# Patient Record
Sex: FEMALE | Race: WHITE | NOT HISPANIC OR LATINO | Employment: OTHER | URBAN - METROPOLITAN AREA
[De-identification: names, ages, dates, MRNs, and addresses within clinical notes are randomized per-mention and may not be internally consistent; named-entity substitution may affect disease eponyms.]

---

## 2021-10-27 ENCOUNTER — APPOINTMENT (OUTPATIENT)
Dept: CT IMAGING | Facility: HOSPITAL | Age: 66
End: 2021-10-27

## 2021-10-27 ENCOUNTER — APPOINTMENT (OUTPATIENT)
Dept: GENERAL RADIOLOGY | Facility: HOSPITAL | Age: 66
End: 2021-10-27

## 2021-10-27 ENCOUNTER — HOSPITAL ENCOUNTER (EMERGENCY)
Facility: HOSPITAL | Age: 66
Discharge: HOME | End: 2021-10-27
Attending: EMERGENCY MEDICINE | Admitting: EMERGENCY MEDICINE

## 2021-10-27 VITALS
HEART RATE: 72 BPM | BODY MASS INDEX: 29.02 KG/M2 | WEIGHT: 170 LBS | SYSTOLIC BLOOD PRESSURE: 135 MMHG | DIASTOLIC BLOOD PRESSURE: 71 MMHG | RESPIRATION RATE: 16 BRPM | OXYGEN SATURATION: 88 % | HEIGHT: 64 IN | TEMPERATURE: 97.7 F

## 2021-10-27 DIAGNOSIS — J44.1 COPD EXACERBATION (HCC): Primary | ICD-10-CM

## 2021-10-27 LAB
ALBUMIN SERPL-MCNC: 4.3 G/DL (ref 3.5–5.2)
ALBUMIN/GLOB SERPL: 1.5 G/DL
ALP SERPL-CCNC: 100 U/L (ref 39–117)
ALT SERPL W P-5'-P-CCNC: 13 U/L (ref 1–33)
ANION GAP SERPL CALCULATED.3IONS-SCNC: 7 MMOL/L (ref 5–15)
AST SERPL-CCNC: 17 U/L (ref 1–32)
BASOPHILS # BLD AUTO: 0.05 10*3/MM3 (ref 0–0.2)
BASOPHILS NFR BLD AUTO: 0.7 % (ref 0–1.5)
BILIRUB SERPL-MCNC: 0.5 MG/DL (ref 0–1.2)
BUN SERPL-MCNC: 4 MG/DL (ref 8–23)
BUN/CREAT SERPL: 5.1 (ref 7–25)
CALCIUM SPEC-SCNC: 9.4 MG/DL (ref 8.6–10.5)
CHLORIDE SERPL-SCNC: 93 MMOL/L (ref 98–107)
CO2 SERPL-SCNC: 35 MMOL/L (ref 22–29)
CREAT SERPL-MCNC: 0.79 MG/DL (ref 0.57–1)
DEPRECATED RDW RBC AUTO: 53.2 FL (ref 37–54)
EOSINOPHIL # BLD AUTO: 0.07 10*3/MM3 (ref 0–0.4)
EOSINOPHIL NFR BLD AUTO: 0.9 % (ref 0.3–6.2)
ERYTHROCYTE [DISTWIDTH] IN BLOOD BY AUTOMATED COUNT: 14.2 % (ref 12.3–15.4)
GFR SERPL CREATININE-BSD FRML MDRD: 73 ML/MIN/1.73
GLOBULIN UR ELPH-MCNC: 2.8 GM/DL
GLUCOSE SERPL-MCNC: 94 MG/DL (ref 65–99)
HCT VFR BLD AUTO: 46.4 % (ref 34–46.6)
HGB BLD-MCNC: 15 G/DL (ref 12–15.9)
HOLD SPECIMEN: NORMAL
IMM GRANULOCYTES # BLD AUTO: 0.02 10*3/MM3 (ref 0–0.05)
IMM GRANULOCYTES NFR BLD AUTO: 0.3 % (ref 0–0.5)
LYMPHOCYTES # BLD AUTO: 2.42 10*3/MM3 (ref 0.7–3.1)
LYMPHOCYTES NFR BLD AUTO: 32.7 % (ref 19.6–45.3)
MCH RBC QN AUTO: 33.1 PG (ref 26.6–33)
MCHC RBC AUTO-ENTMCNC: 32.3 G/DL (ref 31.5–35.7)
MCV RBC AUTO: 102.4 FL (ref 79–97)
MONOCYTES # BLD AUTO: 0.5 10*3/MM3 (ref 0.1–0.9)
MONOCYTES NFR BLD AUTO: 6.8 % (ref 5–12)
NEUTROPHILS NFR BLD AUTO: 4.34 10*3/MM3 (ref 1.7–7)
NEUTROPHILS NFR BLD AUTO: 58.6 % (ref 42.7–76)
NRBC BLD AUTO-RTO: 0 /100 WBC (ref 0–0.2)
NT-PROBNP SERPL-MCNC: 102.4 PG/ML (ref 0–900)
PLATELET # BLD AUTO: 206 10*3/MM3 (ref 140–450)
PMV BLD AUTO: 10 FL (ref 6–12)
POTASSIUM SERPL-SCNC: 4.4 MMOL/L (ref 3.5–5.2)
PROT SERPL-MCNC: 7.1 G/DL (ref 6–8.5)
RBC # BLD AUTO: 4.53 10*6/MM3 (ref 3.77–5.28)
SODIUM SERPL-SCNC: 135 MMOL/L (ref 136–145)
TROPONIN T SERPL-MCNC: <0.01 NG/ML (ref 0–0.03)
WBC # BLD AUTO: 7.4 10*3/MM3 (ref 3.4–10.8)
WHOLE BLOOD HOLD SPECIMEN: NORMAL
WHOLE BLOOD HOLD SPECIMEN: NORMAL

## 2021-10-27 PROCEDURE — 99284 EMERGENCY DEPT VISIT MOD MDM: CPT

## 2021-10-27 PROCEDURE — 83880 ASSAY OF NATRIURETIC PEPTIDE: CPT | Performed by: EMERGENCY MEDICINE

## 2021-10-27 PROCEDURE — 71045 X-RAY EXAM CHEST 1 VIEW: CPT

## 2021-10-27 PROCEDURE — 93005 ELECTROCARDIOGRAM TRACING: CPT

## 2021-10-27 PROCEDURE — 80053 COMPREHEN METABOLIC PANEL: CPT | Performed by: EMERGENCY MEDICINE

## 2021-10-27 PROCEDURE — 71275 CT ANGIOGRAPHY CHEST: CPT

## 2021-10-27 PROCEDURE — 94799 UNLISTED PULMONARY SVC/PX: CPT

## 2021-10-27 PROCEDURE — 94640 AIRWAY INHALATION TREATMENT: CPT

## 2021-10-27 PROCEDURE — 0 IOPAMIDOL PER 1 ML: Performed by: EMERGENCY MEDICINE

## 2021-10-27 PROCEDURE — 84484 ASSAY OF TROPONIN QUANT: CPT | Performed by: EMERGENCY MEDICINE

## 2021-10-27 PROCEDURE — 85025 COMPLETE CBC W/AUTO DIFF WBC: CPT | Performed by: EMERGENCY MEDICINE

## 2021-10-27 PROCEDURE — 93005 ELECTROCARDIOGRAM TRACING: CPT | Performed by: EMERGENCY MEDICINE

## 2021-10-27 RX ORDER — TRAMADOL HYDROCHLORIDE 50 MG/1
50 TABLET ORAL EVERY 6 HOURS PRN
COMMUNITY

## 2021-10-27 RX ORDER — PREDNISONE 10 MG/1
TABLET ORAL
Qty: 21 TABLET | Refills: 0 | Status: SHIPPED | OUTPATIENT
Start: 2021-10-27

## 2021-10-27 RX ORDER — GABAPENTIN 600 MG/1
600 TABLET ORAL 3 TIMES DAILY
COMMUNITY

## 2021-10-27 RX ORDER — SODIUM CHLORIDE 0.9 % (FLUSH) 0.9 %
10 SYRINGE (ML) INJECTION AS NEEDED
Status: DISCONTINUED | OUTPATIENT
Start: 2021-10-27 | End: 2021-10-27 | Stop reason: HOSPADM

## 2021-10-27 RX ORDER — METHYLPREDNISOLONE SODIUM SUCCINATE 125 MG/2ML
125 INJECTION, POWDER, LYOPHILIZED, FOR SOLUTION INTRAMUSCULAR; INTRAVENOUS ONCE
Status: DISCONTINUED | OUTPATIENT
Start: 2021-10-27 | End: 2021-10-27 | Stop reason: HOSPADM

## 2021-10-27 RX ORDER — ALBUTEROL SULFATE 90 UG/1
2 AEROSOL, METERED RESPIRATORY (INHALATION) ONCE
Status: COMPLETED | OUTPATIENT
Start: 2021-10-27 | End: 2021-10-27

## 2021-10-27 RX ADMIN — IOPAMIDOL 80 ML: 755 INJECTION, SOLUTION INTRAVENOUS at 12:20

## 2021-10-27 RX ADMIN — ALBUTEROL SULFATE 2 PUFF: 90 AEROSOL, METERED RESPIRATORY (INHALATION) at 13:48

## 2021-10-28 LAB
QT INTERVAL: 428 MS
QTC INTERVAL: 441 MS

## 2024-08-30 ENCOUNTER — APPOINTMENT (OUTPATIENT)
Dept: GENERAL RADIOLOGY | Facility: HOSPITAL | Age: 69
End: 2024-08-30
Payer: MEDICARE

## 2024-08-30 ENCOUNTER — HOSPITAL ENCOUNTER (INPATIENT)
Facility: HOSPITAL | Age: 69
LOS: 3 days | Discharge: HOME | End: 2024-09-03
Attending: EMERGENCY MEDICINE | Admitting: STUDENT IN AN ORGANIZED HEALTH CARE EDUCATION/TRAINING PROGRAM
Payer: MEDICARE

## 2024-08-30 ENCOUNTER — APPOINTMENT (OUTPATIENT)
Dept: CT IMAGING | Facility: HOSPITAL | Age: 69
End: 2024-08-30
Payer: MEDICARE

## 2024-08-30 DIAGNOSIS — J96.01 ACUTE RESPIRATORY FAILURE WITH HYPOXIA: ICD-10-CM

## 2024-08-30 DIAGNOSIS — J44.1 COPD EXACERBATION: Primary | ICD-10-CM

## 2024-08-30 PROBLEM — J96.11 CHRONIC HYPOXEMIC RESPIRATORY FAILURE: Status: ACTIVE | Noted: 2022-02-21

## 2024-08-30 PROBLEM — I27.81 COR PULMONALE: Status: ACTIVE | Noted: 2022-02-21

## 2024-08-30 LAB
ALBUMIN SERPL-MCNC: 3.9 G/DL (ref 3.5–5.2)
ALBUMIN/GLOB SERPL: 1.7 G/DL
ALP SERPL-CCNC: 83 U/L (ref 39–117)
ALT SERPL W P-5'-P-CCNC: 14 U/L (ref 1–33)
ANION GAP SERPL CALCULATED.3IONS-SCNC: 6 MMOL/L (ref 5–15)
AST SERPL-CCNC: 16 U/L (ref 1–32)
B PARAPERT DNA SPEC QL NAA+PROBE: NOT DETECTED
B PERT DNA SPEC QL NAA+PROBE: NOT DETECTED
BASOPHILS # BLD AUTO: 0.06 10*3/MM3 (ref 0–0.2)
BASOPHILS NFR BLD AUTO: 0.7 % (ref 0–1.5)
BILIRUB SERPL-MCNC: 0.4 MG/DL (ref 0–1.2)
BUN SERPL-MCNC: 11 MG/DL (ref 8–23)
BUN/CREAT SERPL: 9.8 (ref 7–25)
C PNEUM DNA NPH QL NAA+NON-PROBE: NOT DETECTED
CALCIUM SPEC-SCNC: 8.9 MG/DL (ref 8.6–10.5)
CHLORIDE SERPL-SCNC: 93 MMOL/L (ref 98–107)
CO2 SERPL-SCNC: 36 MMOL/L (ref 22–29)
CREAT SERPL-MCNC: 1.12 MG/DL (ref 0.57–1)
DEPRECATED RDW RBC AUTO: 57.1 FL (ref 37–54)
EGFRCR SERPLBLD CKD-EPI 2021: 53.3 ML/MIN/1.73
EOSINOPHIL # BLD AUTO: 0.06 10*3/MM3 (ref 0–0.4)
EOSINOPHIL NFR BLD AUTO: 0.7 % (ref 0.3–6.2)
ERYTHROCYTE [DISTWIDTH] IN BLOOD BY AUTOMATED COUNT: 14.7 % (ref 12.3–15.4)
FLUAV SUBTYP SPEC NAA+PROBE: NOT DETECTED
FLUBV RNA ISLT QL NAA+PROBE: NOT DETECTED
GLOBULIN UR ELPH-MCNC: 2.3 GM/DL
GLUCOSE SERPL-MCNC: 109 MG/DL (ref 65–99)
HADV DNA SPEC NAA+PROBE: NOT DETECTED
HCOV 229E RNA SPEC QL NAA+PROBE: NOT DETECTED
HCOV HKU1 RNA SPEC QL NAA+PROBE: NOT DETECTED
HCOV NL63 RNA SPEC QL NAA+PROBE: NOT DETECTED
HCOV OC43 RNA SPEC QL NAA+PROBE: NOT DETECTED
HCT VFR BLD AUTO: 49.6 % (ref 34–46.6)
HGB BLD-MCNC: 14.9 G/DL (ref 12–15.9)
HMPV RNA NPH QL NAA+NON-PROBE: NOT DETECTED
HOLD SPECIMEN: NORMAL
HPIV1 RNA ISLT QL NAA+PROBE: NOT DETECTED
HPIV2 RNA SPEC QL NAA+PROBE: NOT DETECTED
HPIV3 RNA NPH QL NAA+PROBE: NOT DETECTED
HPIV4 P GENE NPH QL NAA+PROBE: NOT DETECTED
IMM GRANULOCYTES # BLD AUTO: 0.02 10*3/MM3 (ref 0–0.05)
IMM GRANULOCYTES NFR BLD AUTO: 0.2 % (ref 0–0.5)
LYMPHOCYTES # BLD AUTO: 1.4 10*3/MM3 (ref 0.7–3.1)
LYMPHOCYTES NFR BLD AUTO: 16.8 % (ref 19.6–45.3)
M PNEUMO IGG SER IA-ACNC: NOT DETECTED
MCH RBC QN AUTO: 31.2 PG (ref 26.6–33)
MCHC RBC AUTO-ENTMCNC: 30 G/DL (ref 31.5–35.7)
MCV RBC AUTO: 104 FL (ref 79–97)
MONOCYTES # BLD AUTO: 0.59 10*3/MM3 (ref 0.1–0.9)
MONOCYTES NFR BLD AUTO: 7.1 % (ref 5–12)
NEUTROPHILS NFR BLD AUTO: 6.21 10*3/MM3 (ref 1.7–7)
NEUTROPHILS NFR BLD AUTO: 74.5 % (ref 42.7–76)
NRBC BLD AUTO-RTO: 0 /100 WBC (ref 0–0.2)
NT-PROBNP SERPL-MCNC: 6982 PG/ML (ref 0–900)
PLATELET # BLD AUTO: 181 10*3/MM3 (ref 140–450)
PMV BLD AUTO: 10.8 FL (ref 6–12)
POTASSIUM SERPL-SCNC: 4.5 MMOL/L (ref 3.5–5.2)
PROT SERPL-MCNC: 6.2 G/DL (ref 6–8.5)
RBC # BLD AUTO: 4.77 10*6/MM3 (ref 3.77–5.28)
RHINOVIRUS RNA SPEC NAA+PROBE: NOT DETECTED
RSV RNA NPH QL NAA+NON-PROBE: NOT DETECTED
SARS-COV-2 RNA NPH QL NAA+NON-PROBE: NOT DETECTED
SODIUM SERPL-SCNC: 135 MMOL/L (ref 136–145)
TROPONIN T SERPL HS-MCNC: 21 NG/L
WBC NRBC COR # BLD AUTO: 8.34 10*3/MM3 (ref 3.4–10.8)
WHOLE BLOOD HOLD COAG: NORMAL
WHOLE BLOOD HOLD SPECIMEN: NORMAL

## 2024-08-30 PROCEDURE — 94799 UNLISTED PULMONARY SVC/PX: CPT

## 2024-08-30 PROCEDURE — 71045 X-RAY EXAM CHEST 1 VIEW: CPT

## 2024-08-30 PROCEDURE — 25510000001 IOPAMIDOL PER 1 ML: Performed by: EMERGENCY MEDICINE

## 2024-08-30 PROCEDURE — G0378 HOSPITAL OBSERVATION PER HR: HCPCS

## 2024-08-30 PROCEDURE — 99285 EMERGENCY DEPT VISIT HI MDM: CPT

## 2024-08-30 PROCEDURE — 83880 ASSAY OF NATRIURETIC PEPTIDE: CPT | Performed by: EMERGENCY MEDICINE

## 2024-08-30 PROCEDURE — 25010000002 METHYLPREDNISOLONE PER 125 MG: Performed by: INTERNAL MEDICINE

## 2024-08-30 PROCEDURE — 80053 COMPREHEN METABOLIC PANEL: CPT | Performed by: EMERGENCY MEDICINE

## 2024-08-30 PROCEDURE — 84484 ASSAY OF TROPONIN QUANT: CPT | Performed by: EMERGENCY MEDICINE

## 2024-08-30 PROCEDURE — 25010000002 METHYLPREDNISOLONE PER 125 MG: Performed by: EMERGENCY MEDICINE

## 2024-08-30 PROCEDURE — 0202U NFCT DS 22 TRGT SARS-COV-2: CPT | Performed by: EMERGENCY MEDICINE

## 2024-08-30 PROCEDURE — 94640 AIRWAY INHALATION TREATMENT: CPT

## 2024-08-30 PROCEDURE — 99222 1ST HOSP IP/OBS MODERATE 55: CPT | Performed by: INTERNAL MEDICINE

## 2024-08-30 PROCEDURE — 93005 ELECTROCARDIOGRAM TRACING: CPT | Performed by: EMERGENCY MEDICINE

## 2024-08-30 PROCEDURE — 71275 CT ANGIOGRAPHY CHEST: CPT

## 2024-08-30 PROCEDURE — 85025 COMPLETE CBC W/AUTO DIFF WBC: CPT | Performed by: EMERGENCY MEDICINE

## 2024-08-30 RX ORDER — DOXYCYCLINE 100 MG/1
100 CAPSULE ORAL EVERY 12 HOURS SCHEDULED
Status: DISCONTINUED | OUTPATIENT
Start: 2024-08-30 | End: 2024-09-03 | Stop reason: HOSPADM

## 2024-08-30 RX ORDER — METHYLPREDNISOLONE SODIUM SUCCINATE 125 MG/2ML
60 INJECTION, POWDER, LYOPHILIZED, FOR SOLUTION INTRAMUSCULAR; INTRAVENOUS EVERY 12 HOURS
Status: DISCONTINUED | OUTPATIENT
Start: 2024-08-30 | End: 2024-09-01

## 2024-08-30 RX ORDER — ALPRAZOLAM 0.25 MG
0.25 TABLET ORAL 2 TIMES DAILY PRN
Status: DISCONTINUED | OUTPATIENT
Start: 2024-08-30 | End: 2024-09-03 | Stop reason: HOSPADM

## 2024-08-30 RX ORDER — GABAPENTIN 400 MG/1
800 CAPSULE ORAL EVERY 8 HOURS SCHEDULED
Status: DISCONTINUED | OUTPATIENT
Start: 2024-08-30 | End: 2024-09-03 | Stop reason: HOSPADM

## 2024-08-30 RX ORDER — HYDROCHLOROTHIAZIDE 12.5 MG/1
12.5 TABLET ORAL DAILY
Status: ON HOLD | COMMUNITY
End: 2024-08-31

## 2024-08-30 RX ORDER — METHYLPREDNISOLONE SODIUM SUCCINATE 125 MG/2ML
125 INJECTION, POWDER, LYOPHILIZED, FOR SOLUTION INTRAMUSCULAR; INTRAVENOUS ONCE
Status: COMPLETED | OUTPATIENT
Start: 2024-08-30 | End: 2024-08-30

## 2024-08-30 RX ORDER — SODIUM CHLORIDE 0.9 % (FLUSH) 0.9 %
10 SYRINGE (ML) INJECTION AS NEEDED
Status: DISCONTINUED | OUTPATIENT
Start: 2024-08-30 | End: 2024-09-03 | Stop reason: HOSPADM

## 2024-08-30 RX ORDER — FAMOTIDINE 20 MG/1
20 TABLET, FILM COATED ORAL
Status: DISCONTINUED | OUTPATIENT
Start: 2024-08-30 | End: 2024-09-03 | Stop reason: HOSPADM

## 2024-08-30 RX ORDER — IPRATROPIUM BROMIDE AND ALBUTEROL SULFATE 2.5; .5 MG/3ML; MG/3ML
3 SOLUTION RESPIRATORY (INHALATION)
Status: DISCONTINUED | OUTPATIENT
Start: 2024-08-30 | End: 2024-09-02

## 2024-08-30 RX ORDER — IOPAMIDOL 755 MG/ML
100 INJECTION, SOLUTION INTRAVASCULAR
Status: COMPLETED | OUTPATIENT
Start: 2024-08-30 | End: 2024-08-30

## 2024-08-30 RX ORDER — TRAZODONE HYDROCHLORIDE 100 MG/1
100 TABLET ORAL NIGHTLY
COMMUNITY

## 2024-08-30 RX ORDER — ALBUTEROL SULFATE 90 UG/1
2 AEROSOL, METERED RESPIRATORY (INHALATION)
COMMUNITY

## 2024-08-30 RX ORDER — BISOPROLOL FUMARATE AND HYDROCHLOROTHIAZIDE 10; 6.25 MG/1; MG/1
1 TABLET ORAL DAILY
COMMUNITY

## 2024-08-30 RX ORDER — TRAZODONE HYDROCHLORIDE 100 MG/1
100 TABLET ORAL NIGHTLY
Status: DISCONTINUED | OUTPATIENT
Start: 2024-08-30 | End: 2024-09-03 | Stop reason: HOSPADM

## 2024-08-30 RX ORDER — BISOPROLOL FUMARATE 10 MG/1
10 TABLET, FILM COATED ORAL
Status: DISCONTINUED | OUTPATIENT
Start: 2024-08-31 | End: 2024-08-31

## 2024-08-30 RX ORDER — NICOTINE 21 MG/24HR
1 PATCH, TRANSDERMAL 24 HOURS TRANSDERMAL
Status: DISCONTINUED | OUTPATIENT
Start: 2024-08-30 | End: 2024-09-03 | Stop reason: HOSPADM

## 2024-08-30 RX ORDER — UMECLIDINIUM BROMIDE AND VILANTEROL TRIFENATATE 62.5; 25 UG/1; UG/1
1 POWDER RESPIRATORY (INHALATION)
COMMUNITY

## 2024-08-30 RX ORDER — ALBUTEROL SULFATE 0.83 MG/ML
2.5 SOLUTION RESPIRATORY (INHALATION) ONCE
Status: COMPLETED | OUTPATIENT
Start: 2024-08-30 | End: 2024-08-30

## 2024-08-30 RX ORDER — ALBUTEROL SULFATE 0.83 MG/ML
2.5 SOLUTION RESPIRATORY (INHALATION) EVERY 6 HOURS PRN
Status: DISCONTINUED | OUTPATIENT
Start: 2024-08-30 | End: 2024-09-02

## 2024-08-30 RX ADMIN — IPRATROPIUM BROMIDE AND ALBUTEROL SULFATE 3 ML: 2.5; .5 SOLUTION RESPIRATORY (INHALATION) at 19:38

## 2024-08-30 RX ADMIN — ALBUTEROL SULFATE 2.5 MG: 2.5 SOLUTION RESPIRATORY (INHALATION) at 10:49

## 2024-08-30 RX ADMIN — TRAZODONE HYDROCHLORIDE 100 MG: 100 TABLET ORAL at 22:49

## 2024-08-30 RX ADMIN — METHYLPREDNISOLONE SODIUM SUCCINATE 60 MG: 125 INJECTION, POWDER, FOR SOLUTION INTRAMUSCULAR; INTRAVENOUS at 22:49

## 2024-08-30 RX ADMIN — IPRATROPIUM BROMIDE AND ALBUTEROL SULFATE 3 ML: 2.5; .5 SOLUTION RESPIRATORY (INHALATION) at 15:13

## 2024-08-30 RX ADMIN — IOPAMIDOL 80 ML: 755 INJECTION, SOLUTION INTRAVENOUS at 11:36

## 2024-08-30 RX ADMIN — NICOTINE 1 PATCH: 21 PATCH TRANSDERMAL at 14:24

## 2024-08-30 RX ADMIN — DOXYCYCLINE 100 MG: 100 CAPSULE ORAL at 14:25

## 2024-08-30 RX ADMIN — METHYLPREDNISOLONE SODIUM SUCCINATE 125 MG: 125 INJECTION, POWDER, FOR SOLUTION INTRAMUSCULAR; INTRAVENOUS at 10:55

## 2024-08-30 RX ADMIN — FAMOTIDINE 20 MG: 20 TABLET, FILM COATED ORAL at 17:03

## 2024-08-30 RX ADMIN — DOXYCYCLINE 100 MG: 100 CAPSULE ORAL at 22:48

## 2024-08-30 RX ADMIN — GABAPENTIN 800 MG: 400 CAPSULE ORAL at 22:48

## 2024-08-30 NOTE — ED NOTES
Elvi Keita    Nursing Report ED to Floor:  Mental status: alert and oriented  Ambulatory status: ambulates with assistance, however on oxygen and SOA  Oxygen Therapy:  4LNC  Cardiac Rhythm: NSR  Admitted from: ED rm 14  Safety Concerns:  SOA with exertion, hx of copd  Social Issues: applied nicotine patch prior to admission  ED Room #:  14    ED Nurse Phone Extension - [phone] or may call [phone].      HPI:   Chief Complaint   Patient presents with    Shortness of Breath       Past Medical History:  Past Medical History:   Diagnosis Date    COPD (chronic obstructive pulmonary disease)     Hypertension         Past Surgical History:  Past Surgical History:   Procedure Laterality Date    LAPAROSCOPIC TUBAL LIGATION      TOE SURGERY          Admitting Doctor:   Reyna Sanchez MD    Consulting Provider(s):  Consults       No orders found from 8/1/2024 to 8/31/2024.             Admitting Diagnosis:   The primary encounter diagnosis was COPD exacerbation. A diagnosis of Acute respiratory failure with hypoxia was also pertinent to this visit.    Most Recent Vitals:   Vitals:    08/30/24 1049 08/30/24 1330 08/30/24 1412 08/30/24 1427   BP:  151/63     Pulse: 62 69 77    Resp: 22   20   Temp:    98 °F (36.7 °C)   TempSrc:    Oral   SpO2: 93% 90% 91%    Weight:       Height:           Active LDAs/IV Access:   Lines, Drains & Airways       Active LDAs       Name Placement date Placement time Site Days    Peripheral IV 08/30/24 0943 Right Antecubital 08/30/24  0943  Antecubital  less than 1                    Labs (abnormal labs have a star):   Labs Reviewed   COMPREHENSIVE METABOLIC PANEL - Abnormal; Notable for the following components:       Result Value    Glucose 109 (*)     Creatinine 1.12 (*)     Sodium 135 (*)     Chloride 93 (*)     CO2 36.0 (*)     eGFR 53.3 (*)     All other components within normal limits    Narrative:     GFR Normal >60  Chronic Kidney Disease <60  Kidney Failure <15     BNP (IN-HOUSE) - Abnormal;  Notable for the following components:    proBNP 6,982.0 (*)     All other components within normal limits    Narrative:     This assay is used as an aid in the diagnosis of individuals suspected of having heart failure. It can be used as an aid in the diagnosis of acute decompensated heart failure (ADHF) in patients presenting with signs and symptoms of ADHF to the emergency department (ED). In addition, NT-proBNP of <300 pg/mL indicates ADHF is not likely.    Age Range Result Interpretation  NT-proBNP Concentration (pg/mL:      <50             Positive            >450                   Gray                 300-450                    Negative             <300    50-75           Positive            >900                  Gray                300-900                  Negative            <300      >75             Positive            >1800                  Gray                300-1800                  Negative            <300   SINGLE HS TROPONIN T - Abnormal; Notable for the following components:    HS Troponin T 21 (*)     All other components within normal limits    Narrative:     High Sensitive Troponin T Reference Range:  <14.0 ng/L- Negative Female for AMI  <22.0 ng/L- Negative Male for AMI  >=14 - Abnormal Female indicating possible myocardial injury.  >=22 - Abnormal Male indicating possible myocardial injury.   Clinicians would have to utilize clinical acumen, EKG, Troponin, and serial changes to determine if it is an Acute Myocardial Infarction or myocardial injury due to an underlying chronic condition.        CBC WITH AUTO DIFFERENTIAL - Abnormal; Notable for the following components:    Hematocrit 49.6 (*)     .0 (*)     MCHC 30.0 (*)     RDW-SD 57.1 (*)     Lymphocyte % 16.8 (*)     All other components within normal limits   RESPIRATORY PANEL PCR W/ COVID-19 (SARS-COV-2), NP SWAB IN UTM/VTP, 2 HR TAT - Normal    Narrative:     In the setting of a positive respiratory panel with a viral infection PLUS  a negative procalcitonin without other underlying concern for bacterial infection, consider observing off antibiotics or discontinuation of antibiotics and continue supportive care. If the respiratory panel is positive for atypical bacterial infection (Bordetella pertussis, Chlamydophila pneumoniae, or Mycoplasma pneumoniae), consider antibiotic de-escalation to target atypical bacterial infection.   RAINBOW DRAW    Narrative:     The following orders were created for panel order [place] Draw.  Procedure                               Abnormality         Status                     ---------                               -----------         ------                     Green Top (Gel)[[id number]]                                  Final result               Lavender Top[[id number]]                                     Final result               Gold Top - SST[[id number]]                                   Final result               Torres Top[[id number]]                                         Final result               Light Blue Top[[id number]]                                   Final result                 Please view results for these tests on the individual orders.   CBC AND DIFFERENTIAL    Narrative:     The following orders were created for panel order CBC & Differential.  Procedure                               Abnormality         Status                     ---------                               -----------         ------                     CBC Auto Differential[[id number]]        Abnormal            Final result                 Please view results for these tests on the individual orders.   GREEN TOP   LAVENDER TOP   GOLD TOP - SST   GRAY TOP   LIGHT BLUE TOP       Meds Given in ED:   Medications   sodium chloride 0.9 % flush 10 mL (has no administration in time range)   nicotine (NICODERM CQ) 21 MG/24HR patch 1 patch (1 patch Transdermal Medication Applied 8/30/24 0463)   ipratropium-albuterol (DUO-NEB) nebulizer  solution 3 mL (has no administration in time range)   albuterol (PROVENTIL) nebulizer solution 0.083% 2.5 mg/3mL (has no administration in time range)   doxycycline (MONODOX) capsule 100 mg (100 mg Oral Given 8/30/24 1425)   ALPRAZolam (XANAX) tablet 0.25 mg (has no administration in time range)   methylPREDNISolone sodium succinate (SOLU-Medrol) injection 125 mg (125 mg Intravenous Given 8/30/24 1055)   albuterol (PROVENTIL) nebulizer solution 0.083% 2.5 mg/3mL (2.5 mg Nebulization Given 8/30/24 1049)   iopamidol (ISOVUE-370) 76 % injection 100 mL (80 mL Intravenous Given 8/30/24 1136)           Last NIH score:                                                          Dysphagia screening results:        [place] Coma Scale:  No data recorded     CIWA:        Restraint Type:            Isolation Status:  No active isolations

## 2024-08-30 NOTE — CASE MANAGEMENT/SOCIAL WORK
Discharge Planning Assessment  [facility]     Patient Name: Elvi Corbett  MRN: [id number]  Today's Date: 8/30/2024    Admit Date: 8/30/2024    Plan: Initial   Discharge Needs Assessment       Row Name 08/30/24 1132       Living Environment    People in Home spouse    Name(s) of People in Home JESENIA CORBETT Spouse [phone]    Current Living Arrangements home    Potentially Unsafe Housing Conditions none    In the past 12 months has the electric, gas, oil, or water company threatened to shut off services in your home? No    Primary Care Provided by self    Provides Primary Care For no one    Family Caregiver if Needed child(fani), adult    Family Caregiver Names AARON WYLIE Daughter   [phone]    Quality of Family Relationships helpful    Able to Return to Prior Arrangements yes       Resource/Environmental Concerns    Resource/Environmental Concerns none    Transportation Concerns none       Transportation Needs    In the past 12 months, has lack of transportation kept you from medical appointments or from getting medications? no    In the past 12 months, has lack of transportation kept you from meetings, work, or from getting things needed for daily living? No       Food Insecurity    Within the past 12 months, you worried that your food would run out before you got the money to buy more. Never true    Within the past 12 months, the food you bought just didn't last and you didn't have money to get more. Never true       Transition Planning    Patient/Family Anticipates Transition to home with family    Patient/Family Anticipated Services at Transition     Transportation Anticipated family or friend will provide       Discharge Needs Assessment    Readmission Within the Last 30 Days no previous admission in last 30 days    Equipment Currently Used at Home oxygen;nebulizer    Concerns to be Addressed denies needs/concerns at this time    Anticipated Changes Related to Illness none    Equipment  Needed After Discharge none                   Discharge Plan       Row Name 08/30/24 1133       Plan    Plan Initial    Plan Comments CM spoke with patient at bedside regarding DC planning. Patient resides in [facility] with her spouse. Patient is independent with ADL's, uses no DME - on home O2 qhs provided by [employer]. Has a nebulizer machine. Patient denies any current home health or outpatient services. Patient has medical insurance, prescription coverage and is able to afford/obtain medications without difficulty. Patient has no advanced directives. Patient denies any discharge planning needs. Goal is home. CM will continue to follow    Final Discharge Disposition Code 30 - still a patient                  Continued Care and Services - Admitted Since 8/30/2024    No active coordination exists for this encounter.          Demographic Summary       Row Name 08/30/24 1130       General Information    Arrived From home    Referral Source emergency department    Reason for Consult discharge planning    Preferred Language English       Contact Information    Contact Information Comments JESENIA CORBETT Spouse [phone]                   Functional Status       Row Name 08/30/24 1131       Functional Status    Usual Activity Tolerance good    Current Activity Tolerance good       Physical Activity    On average, how many days per week do you engage in moderate to strenuous exercise (like a brisk walk)? 0 days    On average, how many minutes do you engage in exercise at this level? 0 min    Number of minutes of exercise per week 0       Assessment of Health Literacy    How often do you have someone help you read hospital materials? Never    How often do you have problems learning about your medical condition because of difficulty understanding written information? Never    How often do you have a problem understanding what is told to you about your medical condition? Never    How confident are you filling out  medical forms by yourself? Quite a bit    Health Literacy Moderate       Functional Status, IADL    Medications independent    Meal Preparation independent    Housekeeping independent    Laundry independent    Shopping independent       Mental Status    General Appearance WDL WDL       Mental Status Summary    Recent Changes in Mental Status/Cognitive Functioning no changes       Employment/    Employment Status retired                   Psychosocial    No documentation.                  Abuse/Neglect    No documentation.                  Legal    No documentation.                  Substance Abuse    No documentation.                  Patient Forms    No documentation.                     Zenaida Alcaraz RN

## 2024-08-30 NOTE — PLAN OF CARE
Problem: Adult Inpatient Plan of Care  Goal: Plan of Care Review  Outcome: Ongoing, Progressing  Goal: Patient-Specific Goal (Individualized)  Outcome: Ongoing, Progressing  Goal: Absence of Hospital-Acquired Illness or Injury  Outcome: Ongoing, Progressing  Goal: Optimal Comfort and Wellbeing  Outcome: Ongoing, Progressing  Goal: Readiness for Transition of Care  Outcome: Ongoing, Progressing  Intervention: Mutually Develop Transition Plan  Recent Flowsheet Documentation  Taken 8/30/2024 1520 by Zakia Obando, RN  Equipment Currently Used at Home:   bath bench   scales   oxygen   walker, rolling     Problem: COPD (Chronic Obstructive Pulmonary Disease) Comorbidity  Goal: Maintenance of COPD Symptom Control  Outcome: Ongoing, Progressing   Goal Outcome Evaluation:

## 2024-08-30 NOTE — ED PROVIDER NOTES
Subjective   History of Present Illness  Mrs. Kim presents with 1 week of shortness of breath.  She reports sore throat and cough.  She denies fevers or chills.  Daughter tells me that she has been sleeping mostly over the last couple of days.  She has had at least 1 fall.  She has history of COPD.  She is prescribed oxygen to use only at night.  She tells me she does not feel like her machine is putting out much oxygen.  She is has a nebulizer but denies chronic steroid use.  Saturations were 71% on room air in triage.      Review of Systems    Past Medical History:   Diagnosis Date    COPD (chronic obstructive pulmonary disease)     Hypertension        Allergies   Allergen Reactions    Fish Oil Rash       No past surgical history on file.    No family history on file.    Social History     Socioeconomic History    Marital status:            Objective   Physical Exam  Vitals (Saturations 93% on 4 L oxygen while she is resting in bed) and nursing note reviewed.   Constitutional:       General: She is not in acute distress.     Appearance: Normal appearance.   HENT:      Head: Normocephalic and atraumatic.      Nose: Nose normal. No congestion or rhinorrhea.   Eyes:      General: No scleral icterus.     Conjunctiva/sclera: Conjunctivae normal.   Neck:      Comments: No JVD   Cardiovascular:      Rate and Rhythm: Normal rate and regular rhythm.      Heart sounds: No murmur heard.     No friction rub.   Pulmonary:      Effort: Pulmonary effort is normal.      Breath sounds: Decreased breath sounds and wheezing present. No rales.   Abdominal:      General: Bowel sounds are normal.      Palpations: Abdomen is soft.      Tenderness: There is no abdominal tenderness. There is no guarding or rebound.   Musculoskeletal:         General: No tenderness.      Cervical back: Normal range of motion and neck supple.      Right lower leg: No edema.      Left lower leg: No edema.   Skin:     General: Skin is warm and dry.       Coloration: Skin is not pale.      Findings: No erythema.   Neurological:      General: No focal deficit present.      Mental Status: She is alert and oriented to person, place, and time.      Motor: No weakness.      Coordination: Coordination normal.   Psychiatric:         Mood and Affect: Mood normal.         Behavior: Behavior normal.         Thought Content: Thought content normal.         Procedures           ED Course  ED Course as of 08/30/24 1309   Fri Aug 30, 2024   1059 Chest x-ray does not show any gross problem.  Radiology has read as normal.  Will obtain CT scan of her chest.  BNP elevated.  She tells me she does take a fluid pill and has had echocardiogram several years ago but cannot remember where it was done.  There is no record of that in our electronic medical record.  Respiratory panel is pending. [DT]   1251 Respiratory panel negative.  CT scan negative for PE or pneumonia.  Shows evidence of COPD. [DT]      ED Course User Index  [DT] Darinel Wolfe MD                                             Medical Decision Making  Please see course notes.  I ordered and interpreted labs as well as chest x-ray, EKG, CT scanning of her chest.  Had multiple reevaluations.  Ordered multiple medications    Problems Addressed:  Acute respiratory failure with hypoxia: complicated acute illness or injury that poses a threat to life or bodily functions  COPD exacerbation: complicated acute illness or injury that poses a threat to life or bodily functions    Amount and/or Complexity of Data Reviewed  Labs: ordered. Decision-making details documented in ED Course.  Radiology: ordered. Decision-making details documented in ED Course.  ECG/medicine tests: ordered. Decision-making details documented in ED Course.    Risk  OTC drugs.  Prescription drug management.  Decision regarding hospitalization.        Final diagnoses:   COPD exacerbation   Acute respiratory failure with hypoxia       ED Disposition  ED  Disposition       ED Disposition   Decision to Admit    Condition   --    Comment   Level of Care: Telemetry [5]   Diagnosis: COPD with exacerbation [433255]   Admitting Physician: CALVIN JONES [[id number]]                 No follow-up provider specified.       Medication List      No changes were made to your prescriptions during this visit.            Darinel Wolfe MD  08/30/24 9449

## 2024-08-30 NOTE — H&P
[facility] Medicine Services  HISTORY AND PHYSICAL    Patient Name: Elvi Keita  : 1955  MRN: [id number]  Primary Care Physician: Maria Alejandra West APRN    Subjective   Subjective     Chief Complaint:shortness of breath    HPI:  Elvi Keita is a 69 y.o. female who  has a past medical history of COPD (chronic obstructive pulmonary disease) and Hypertension. who presented to the ED after two days of lying in bed and today increasing shortness of breath and distress. Her daughter brought her to the ED with OX sats of 71%. She has had a productive cough, no fever or chills, denies chest pain         Review of Systems   Patient denies weight loss, headaches, changes in vision, fever, chills, sore throat, nausea or vomiting, diarrhea, abdominal pain or distension, change in urine output or habits, joint pain, rash, itching, numbness/tingling, weakness or bleeding.    Otherwise complete ROS is negative except as mentioned in the HPI.    Personal History         PMH: She  has a past medical history of COPD (chronic obstructive pulmonary disease) and Hypertension.   PSxH: She  has a past surgical history that includes Toe Surgery and Laparoscopic tubal ligation.         FH: Her family history includes COPD in her mother; No Known Problems in her father.   SH: She  reports that she has been smoking cigarettes. She started smoking about 53 years ago. She has a 53.7 pack-year smoking history. She does not have any smokeless tobacco history on file. She reports that she does not use drugs.     Medications:  Umeclidinium-Vilanterol, albuterol sulfate HFA, bisoprolol-hydrochlorothiazide, gabapentin, hydroCHLOROthiazide, predniSONE, sertraline, traMADol, and traZODone    Allergies   Allergen Reactions    Fish Oil Rash       Objective   Objective     Vital Signs:   Temp:  [97.8 °F (36.6 °C)-99.2 °F (37.3 °C)] 98.5 °F (36.9 °C)  Heart Rate:  [62-77] 69  Resp:  [20-26] 20  BP: (113-160)/(50-69)  160/69  Flow (L/min):  [4-6] 4    Constitutional: Awake, alert, interactive and anxious  Eyes: clear sclerae, no conjunctival injection  HENT: NCAT, mucous membranes moist  Neck: no masses or lymphadenopathy, trachea midline  Respiratory: coarse breath sounds with productive cough  Cardiovascular: RRR, no murmurs appreciated, palpable peripheral pulses  Abdomen:  soft, no HSM or masses palpable, not tender or distended  Musculoskeletal: No peripheral edema, clubbing or cyanosis  Neurologic: Oriented x 3,                       Strength symmetric in all extremities                     Cranial Nerves grossly intact, speech clear  Skin: No rashes or jaundice  Psychiatric: Appropriate mood, insight, anxious      Result Review:  I have personally reviewed the results from the time of this admission   to 8/30/2024 16:16 EDT and agree with these findings:  [x]  Laboratory  [x]  Microbiology  [x]  Radiology  []  EKG/Telemetry   []  Cardiology/Vascular   []  Pathology  [x]  Old records  []  Other:  Most notable findings include: normal labs, CT and CXR reviewed,       LAB RESULTS:      Lab 08/30/24  0944   WBC 8.34   HEMOGLOBIN 14.9   HEMATOCRIT 49.6*   PLATELETS 181   NEUTROS ABS 6.21   IMMATURE GRANS (ABS) 0.02   LYMPHS ABS 1.40   MONOS ABS 0.59   EOS ABS 0.06   .0*         Lab 08/30/24  0944   SODIUM 135*   POTASSIUM 4.5   CHLORIDE 93*   CO2 36.0*   ANION GAP 6.0   BUN 11   CREATININE 1.12*   EGFR 53.3*   GLUCOSE 109*   CALCIUM 8.9         Lab 08/30/24  0944   TOTAL PROTEIN 6.2   ALBUMIN 3.9   GLOBULIN 2.3   ALT (SGPT) 14   AST (SGOT) 16   BILIRUBIN 0.4   ALK PHOS 83         Lab 08/30/24  0944   PROBNP 6,982.0*   HSTROP T 21*                 Brief Urine Lab Results       None          COVID19   Date Value Ref Range Status   08/30/2024 Not Detected Not Detected - Ref. Range Final       CT Angiogram Chest    Result Date: 8/30/2024  CT ANGIOGRAM CHEST Date of Exam: 8/30/2024 11:32 AM EDT Indication: PE protocol.  Comparison: Chest CTA dated 10/27/2021 Technique: CTA of the chest was performed after the uneventful intravenous administration of 80 mL Isovue-370. Reconstructed coronal and sagittal images were also obtained. In addition, a 3-D volume rendered image was created for interpretation. Automated exposure control and iterative reconstruction methods were used. FINDINGS: Thoracic inlet: Unremarkable. Pulmonary arteries: No filling defects are identified within the pulmonary arteries to suggest acute pulmonary embolism. Great vessels: Atherosclerotic plaque is seen within the thoracic aorta and proximal arch vessels. Mediastinum/Alyce: No pathologically enlarged mediastinal lymph nodes are seen. The esophagus appears unremarkable. Lung parenchyma: Lungs appear emphysematous with mild bibasilar atelectasis. No acute infiltrate is identified. No suspicious pulmonary nodules are seen. Trachea and airways: The trachea and central airways appear unremarkable. Pleural space: No significant pleural effusion or pneumothorax. Heart and pericardium: Coronary artery calcifications. Otherwise, the heart and pericardium appear unremarkable. Chest wall: No acute or suspicious osseous or soft tissue lesion is identified. Upper abdomen: No acute abnormality is identified within the visualized upper abdomen.     Impression: 1.No acute abnormality is identified within the thorax. Specifically, there is no evidence of acute pulmonary embolism. 2.Pulmonary emphysema. Please correlate with patient's smoking history/risk factors to determine whether the patient meets criteria for routine lung cancer screening with low dose chest CT. 3.Additional findings as detailed above. Electronically Signed: Gunnar Aparicio MD  8/30/2024 11:50 AM EDT  Workstation ID: [id number]    XR Chest 1 View    Result Date: 8/30/2024  XR CHEST 1 VW Date of Exam: 8/30/2024 9:55 AM EDT Indication: SOA triage protocol Comparison: 10/27/2021. Findings: Heart and pulmonary  vessels appear within normal limits for portable technique. Lung fields appear clear of acute infiltrates or effusions.     Impression: Impression: No acute process. Electronically Signed: Latasha Thomas MD  8/30/2024 10:10 AM EDT  Workstation ID: [id number]         The patient has started, but not completed, their COVID-19 vaccination series.    Assessment & Plan   Assessment / Plan       COPD with exacerbation    Chronic hypoxemic respiratory failure    Cor pulmonale    Hyperlipidemia    Lumbar spondylosis      Assessment & Plan:    68 yo with HO HTN, HLP, COPD who presents with acute illness and respiratory distress     Acute on Chronic hypoxic respiratory failure  COPD Exacerbation  -hydrate, steroids, scheduled and PRN nebs  -sputum culture  -doxycycline  -check inflammatory labs  -no evidence of PE     HTN  HLP  -not on a statin, cont bisoprolol, consider different agent  -check echo  -needs ischemic risk stratification not urgently     Nicotine dependence  Anxiety  -prn benzos and NR    VTE Prophylaxis:  Pharmacologic VTE prophylaxis orders are present.        CODE STATUS:  Code Status (Patient has no pulse and is not breathing): CPR (Attempt to Resuscitate)  Medical Interventions (Patient has pulse or is breathing): Full Support    Expected Discharge  Expected Discharge Date: 9/2/2024; Expected Discharge Time:     Electronically signed by Reyna Sanchez MD 08/30/24 16:16 EDT

## 2024-08-30 NOTE — Clinical Note
Level of Care: Telemetry [5]   Diagnosis: COPD with exacerbation [970842]   Admitting Physician: CALVIN JONES [[id number]]

## 2024-08-31 ENCOUNTER — APPOINTMENT (OUTPATIENT)
Dept: CARDIOLOGY | Facility: HOSPITAL | Age: 69
End: 2024-08-31
Payer: MEDICARE

## 2024-08-31 PROBLEM — J96.21 ACUTE AND CHRONIC RESPIRATORY FAILURE WITH HYPOXIA: Status: ACTIVE | Noted: 2024-08-31

## 2024-08-31 PROBLEM — I10 ESSENTIAL HYPERTENSION: Status: ACTIVE | Noted: 2024-08-31

## 2024-08-31 PROBLEM — J44.9 COPD (CHRONIC OBSTRUCTIVE PULMONARY DISEASE): Status: ACTIVE | Noted: 2024-08-31

## 2024-08-31 PROBLEM — J44.9 COPD (CHRONIC OBSTRUCTIVE PULMONARY DISEASE): Status: RESOLVED | Noted: 2024-08-31 | Resolved: 2024-08-31

## 2024-08-31 LAB
AORTIC DIMENSIONLESS INDEX: 0.66 (DI)
ASCENDING AORTA: 3.1 CM
BH CV ECHO MEAS - AO MAX PG: 22 MMHG
BH CV ECHO MEAS - AO MEAN PG: 12 MMHG
BH CV ECHO MEAS - AO ROOT DIAM: 3 CM
BH CV ECHO MEAS - AO V2 MAX: 236 CM/SEC
BH CV ECHO MEAS - AO V2 VTI: 48.5 CM
BH CV ECHO MEAS - AVA(I,D): 2.1 CM2
BH CV ECHO MEAS - EF(MOD-BP): 75 %
BH CV ECHO MEAS - IVS/LVPW: 0.75 CM
BH CV ECHO MEAS - IVSD: 0.9 CM
BH CV ECHO MEAS - LA DIMENSION: 4.5 CM
BH CV ECHO MEAS - LAT PEAK E' VEL: 10.1 CM/SEC
BH CV ECHO MEAS - LV MAX PG: 5.6 MMHG
BH CV ECHO MEAS - LV MEAN PG: 2.9 MMHG
BH CV ECHO MEAS - LV V1 MAX: 117.9 CM/SEC
BH CV ECHO MEAS - LV V1 VTI: 29.4 CM
BH CV ECHO MEAS - LVIDD: 4.1 CM
BH CV ECHO MEAS - LVIDS: 2.1 CM
BH CV ECHO MEAS - LVOT DIAM: 2.1 CM
BH CV ECHO MEAS - LVPWD: 1.2 CM
BH CV ECHO MEAS - MED PEAK E' VEL: 9 CM/SEC
BH CV ECHO MEAS - MV A MAX VEL: 91 CM/SEC
BH CV ECHO MEAS - MV DEC SLOPE: 600 CM/SEC2
BH CV ECHO MEAS - MV E MAX VEL: 127 CM/SEC
BH CV ECHO MEAS - MV E/A: 1.46
BH CV ECHO MEAS - MV P1/2T: 75 MSEC
BH CV ECHO MEAS - MVA(P1/2T): 2.9 CM2
BH CV ECHO MEAS - PA ACC TIME: 0.12 SEC
BH CV ECHO MEAS - PA V2 MAX: 126.4 CM/SEC
BH CV ECHO MEAS - PAPD(PI EDV): 2 MMHG
BH CV ECHO MEAS - PI END-D VEL: 71.1 CM/SEC
BH CV ECHO MEAS - RAP SYSTOLE: 8 MMHG
BH CV ECHO MEAS - RV MAX PG: 3.9 MMHG
BH CV ECHO MEAS - RV V1 MAX: 98.4 CM/SEC
BH CV ECHO MEAS - RV V1 VTI: 19.5 CM
BH CV ECHO MEAS - RVSP: 29 MMHG
BH CV ECHO MEAS - TAPSE (>1.6): 2.7 CM
BH CV ECHO MEAS - TR MAX PG: 20.7 MMHG
BH CV ECHO MEAS - TR MAX VEL: 227.7 CM/SEC
BH CV ECHO MEASUREMENTS AVERAGE E/E' RATIO: 13.3
BH CV VAS BP LEFT ARM: NORMAL MMHG
BH CV XLRA - RV BASE: 4.8 CM
BH CV XLRA - RV LENGTH: 7.9 CM
BH CV XLRA - RV MID: 4.1 CM
BH CV XLRA - TDI S': 14.7 CM/SEC
IVRT: 47 MS
LEFT ATRIUM VOLUME INDEX: 35.3 ML/M2
LV EF 2D ECHO EST: 75 %

## 2024-08-31 PROCEDURE — 99232 SBSQ HOSP IP/OBS MODERATE 35: CPT | Performed by: INTERNAL MEDICINE

## 2024-08-31 PROCEDURE — 94668 MNPJ CHEST WALL SBSQ: CPT

## 2024-08-31 PROCEDURE — 94799 UNLISTED PULMONARY SVC/PX: CPT

## 2024-08-31 PROCEDURE — 94664 DEMO&/EVAL PT USE INHALER: CPT

## 2024-08-31 PROCEDURE — 25010000002 METHYLPREDNISOLONE PER 125 MG: Performed by: INTERNAL MEDICINE

## 2024-08-31 PROCEDURE — 25010000002 ENOXAPARIN PER 10 MG: Performed by: INTERNAL MEDICINE

## 2024-08-31 PROCEDURE — 93306 TTE W/DOPPLER COMPLETE: CPT

## 2024-08-31 PROCEDURE — 93306 TTE W/DOPPLER COMPLETE: CPT | Performed by: INTERNAL MEDICINE

## 2024-08-31 RX ORDER — AMLODIPINE BESYLATE 5 MG/1
5 TABLET ORAL
Status: DISCONTINUED | OUTPATIENT
Start: 2024-08-31 | End: 2024-09-03 | Stop reason: HOSPADM

## 2024-08-31 RX ORDER — ENOXAPARIN SODIUM 100 MG/ML
40 INJECTION SUBCUTANEOUS
Status: DISCONTINUED | OUTPATIENT
Start: 2024-08-31 | End: 2024-09-03 | Stop reason: HOSPADM

## 2024-08-31 RX ADMIN — ALPRAZOLAM 0.25 MG: 0.25 TABLET ORAL at 21:06

## 2024-08-31 RX ADMIN — BISOPROLOL FUMARATE 10 MG: 10 TABLET ORAL at 08:18

## 2024-08-31 RX ADMIN — DOXYCYCLINE 100 MG: 100 CAPSULE ORAL at 21:06

## 2024-08-31 RX ADMIN — IPRATROPIUM BROMIDE AND ALBUTEROL SULFATE 3 ML: 2.5; .5 SOLUTION RESPIRATORY (INHALATION) at 12:20

## 2024-08-31 RX ADMIN — GABAPENTIN 800 MG: 400 CAPSULE ORAL at 21:06

## 2024-08-31 RX ADMIN — IPRATROPIUM BROMIDE AND ALBUTEROL SULFATE 3 ML: 2.5; .5 SOLUTION RESPIRATORY (INHALATION) at 09:00

## 2024-08-31 RX ADMIN — AMLODIPINE BESYLATE 5 MG: 5 TABLET ORAL at 15:54

## 2024-08-31 RX ADMIN — METHYLPREDNISOLONE SODIUM SUCCINATE 60 MG: 125 INJECTION, POWDER, FOR SOLUTION INTRAMUSCULAR; INTRAVENOUS at 21:06

## 2024-08-31 RX ADMIN — IPRATROPIUM BROMIDE AND ALBUTEROL SULFATE 3 ML: 2.5; .5 SOLUTION RESPIRATORY (INHALATION) at 20:12

## 2024-08-31 RX ADMIN — NICOTINE 1 PATCH: 21 PATCH TRANSDERMAL at 08:22

## 2024-08-31 RX ADMIN — FAMOTIDINE 20 MG: 20 TABLET, FILM COATED ORAL at 08:18

## 2024-08-31 RX ADMIN — GABAPENTIN 800 MG: 400 CAPSULE ORAL at 13:37

## 2024-08-31 RX ADMIN — METHYLPREDNISOLONE SODIUM SUCCINATE 60 MG: 125 INJECTION, POWDER, FOR SOLUTION INTRAMUSCULAR; INTRAVENOUS at 08:18

## 2024-08-31 RX ADMIN — GABAPENTIN 800 MG: 400 CAPSULE ORAL at 08:18

## 2024-08-31 RX ADMIN — FAMOTIDINE 20 MG: 20 TABLET, FILM COATED ORAL at 15:54

## 2024-08-31 RX ADMIN — ENOXAPARIN SODIUM 40 MG: 100 INJECTION SUBCUTANEOUS at 10:19

## 2024-08-31 RX ADMIN — TRAZODONE HYDROCHLORIDE 100 MG: 100 TABLET ORAL at 21:06

## 2024-08-31 RX ADMIN — DOXYCYCLINE 100 MG: 100 CAPSULE ORAL at 08:18

## 2024-08-31 NOTE — PROGRESS NOTES
[facility] Medicine Services  INPATIENT PROGRESS NOTE    Date of Admission: 8/30/2024  Primary Care Physician: Maria Alejandra West APRN    Subjective     Chief Complaint: shortness of breath    HPI:Patient minimally better, had a coughing spell this morning and sats dropped in to the 70s    Review Of Systems:   Patient denies headaches, fever, chills,  chest pain, cough, abdominal pain, nausea or vomiting, diarrhea, rash, itching or bleeding      Objective      Vitals:  Temp:  [97.8 °F (36.6 °C)-98.5 °F (36.9 °C)] 98.1 °F (36.7 °C)  Heart Rate:  [66-83] 73  Resp:  [16-20] 18  BP: (137-180)/(68-86) 137/76  Flow (L/min):  [4-6] 4    Patient is alert and talkative short of breath at rest  Neck is without mass or JVD  Heart is Reg wo murmur  Lungs diffuse wheezing but improved air movement  Abd is soft without HSM or mass, not distended or tender to palpation  MAEW, no clubbing cyanosis or edema  Skin is without rash  Neurologic exam is nonfocal   Mood is appropriate, agitated      Results Review:    I have reviewed the labs, radiology results and diagnostic studies.    Results from last 7 days   Lab Units 08/30/24  0944   WBC 10*3/mm3 8.34   HEMOGLOBIN g/dL 14.9   HEMATOCRIT % 49.6*   PLATELETS 10*3/mm3 181     Results from last 7 days   Lab Units 08/30/24  0944   SODIUM mmol/L 135*   POTASSIUM mmol/L 4.5   CHLORIDE mmol/L 93*   CO2 mmol/L 36.0*   BUN mg/dL 11   CREATININE mg/dL 1.12*   GLUCOSE mg/dL 109*   CALCIUM mg/dL 8.9   ALK PHOS U/L 83   ALT (SGPT) U/L 14   AST (SGOT) U/L 16       Microbiology Results Abnormal       Procedure Component Value - Date/Time    Respiratory Panel PCR w/COVID-19(SARS-CoV-2) ALIYA/DL/MARCELL/PAD/COR/RIYA In-House, NP Swab in UTM/VTM, 2 HR TAT - Swab, Nasopharynx [[id number]]  (Normal) Collected: 08/30/24 1021    Lab Status: Final result Specimen: Swab from Nasopharynx Updated: 08/30/24 1126     ADENOVIRUS, PCR Not Detected     Coronavirus 229E Not Detected     Coronavirus  HKU1 Not Detected     Coronavirus NL63 Not Detected     Coronavirus OC43 Not Detected     COVID19 Not Detected     Human Metapneumovirus Not Detected     Human Rhinovirus/Enterovirus Not Detected     Influenza A PCR Not Detected     Influenza B PCR Not Detected     Parainfluenza Virus 1 Not Detected     Parainfluenza Virus 2 Not Detected     Parainfluenza Virus 3 Not Detected     Parainfluenza Virus 4 Not Detected     RSV, PCR Not Detected     Bordetella pertussis pcr Not Detected     Bordetella parapertussis PCR Not Detected     Chlamydophila pneumoniae PCR Not Detected     Mycoplasma pneumo by PCR Not Detected    Narrative:      In the setting of a positive respiratory panel with a viral infection PLUS a negative procalcitonin without other underlying concern for bacterial infection, consider observing off antibiotics or discontinuation of antibiotics and continue supportive care. If the respiratory panel is positive for atypical bacterial infection (Bordetella pertussis, Chlamydophila pneumoniae, or Mycoplasma pneumoniae), consider antibiotic de-escalation to target atypical bacterial infection.          CT Angiogram Chest    Result Date: 8/30/2024  1.No acute abnormality is identified within the thorax. Specifically, there is no evidence of acute pulmonary embolism. 2.Pulmonary emphysema. Please correlate with patient's smoking history/risk factors to determine whether the patient meets criteria for routine lung cancer screening with low dose chest CT. 3.Additional findings as detailed above. Electronically Signed: Gunnar Aparicio MD  8/30/2024 11:50 AM EDT  Workstation ID: [id number]    XR Chest 1 View    Result Date: 8/30/2024  Impression: No acute process. Electronically Signed: Latasha Thomas MD  8/30/2024 10:10 AM EDT  Workstation ID: [id number]       I have reviewed the medications.    Assessment/Plan     Assessment/Problem List    COPD with exacerbation    Cor pulmonale    Hyperlipidemia    Lumbar  spondylosis    Essential hypertension    Acute and chronic respiratory failure with hypoxia      Plan  70 yo with HO HTN, HLP, COPD who presents with acute illness and respiratory distress    Acute on Chronic hypoxic respiratory failure  COPD Exacerbation  -hydrate, steroids, scheduled and PRN nebs  -sputum culture  -doxycycline  -check inflammatory labs  -no evidence of PE    HTN  HLP  -not on a statin, cont bisoprolol, consider different agent  -check echo  -needs ischemic risk stratification not urgently    Nicotine dependence  Anxiety  -prn benzos and NRT    VTE Prophylaxis:  Pharmacologic VTE prophylaxis orders are present.    Code Status (Patient has no pulse and is not breathing): CPR (Attempt to Resuscitate)  Medical Interventions (Patient has pulse or is breathing): Full Support      Expected Discharge  Expected Discharge Date: 9/2/2024; Expected Discharge Time:     Electronically signed by Reyna Sanchez MD, 08/31/24

## 2024-08-31 NOTE — PAYOR COMM NOTE
"Bryn Keita (69 y.o. Female)       Date of Birth   1955    Social Security Number       Address   [address] [place] [id number]    Home Phone   [phone]    MRN   [id number]       Yazdanism   None    Marital Status                               Admission Date   24    Admission Type   Emergency    Admitting Provider   Reyna Sanchez MD    Attending Provider   Reyna Sanchze MD    Department, Room/Bed   [address], S516/1       Discharge Date       Discharge Disposition       Discharge Destination                                 Attending Provider: Reyna Sanchez MD    Allergies: Fish Oil    Isolation: None   Infection: None   Code Status: CPR    Ht: 162.6 cm (64\")   Wt: 74.9 kg (165 lb 3.2 oz)    Admission Cmt: None   Principal Problem: COPD with exacerbation [J44.1]                   Active Insurance as of 2024       Primary Coverage       Payor Plan Insurance Group Employer/Plan Group    ANTHEM MEDICARE REPLACEMENT ANTHEM MEDICARE ADVANTAGE [id number]       Payor Plan Address Payor Plan Phone Number Payor Plan Fax Number Effective Dates    [address] [phone]  2019 - None Entered    [facility] [id number]         Subscriber Name Subscriber Birth Date Member ID       BRYN KEITA 1955 [id number]                     Emergency Contacts        (Rel.) Home Phone Work Phone Mobile Phone    JESENIA KEITA (Spouse) [phone] -- --    AARON WYLIE (Daughter) -- -- [phone]    BRYN WYLIE (Daughter) -- -- [phone]                 History & Physical        Reyna Sanchez MD at [address] [place] [address] Medicine Services  HISTORY AND PHYSICAL    Patient Name: Bryn Keita  : 1955  MRN: [id number]  Primary Care Physician: Maria Alejandra West APRN    Subjective   Subjective     Chief Complaint:shortness of breath    HPI:  Bryn Keita is a 69 y.o. female who  has a past medical history of " COPD (chronic obstructive pulmonary disease) and Hypertension. who presented to the ED after two days of lying in bed and today increasing shortness of breath and distress. Her daughter brought her to the ED with OX sats of 71%. She has had a productive cough, no fever or chills, denies chest pain         Review of Systems   Patient denies weight loss, headaches, changes in vision, fever, chills, sore throat, nausea or vomiting, diarrhea, abdominal pain or distension, change in urine output or habits, joint pain, rash, itching, numbness/tingling, weakness or bleeding.    Otherwise complete ROS is negative except as mentioned in the HPI.    Personal History         PMH: She  has a past medical history of COPD (chronic obstructive pulmonary disease) and Hypertension.   PSxH: She  has a past surgical history that includes Toe Surgery and Laparoscopic tubal ligation.         FH: Her family history includes COPD in her mother; No Known Problems in her father.   SH: She  reports that she has been smoking cigarettes. She started smoking about 53 years ago. She has a 53.7 pack-year smoking history. She does not have any smokeless tobacco history on file. She reports that she does not use drugs.     Medications:  Umeclidinium-Vilanterol, albuterol sulfate HFA, bisoprolol-hydrochlorothiazide, gabapentin, hydroCHLOROthiazide, predniSONE, sertraline, traMADol, and traZODone    Allergies   Allergen Reactions    Fish Oil Rash       Objective   Objective     Vital Signs:   Temp:  [97.8 °F (36.6 °C)-99.2 °F (37.3 °C)] 98.5 °F (36.9 °C)  Heart Rate:  [62-77] 69  Resp:  [20-26] 20  BP: (113-160)/(50-69) 160/69  Flow (L/min):  [4-6] 4    Constitutional: Awake, alert, interactive and anxious  Eyes: clear sclerae, no conjunctival injection  HENT: NCAT, mucous membranes moist  Neck: no masses or lymphadenopathy, trachea midline  Respiratory: coarse breath sounds with productive cough  Cardiovascular: RRR, no murmurs appreciated, palpable  peripheral pulses  Abdomen:  soft, no HSM or masses palpable, not tender or distended  Musculoskeletal: No peripheral edema, clubbing or cyanosis  Neurologic: Oriented x 3,                       Strength symmetric in all extremities                     Cranial Nerves grossly intact, speech clear  Skin: No rashes or jaundice  Psychiatric: Appropriate mood, insight, anxious      Result Review:  I have personally reviewed the results from the time of this admission   to 8/30/2024 16:16 EDT and agree with these findings:  [x]  Laboratory  [x]  Microbiology  [x]  Radiology  []  EKG/Telemetry   []  Cardiology/Vascular   []  Pathology  [x]  Old records  []  Other:  Most notable findings include: normal labs, CT and CXR reviewed,       LAB RESULTS:      Lab 08/30/24  0944   WBC 8.34   HEMOGLOBIN 14.9   HEMATOCRIT 49.6*   PLATELETS 181   NEUTROS ABS 6.21   IMMATURE GRANS (ABS) 0.02   LYMPHS ABS 1.40   MONOS ABS 0.59   EOS ABS 0.06   .0*         Lab 08/30/24  0944   SODIUM 135*   POTASSIUM 4.5   CHLORIDE 93*   CO2 36.0*   ANION GAP 6.0   BUN 11   CREATININE 1.12*   EGFR 53.3*   GLUCOSE 109*   CALCIUM 8.9         Lab 08/30/24  0944   TOTAL PROTEIN 6.2   ALBUMIN 3.9   GLOBULIN 2.3   ALT (SGPT) 14   AST (SGOT) 16   BILIRUBIN 0.4   ALK PHOS 83         Lab 08/30/24  0944   PROBNP 6,982.0*   HSTROP T 21*                 Brief Urine Lab Results       None          COVID19   Date Value Ref Range Status   08/30/2024 Not Detected Not Detected - Ref. Range Final       CT Angiogram Chest    Result Date: 8/30/2024  CT ANGIOGRAM CHEST Date of Exam: 8/30/2024 11:32 AM EDT Indication: PE protocol. Comparison: Chest CTA dated 10/27/2021 Technique: CTA of the chest was performed after the uneventful intravenous administration of 80 mL Isovue-370. Reconstructed coronal and sagittal images were also obtained. In addition, a 3-D volume rendered image was created for interpretation. Automated exposure control and iterative reconstruction  methods were used. FINDINGS: Thoracic inlet: Unremarkable. Pulmonary arteries: No filling defects are identified within the pulmonary arteries to suggest acute pulmonary embolism. Great vessels: Atherosclerotic plaque is seen within the thoracic aorta and proximal arch vessels. Mediastinum/Alyce: No pathologically enlarged mediastinal lymph nodes are seen. The esophagus appears unremarkable. Lung parenchyma: Lungs appear emphysematous with mild bibasilar atelectasis. No acute infiltrate is identified. No suspicious pulmonary nodules are seen. Trachea and airways: The trachea and central airways appear unremarkable. Pleural space: No significant pleural effusion or pneumothorax. Heart and pericardium: Coronary artery calcifications. Otherwise, the heart and pericardium appear unremarkable. Chest wall: No acute or suspicious osseous or soft tissue lesion is identified. Upper abdomen: No acute abnormality is identified within the visualized upper abdomen.     Impression: 1.No acute abnormality is identified within the thorax. Specifically, there is no evidence of acute pulmonary embolism. 2.Pulmonary emphysema. Please correlate with patient's smoking history/risk factors to determine whether the patient meets criteria for routine lung cancer screening with low dose chest CT. 3.Additional findings as detailed above. Electronically Signed: Gunnar Aparicio MD  8/30/2024 11:50 AM EDT  Workstation ID: [id number]    XR Chest 1 View    Result Date: 8/30/2024  XR CHEST 1 VW Date of Exam: 8/30/2024 9:55 AM EDT Indication: SOA triage protocol Comparison: 10/27/2021. Findings: Heart and pulmonary vessels appear within normal limits for portable technique. Lung fields appear clear of acute infiltrates or effusions.     Impression: Impression: No acute process. Electronically Signed: Latasha Thomas MD  8/30/2024 10:10 AM EDT  Workstation ID: [id number]         The patient has started, but not completed, their COVID-19 vaccination  series.    Assessment & Plan   Assessment / Plan       COPD with exacerbation    Chronic hypoxemic respiratory failure    Cor pulmonale    Hyperlipidemia    Lumbar spondylosis      Assessment & Plan:    68 yo with HO HTN, HLP, COPD who presents with acute illness and respiratory distress     Acute on Chronic hypoxic respiratory failure  COPD Exacerbation  -hydrate, steroids, scheduled and PRN nebs  -sputum culture  -doxycycline  -check inflammatory labs  -no evidence of PE     HTN  HLP  -not on a statin, cont bisoprolol, consider different agent  -check echo  -needs ischemic risk stratification not urgently     Nicotine dependence  Anxiety  -prn benzos and NR    VTE Prophylaxis:  Pharmacologic VTE prophylaxis orders are present.        CODE STATUS:  Code Status (Patient has no pulse and is not breathing): CPR (Attempt to Resuscitate)  Medical Interventions (Patient has pulse or is breathing): Full Support    Expected Discharge  Expected Discharge Date: 9/2/2024; Expected Discharge Time:     Electronically signed by Reyna Sanchez MD 08/30/24 16:16 EDT            Electronically signed by Reyna Sanchez MD at 08/31/24 0847          Physician Progress Notes (all)        Reyna Sanchez MD at 08/31/24 0839              [facility] Medicine Services  INPATIENT PROGRESS NOTE    Date of Admission: 8/30/2024  Primary Care Physician: Maria Alejandra West APRN    Subjective     Chief Complaint: shortness of breath    HPI:patient has been sick in the bed for two days, daughter convinced her to come to the hospital today. She has had a productive cough, denies fever, or chills. She has oxygen for nights but does not use it during the day. She continues to smoke 1 ppd.  Her RA sats were 71% upon arrival.    Review Of Systems:   Patient denies headaches, fever, chills,  chest pain, cough, abdominal pain, nausea or vomiting, diarrhea, rash, itching or bleeding      Objective      Vitals:  Temp:  [97.8 °F  (36.6 °C)-99.2 °F (37.3 °C)] 97.9 °F (36.6 °C)  Heart Rate:  [62-83] 72  Resp:  [16-26] 18  BP: (113-180)/(50-86) 180/79  Flow (L/min):  [4-6] 6    Patient is alert and talkative short of breath at rest  Neck is without mass or JVD  Heart is Reg wo murmur  Lungs diffuse wheezing and poor air movement  Abd is soft without HSM or mass, not distended or tender to palpation  MAEW, no clubbing cyanosis or edema  Skin is without rash  Neurologic exam is nonfocal   Mood is appropriate, agitated      Results Review:    I have reviewed the labs, radiology results and diagnostic studies.    Results from last 7 days   Lab Units 08/30/24  0944   WBC 10*3/mm3 8.34   HEMOGLOBIN g/dL 14.9   HEMATOCRIT % 49.6*   PLATELETS 10*3/mm3 181     Results from last 7 days   Lab Units 08/30/24  0944   SODIUM mmol/L 135*   POTASSIUM mmol/L 4.5   CHLORIDE mmol/L 93*   CO2 mmol/L 36.0*   BUN mg/dL 11   CREATININE mg/dL 1.12*   GLUCOSE mg/dL 109*   CALCIUM mg/dL 8.9   ALK PHOS U/L 83   ALT (SGPT) U/L 14   AST (SGOT) U/L 16       Microbiology Results Abnormal       Procedure Component Value - Date/Time    Respiratory Panel PCR w/COVID-19(SARS-CoV-2) ALIYA/DL/MARCELL/PAD/COR/RIYA In-House, NP Swab in UTM/VTM, 2 HR TAT - Swab, Nasopharynx [[id number]]  (Normal) Collected: 08/30/24 1021    Lab Status: Final result Specimen: Swab from Nasopharynx Updated: 08/30/24 1126     ADENOVIRUS, PCR Not Detected     Coronavirus 229E Not Detected     Coronavirus HKU1 Not Detected     Coronavirus NL63 Not Detected     Coronavirus OC43 Not Detected     COVID19 Not Detected     Human Metapneumovirus Not Detected     Human Rhinovirus/Enterovirus Not Detected     Influenza A PCR Not Detected     Influenza B PCR Not Detected     Parainfluenza Virus 1 Not Detected     Parainfluenza Virus 2 Not Detected     Parainfluenza Virus 3 Not Detected     Parainfluenza Virus 4 Not Detected     RSV, PCR Not Detected     Bordetella pertussis pcr Not Detected     Bordetella parapertussis  PCR Not Detected     Chlamydophila pneumoniae PCR Not Detected     Mycoplasma pneumo by PCR Not Detected    Narrative:      In the setting of a positive respiratory panel with a viral infection PLUS a negative procalcitonin without other underlying concern for bacterial infection, consider observing off antibiotics or discontinuation of antibiotics and continue supportive care. If the respiratory panel is positive for atypical bacterial infection (Bordetella pertussis, Chlamydophila pneumoniae, or Mycoplasma pneumoniae), consider antibiotic de-escalation to target atypical bacterial infection.          CT Angiogram Chest    Result Date: 8/30/2024  1.No acute abnormality is identified within the thorax. Specifically, there is no evidence of acute pulmonary embolism. 2.Pulmonary emphysema. Please correlate with patient's smoking history/risk factors to determine whether the patient meets criteria for routine lung cancer screening with low dose chest CT. 3.Additional findings as detailed above. Electronically Signed: Gunnar Aparicio MD  8/30/2024 11:50 AM EDT  Workstation ID: [id number]    XR Chest 1 View    Result Date: 8/30/2024  Impression: No acute process. Electronically Signed: Latasha Thomas MD  8/30/2024 10:10 AM EDT  Workstation ID: [id number]       I have reviewed the medications.    Assessment/Plan     Assessment/Problem List    COPD with exacerbation    Cor pulmonale    Hyperlipidemia    Lumbar spondylosis    Essential hypertension    Acute and chronic respiratory failure with hypoxia      Plan  70 yo with HO HTN, HLP, COPD who presents with acute illness and respiratory distress    Acute on Chronic hypoxic respiratory failure  COPD Exacerbation  -hydrate, steroids, scheduled and PRN nebs  -sputum culture  -doxycycline  -check inflammatory labs  -no evidence of PE    HTN  HLP  -not on a statin, cont bisoprolol, consider different agent  -check echo  -needs ischemic risk stratification not urgently    Nicotine  dependence  Anxiety  -prn benzos and NRT    VTE Prophylaxis:  Pharmacologic VTE prophylaxis orders are present.    Code Status (Patient has no pulse and is not breathing): CPR (Attempt to Resuscitate)  Medical Interventions (Patient has pulse or is breathing): Full Support      Expected Discharge  Expected Discharge Date: 9/2/2024; Expected Discharge Time:     Electronically signed by Reyna Sanchez MD, 08/31/24      Electronically signed by Reyna Sanchez MD at 08/31/24 0844

## 2024-08-31 NOTE — PLAN OF CARE
Problem: Adult Inpatient Plan of Care  Goal: Plan of Care Review  Outcome: Ongoing, Progressing  Goal: Patient-Specific Goal (Individualized)  Outcome: Ongoing, Progressing  Goal: Absence of Hospital-Acquired Illness or Injury  Outcome: Ongoing, Progressing  Intervention: Identify and Manage Fall Risk  Recent Flowsheet Documentation  Taken 8/31/2024 1400 by Zakia Obando RN  Safety Promotion/Fall Prevention:   activity supervised   safety round/check completed  Taken 8/31/2024 1200 by Zakia Obando RN  Safety Promotion/Fall Prevention:   activity supervised   safety round/check completed  Taken 8/31/2024 1000 by Zakia Obando RN  Safety Promotion/Fall Prevention:   activity supervised   safety round/check completed  Taken 8/31/2024 0800 by Zakia Obando RN  Safety Promotion/Fall Prevention:   activity supervised   safety round/check completed  Intervention: Prevent Skin Injury  Recent Flowsheet Documentation  Taken 8/31/2024 1400 by Zakia Obando RN  Body Position: position changed independently  Skin Protection:   adhesive use limited   transparent dressing maintained   tubing/devices free from skin contact  Taken 8/31/2024 1200 by Zakia Obando RN  Body Position: position changed independently  Skin Protection:   adhesive use limited   transparent dressing maintained   tubing/devices free from skin contact  Taken 8/31/2024 1000 by Zakia Obando RN  Body Position: position changed independently  Skin Protection:   adhesive use limited   transparent dressing maintained   tubing/devices free from skin contact  Taken 8/31/2024 0800 by Zakia Obando RN  Body Position: position changed independently  Skin Protection:   adhesive use limited   transparent dressing maintained   tubing/devices free from skin contact  Intervention: Prevent and Manage VTE (Venous Thromboembolism) Risk  Recent Flowsheet Documentation  Taken 8/31/2024 1400 by Zakia Obando RN  Activity Management: activity minimized  Taken 8/31/2024  1200 by Topher, Zakia, RN  Activity Management: activity minimized  Taken 8/31/2024 1000 by Zakia Obando RN  Activity Management: activity minimized  Taken 8/31/2024 0800 by Zakia Obando RN  Activity Management: activity minimized  Goal: Optimal Comfort and Wellbeing  Outcome: Ongoing, Progressing  Goal: Readiness for Transition of Care  Outcome: Ongoing, Progressing     Problem: COPD (Chronic Obstructive Pulmonary Disease) Comorbidity  Goal: Maintenance of COPD Symptom Control  Outcome: Ongoing, Progressing     Problem: Skin Injury Risk Increased  Goal: Skin Health and Integrity  Outcome: Ongoing, Progressing  Intervention: Optimize Skin Protection  Recent Flowsheet Documentation  Taken 8/31/2024 1400 by Zakia Obando RN  Pressure Reduction Techniques: frequent weight shift encouraged  Pressure Reduction Devices: pressure-redistributing mattress utilized  Skin Protection:   adhesive use limited   transparent dressing maintained   tubing/devices free from skin contact  Taken 8/31/2024 1200 by Zakia Obando RN  Pressure Reduction Techniques: frequent weight shift encouraged  Pressure Reduction Devices: pressure-redistributing mattress utilized  Skin Protection:   adhesive use limited   transparent dressing maintained   tubing/devices free from skin contact  Taken 8/31/2024 1000 by Zakia Obando RN  Pressure Reduction Techniques: frequent weight shift encouraged  Pressure Reduction Devices: pressure-redistributing mattress utilized  Skin Protection:   adhesive use limited   transparent dressing maintained   tubing/devices free from skin contact  Taken 8/31/2024 0800 by Zakia Obando RN  Pressure Reduction Techniques: frequent weight shift encouraged  Pressure Reduction Devices: pressure-redistributing mattress utilized  Skin Protection:   adhesive use limited   transparent dressing maintained   tubing/devices free from skin contact     Problem: Fall Injury Risk  Goal: Absence of Fall and Fall-Related  Injury  Outcome: Ongoing, Progressing  Intervention: Promote Injury-Free Environment  Recent Flowsheet Documentation  Taken 8/31/2024 1400 by Zakia Obando, RN  Safety Promotion/Fall Prevention:   activity supervised   safety round/check completed  Taken 8/31/2024 1200 by Zakia Obando, FABIOLA  Safety Promotion/Fall Prevention:   activity supervised   safety round/check completed  Taken 8/31/2024 1000 by Zakia Obando, FABIOLA  Safety Promotion/Fall Prevention:   activity supervised   safety round/check completed  Taken 8/31/2024 0800 by Zakia Obando, RN  Safety Promotion/Fall Prevention:   activity supervised   safety round/check completed   Goal Outcome Evaluation:

## 2024-09-01 LAB
ALBUMIN SERPL-MCNC: 3.2 G/DL (ref 3.5–5.2)
ALBUMIN/GLOB SERPL: 1.7 G/DL
ALP SERPL-CCNC: 58 U/L (ref 39–117)
ALT SERPL W P-5'-P-CCNC: 15 U/L (ref 1–33)
ANION GAP SERPL CALCULATED.3IONS-SCNC: 2 MMOL/L (ref 5–15)
AST SERPL-CCNC: 30 U/L (ref 1–32)
BASOPHILS # BLD AUTO: 0.02 10*3/MM3 (ref 0–0.2)
BASOPHILS NFR BLD AUTO: 0.2 % (ref 0–1.5)
BILIRUB SERPL-MCNC: 0.4 MG/DL (ref 0–1.2)
BUN SERPL-MCNC: 14 MG/DL (ref 8–23)
BUN/CREAT SERPL: 17.9 (ref 7–25)
CALCIUM SPEC-SCNC: 8.6 MG/DL (ref 8.6–10.5)
CHLORIDE SERPL-SCNC: 98 MMOL/L (ref 98–107)
CO2 SERPL-SCNC: 41 MMOL/L (ref 22–29)
CREAT SERPL-MCNC: 0.78 MG/DL (ref 0.57–1)
DEPRECATED RDW RBC AUTO: 55.3 FL (ref 37–54)
EGFRCR SERPLBLD CKD-EPI 2021: 82.3 ML/MIN/1.73
EOSINOPHIL # BLD AUTO: 0 10*3/MM3 (ref 0–0.4)
EOSINOPHIL NFR BLD AUTO: 0 % (ref 0.3–6.2)
ERYTHROCYTE [DISTWIDTH] IN BLOOD BY AUTOMATED COUNT: 14.6 % (ref 12.3–15.4)
GLOBULIN UR ELPH-MCNC: 1.9 GM/DL
GLUCOSE SERPL-MCNC: 84 MG/DL (ref 65–99)
HCT VFR BLD AUTO: 48.3 % (ref 34–46.6)
HGB BLD-MCNC: 14.3 G/DL (ref 12–15.9)
IMM GRANULOCYTES # BLD AUTO: 0.03 10*3/MM3 (ref 0–0.05)
IMM GRANULOCYTES NFR BLD AUTO: 0.4 % (ref 0–0.5)
LYMPHOCYTES # BLD AUTO: 1.96 10*3/MM3 (ref 0.7–3.1)
LYMPHOCYTES NFR BLD AUTO: 23.8 % (ref 19.6–45.3)
MCH RBC QN AUTO: 30.2 PG (ref 26.6–33)
MCHC RBC AUTO-ENTMCNC: 29.6 G/DL (ref 31.5–35.7)
MCV RBC AUTO: 102.1 FL (ref 79–97)
MONOCYTES # BLD AUTO: 0.62 10*3/MM3 (ref 0.1–0.9)
MONOCYTES NFR BLD AUTO: 7.5 % (ref 5–12)
NEUTROPHILS NFR BLD AUTO: 5.61 10*3/MM3 (ref 1.7–7)
NEUTROPHILS NFR BLD AUTO: 68.1 % (ref 42.7–76)
NRBC BLD AUTO-RTO: 0 /100 WBC (ref 0–0.2)
PLATELET # BLD AUTO: 137 10*3/MM3 (ref 140–450)
PMV BLD AUTO: 10.3 FL (ref 6–12)
POTASSIUM SERPL-SCNC: 4.5 MMOL/L (ref 3.5–5.2)
PROT SERPL-MCNC: 5.1 G/DL (ref 6–8.5)
QT INTERVAL: 422 MS
QTC INTERVAL: 438 MS
RBC # BLD AUTO: 4.73 10*6/MM3 (ref 3.77–5.28)
SODIUM SERPL-SCNC: 141 MMOL/L (ref 136–145)
WBC NRBC COR # BLD AUTO: 8.24 10*3/MM3 (ref 3.4–10.8)

## 2024-09-01 PROCEDURE — 97530 THERAPEUTIC ACTIVITIES: CPT

## 2024-09-01 PROCEDURE — 94799 UNLISTED PULMONARY SVC/PX: CPT

## 2024-09-01 PROCEDURE — 25010000002 CEFTRIAXONE PER 250 MG: Performed by: INTERNAL MEDICINE

## 2024-09-01 PROCEDURE — 97165 OT EVAL LOW COMPLEX 30 MIN: CPT

## 2024-09-01 PROCEDURE — 97535 SELF CARE MNGMENT TRAINING: CPT

## 2024-09-01 PROCEDURE — 97161 PT EVAL LOW COMPLEX 20 MIN: CPT

## 2024-09-01 PROCEDURE — 80053 COMPREHEN METABOLIC PANEL: CPT | Performed by: INTERNAL MEDICINE

## 2024-09-01 PROCEDURE — 85025 COMPLETE CBC W/AUTO DIFF WBC: CPT | Performed by: INTERNAL MEDICINE

## 2024-09-01 PROCEDURE — 25010000002 METHYLPREDNISOLONE PER 125 MG: Performed by: INTERNAL MEDICINE

## 2024-09-01 PROCEDURE — 25010000002 ENOXAPARIN PER 10 MG: Performed by: INTERNAL MEDICINE

## 2024-09-01 PROCEDURE — 99232 SBSQ HOSP IP/OBS MODERATE 35: CPT | Performed by: INTERNAL MEDICINE

## 2024-09-01 RX ORDER — PREDNISONE 20 MG/1
40 TABLET ORAL
Status: DISCONTINUED | OUTPATIENT
Start: 2024-09-02 | End: 2024-09-02

## 2024-09-01 RX ADMIN — GABAPENTIN 800 MG: 400 CAPSULE ORAL at 20:37

## 2024-09-01 RX ADMIN — DOXYCYCLINE 100 MG: 100 CAPSULE ORAL at 20:37

## 2024-09-01 RX ADMIN — ALBUTEROL SULFATE 2.5 MG: 2.5 SOLUTION RESPIRATORY (INHALATION) at 05:43

## 2024-09-01 RX ADMIN — FAMOTIDINE 20 MG: 20 TABLET, FILM COATED ORAL at 08:42

## 2024-09-01 RX ADMIN — IPRATROPIUM BROMIDE AND ALBUTEROL SULFATE 3 ML: 2.5; .5 SOLUTION RESPIRATORY (INHALATION) at 20:59

## 2024-09-01 RX ADMIN — SODIUM CHLORIDE 1000 MG: 900 INJECTION INTRAVENOUS at 23:25

## 2024-09-01 RX ADMIN — METHYLPREDNISOLONE SODIUM SUCCINATE 60 MG: 125 INJECTION, POWDER, FOR SOLUTION INTRAMUSCULAR; INTRAVENOUS at 20:37

## 2024-09-01 RX ADMIN — IPRATROPIUM BROMIDE AND ALBUTEROL SULFATE 3 ML: 2.5; .5 SOLUTION RESPIRATORY (INHALATION) at 07:05

## 2024-09-01 RX ADMIN — GABAPENTIN 800 MG: 400 CAPSULE ORAL at 14:41

## 2024-09-01 RX ADMIN — DOXYCYCLINE 100 MG: 100 CAPSULE ORAL at 08:42

## 2024-09-01 RX ADMIN — IPRATROPIUM BROMIDE AND ALBUTEROL SULFATE 3 ML: 2.5; .5 SOLUTION RESPIRATORY (INHALATION) at 16:29

## 2024-09-01 RX ADMIN — GABAPENTIN 800 MG: 400 CAPSULE ORAL at 06:54

## 2024-09-01 RX ADMIN — NICOTINE 1 PATCH: 21 PATCH TRANSDERMAL at 08:46

## 2024-09-01 RX ADMIN — FAMOTIDINE 20 MG: 20 TABLET, FILM COATED ORAL at 17:32

## 2024-09-01 RX ADMIN — IPRATROPIUM BROMIDE AND ALBUTEROL SULFATE 3 ML: 2.5; .5 SOLUTION RESPIRATORY (INHALATION) at 12:48

## 2024-09-01 RX ADMIN — TRAZODONE HYDROCHLORIDE 100 MG: 100 TABLET ORAL at 20:37

## 2024-09-01 RX ADMIN — Medication 10 ML: at 20:37

## 2024-09-01 RX ADMIN — METHYLPREDNISOLONE SODIUM SUCCINATE 60 MG: 125 INJECTION, POWDER, FOR SOLUTION INTRAMUSCULAR; INTRAVENOUS at 08:42

## 2024-09-01 RX ADMIN — ENOXAPARIN SODIUM 40 MG: 100 INJECTION SUBCUTANEOUS at 08:42

## 2024-09-01 RX ADMIN — AMLODIPINE BESYLATE 5 MG: 5 TABLET ORAL at 08:41

## 2024-09-01 NOTE — PLAN OF CARE
Goal Outcome Evaluation:  Plan of Care Reviewed With: patient           Outcome Evaluation: PT eval completed. Pt presents below baseline function d/t generalized weakness, balance deficits, and decreased activity tolerance. Pt ambulated 40 ft, SBA, and then took standing rest break to check SpO2 w/ desat to 85%. After ~1min rest break and cues for PLB, SpO2 increased to 91% and pt ambulated additional 40 ft.. Pt would benefit from skilled IP PT. Recommend home w/ assist and OP pulmonary rehab at d/c.      Anticipated Discharge Disposition (PT): home with assist, home with outpatient therapy services, other (see comments) (OP pulm rehab)

## 2024-09-01 NOTE — PLAN OF CARE
Goal Outcome Evaluation:  Plan of Care Reviewed With: patient        Progress: improving         Problem: Adult Inpatient Plan of Care  Goal: Plan of Care Review  Outcome: Ongoing, Progressing  Flowsheets (Taken 9/1/2024 0257)  Progress: improving  Plan of Care Reviewed With: patient  Goal: Patient-Specific Goal (Individualized)  Outcome: Ongoing, Progressing  Goal: Absence of Hospital-Acquired Illness or Injury  Outcome: Ongoing, Progressing  Intervention: Identify and Manage Fall Risk  Recent Flowsheet Documentation  Taken 9/1/2024 0000 by Khloe Verdugo RN  Safety Promotion/Fall Prevention:   safety round/check completed   nonskid shoes/slippers when out of bed  Taken 8/31/2024 2200 by Khloe Verdugo RN  Safety Promotion/Fall Prevention:   safety round/check completed   nonskid shoes/slippers when out of bed  Taken 8/31/2024 2000 by Khloe Verdugo RN  Safety Promotion/Fall Prevention:   activity supervised   assistive device/personal items within reach   clutter free environment maintained   fall prevention program maintained   nonskid shoes/slippers when out of bed   room organization consistent   safety round/check completed  Intervention: Prevent Skin Injury  Recent Flowsheet Documentation  Taken 9/1/2024 0000 by Khloe Verdugo RN  Body Position: position changed independently  Skin Protection:   adhesive use limited   incontinence pads utilized  Taken 8/31/2024 2200 by Khloe Verdugo RN  Body Position: position changed independently  Skin Protection:   adhesive use limited   incontinence pads utilized  Taken 8/31/2024 2000 by Khloe Verdugo RN  Body Position: position changed independently  Skin Protection:   adhesive use limited   tubing/devices free from skin contact   transparent dressing maintained   skin-to-skin areas padded  Intervention: Prevent and Manage VTE (Venous Thromboembolism) Risk  Recent Flowsheet Documentation  Taken 9/1/2024 0000 by Khloe Verdugo RN  Activity Management: activity  minimized  Taken 8/31/2024 2200 by Khloe Verdugo RN  Activity Management: activity minimized  Taken 8/31/2024 2000 by Khloe Verdugo RN  Activity Management: activity encouraged  Intervention: Prevent Infection  Recent Flowsheet Documentation  Taken 9/1/2024 0000 by Khloe Verdugo RN  Infection Prevention:   cohorting utilized   environmental surveillance performed   equipment surfaces disinfected   hand hygiene promoted   personal protective equipment utilized   rest/sleep promoted   single patient room provided   visitors restricted/screened  Taken 8/31/2024 2200 by Khloe Verdugo RN  Infection Prevention:   cohorting utilized   environmental surveillance performed   equipment surfaces disinfected   hand hygiene promoted   personal protective equipment utilized   rest/sleep promoted   single patient room provided   visitors restricted/screened  Taken 8/31/2024 2000 by Khloe Verdugo RN  Infection Prevention: cohorting utilized  Goal: Optimal Comfort and Wellbeing  Outcome: Ongoing, Progressing  Intervention: Provide Person-Centered Care  Recent Flowsheet Documentation  Taken 8/31/2024 2000 by Khloe Verdugo RN  Trust Relationship/Rapport:   care explained   choices provided   emotional support provided   empathic listening provided   questions answered   questions encouraged   reassurance provided   thoughts/feelings acknowledged  Goal: Readiness for Transition of Care  Outcome: Ongoing, Progressing     Problem: COPD (Chronic Obstructive Pulmonary Disease) Comorbidity  Goal: Maintenance of COPD Symptom Control  Outcome: Ongoing, Progressing  Intervention: Maintain COPD-Symptom Control  Recent Flowsheet Documentation  Taken 8/31/2024 2000 by Khloe Verdugo RN  Medication Review/Management: medications reviewed     Problem: Skin Injury Risk Increased  Goal: Skin Health and Integrity  Outcome: Ongoing, Progressing  Intervention: Optimize Skin Protection  Recent Flowsheet Documentation  Taken 9/1/2024 0000 by Kartik  FABIOLA Ledbetter  Pressure Reduction Techniques:   frequent weight shift encouraged   heels elevated off bed  Head of Bed (HOB) Positioning: HOB elevated  Pressure Reduction Devices:   specialty bed utilized   pressure-redistributing mattress utilized   positioning supports utilized  Skin Protection:   adhesive use limited   incontinence pads utilized  Taken 8/31/2024 2200 by Khloe Verdugo RN  Pressure Reduction Techniques:   frequent weight shift encouraged   heels elevated off bed   positioned off wounds   pressure points protected  Head of Bed (HOB) Positioning: HOB elevated  Pressure Reduction Devices:   specialty bed utilized   pressure-redistributing mattress utilized   positioning supports utilized  Skin Protection:   adhesive use limited   incontinence pads utilized  Taken 8/31/2024 2000 by Khloe Verdugo RN  Pressure Reduction Techniques:   frequent weight shift encouraged   heels elevated off bed  Head of Bed (HOB) Positioning: [facility] elevated  Pressure Reduction Devices:   specialty bed utilized   pressure-redistributing mattress utilized   positioning supports utilized  Skin Protection:   adhesive use limited   tubing/devices free from skin contact   transparent dressing maintained   skin-to-skin areas padded     Problem: Fall Injury Risk  Goal: Absence of Fall and Fall-Related Injury  Outcome: Ongoing, Progressing  Intervention: Identify and Manage Contributors  Recent Flowsheet Documentation  Taken 9/1/2024 0000 by Khloe Verdugo RN  Self-Care Promotion:   independence encouraged   BADL personal routines maintained   BADL personal objects within reach  Taken 8/31/2024 2200 by Khloe Verdugo RN  Self-Care Promotion:   BADL personal routines maintained   BADL personal objects within reach   independence encouraged  Taken 8/31/2024 2000 by Khloe Verdugo RN  Medication Review/Management: medications reviewed  Self-Care Promotion:   BADL personal routines maintained   BADL personal objects within reach    independence encouraged  Intervention: Promote Injury-Free Environment  Recent Flowsheet Documentation  Taken 9/1/2024 0000 by Khloe Verdugo, RN  Safety Promotion/Fall Prevention:   safety round/check completed   nonskid shoes/slippers when out of bed  Taken 8/31/2024 2200 by Khloe Verdugo, RN  Safety Promotion/Fall Prevention:   safety round/check completed   nonskid shoes/slippers when out of bed  Taken 8/31/2024 2000 by Khloe Verdugo, RN  Safety Promotion/Fall Prevention:   activity supervised   assistive device/personal items within reach   clutter free environment maintained   fall prevention program maintained   nonskid shoes/slippers when out of bed   room organization consistent   safety round/check completed

## 2024-09-01 NOTE — THERAPY EVALUATION
Patient Name: Elvi Keita  : 1955    MRN: [id number]                              Today's Date: 2024       Admit Date: 2024    Visit Dx:     ICD-10-CM ICD-9-CM   1. COPD exacerbation  J44.1 491.21   2. Acute respiratory failure with hypoxia  J96.01 518.81     Patient Active Problem List   Diagnosis    COPD with exacerbation    Chronic hypoxemic respiratory failure    Cor pulmonale    Generalized osteoarthritis    Hyperlipidemia    Lumbar spondylosis    Nicotine dependence    Essential hypertension    Acute and chronic respiratory failure with hypoxia     Past Medical History:   Diagnosis Date    COPD (chronic obstructive pulmonary disease)     Hypertension      Past Surgical History:   Procedure Laterality Date    LAPAROSCOPIC TUBAL LIGATION      TOE SURGERY        General Information       [facility] Name 24 1550          Physical Therapy Time and Intention    Document Type evaluation  -     Mode of Treatment physical therapy  -       Row Name 24 4270          General Information    Patient Profile Reviewed yes  -     Prior Level of Function independent:;all household mobility;gait;transfer;bed mobility;ADL's;driving  No AD use at baseline. Limited community ambulator, dtr picks up groceries. Denies acute falls. 4L O2 at baseline - pt reports only wearing O2 at night  -     Existing Precautions/Restrictions fall;oxygen therapy device and L/min  -     Barriers to Rehab medically complex  -       Row Name 24 2383          Living Environment    People in Home spouse;grandchild(fani);other (see comments)  15 y/o granddaughter  -       Row Name 24 1559          Home Main Entrance    Number of Stairs, Main Entrance seven  -     Stair Railings, Main Entrance railing on right side (ascending)  -       Row Name 24 2096          Stairs Within Home, Primary    Number of Stairs, Within Home, Primary none  -       Row Name 24 2492          Cognition    Orientation  Status (Cognition) oriented x 4  -[facility] Name 09/01/24 1553          Safety Issues, Functional Mobility    Safety Issues Affecting Function (Mobility) awareness of need for assistance;insight into deficits/self-awareness;safety precaution awareness;safety precautions follow-through/compliance;sequencing abilities  -     Impairments Affecting Function (Mobility) balance;endurance/activity tolerance;shortness of breath;strength  -               User Key  (r) = Recorded By, (t) = Taken By, (c) = Cosigned By      Initials Name Provider Type     Valencia Varma, RETA Physical Therapist                   Mobility       [facility] Name 09/01/24 1557          Bed Mobility    Bed Mobility supine-sit;sit-supine  -     Supine-Sit [place] (Bed Mobility) modified independence  -     Sit-Supine [place] (Bed Mobility) modified independence  -     Assistive Device (Bed Mobility) head of bed elevated  -     Comment, (Bed Mobility) SpO2 desat to 86% from supine>sit on 3L and required ~2 mins and increase to 4L for SpO2 >90%  -LH       Row Name 09/01/24 1557          Transfers    Comment, (Transfers) SpO2 91% in standing prior to ambulation on 4L O2  -LH       Row Name 09/01/24 1557          Sit-Stand Transfer    Sit-Stand [place] (Transfers) standby assist  -     Comment, (Sit-Stand Transfer) No AD used  -LH       Row Name 09/01/24 1557          Gait/Stairs (Locomotion)    [place] Level (Gait) standby assist;verbal cues  -     Patient was able to Ambulate yes  -     Distance in Feet (Gait) 40  +40  -     Deviations/Abnormal Patterns (Gait) bilateral deviations;alexis decreased;stride length decreased  -     Bilateral Gait Deviations forward flexed posture  -     Comment, (Gait/Stairs) Pt amb 40 ft and then took standing rest break to check SpO2 w/ desat to 85%. After ~1min rest break and cues for PLB, SpO2 increased to 91% and pt amb additional 40 ft. Demo step through gait pattern w/  decreased alexis. Cues for PLB. No LOB. Distance limited by fatigue  -               User Key  (r) = Recorded By, (t) = Taken By, (c) = Cosigned By      Initials Name Provider Type     Valencia Varma PT Physical Therapist                   Obj/Interventions       [facility] Name 09/01/24 1602          Range of Motion Comprehensive    General Range of Motion bilateral lower extremity ROM WFL  -LH       Row Name 09/01/24 1602          Strength Comprehensive (MMT)    General Manual Muscle Testing (MMT) Assessment lower extremity strength deficits identified  -     Comment, General Manual Muscle Testing (MMT) Assessment BLEs grossly 4+/5  -[facility] Name 09/01/24 1602          Motor Skills    Motor Skills functional endurance  -     Functional Endurance poor functional endurance  -LH       Row Name 09/01/24 1602          Balance    Balance Assessment sitting static balance;sitting dynamic balance;sit to stand dynamic balance;standing dynamic balance;standing static balance  -     Static Sitting Balance standby assist  -     Dynamic Sitting Balance standby assist  -     Position, Sitting Balance unsupported  -     Sit to Stand Dynamic Balance standby assist  -     Static Standing Balance standby assist  -     Dynamic Standing Balance standby assist  -     Position/Device Used, Standing Balance unsupported  -     Balance Interventions sitting;standing;sit to stand;supported;static;dynamic  -[facility] Name 09/01/24 1602          Sensory Assessment (Somatosensory)    Sensory Assessment (Somatosensory) LE sensation intact  -               User Key  (r) = Recorded By, (t) = Taken By, (c) = Cosigned By      Initials Name Provider Type     Valencia Varma PT Physical Therapist                   Goals/Plan    No documentation.                  Clinical Impression       Row Name 09/01/24 1603          Pain    Pretreatment Pain Rating 0/10 - no pain  -     Posttreatment Pain Rating 0/10 - no pain   -[facility] Name 09/01/24 1603          Plan of Care Review    Plan of Care Reviewed With patient  -     Outcome Evaluation PT eval completed. Pt presents below baseline function d/t generalized weakness, balance deficits, and decreased activity tolerance. Pt ambulated 40 ft, SBA, and then took standing rest break to check SpO2 w/ desat to 85%. After ~1min rest break and cues for PLB, SpO2 increased to 91% and pt ambulated additional 40 ft.. Pt would benefit from skilled IP PT. Recommend home w/ assist and OP pulmonary rehab at d/c.  -[facility] Name 09/01/24 1603          Therapy Assessment/Plan (PT)    Patient/Family Therapy Goals Statement (PT) to go home  -     Rehab Potential (PT) good, to achieve stated therapy goals  -     Criteria for Skilled Interventions Met (PT) yes;meets criteria;skilled treatment is necessary  [facility]     Therapy Frequency (PT) daily  -     Predicted Duration of Therapy Intervention (PT) 5 days  -[facility] Name 09/01/24 1603          Vital Signs    Pre Systolic BP Rehab 147  -LH     Pre Treatment Diastolic BP 58  -LH     Pre SpO2 (%) 92  3L  -LH     O2 Delivery Pre Treatment nasal cannula  -LH     Intra SpO2 (%) 85   4L  -LH     O2 Delivery Intra Treatment nasal cannula  -LH     Post SpO2 (%) 92  3L  -LH     O2 Delivery Post Treatment nasal cannula  -LH     Pre Patient Position Supine  -     Intra Patient Position Standing  -     Post Patient Position Supine  -[facility] Name 09/01/24 1603          Positioning and Restraints    Pre-Treatment Position in bed  -     Post Treatment Position bed  -LH     In Bed notified nsg;supine;call light within reach;encouraged to call for assist;exit alarm on  -               User Key  (r) = Recorded By, (t) = Taken By, (c) = Cosigned By      Initials Name Provider Type     Valencia Varma, PT Physical Therapist                   Outcome Measures       [facility] Name 09/01/24 0914 09/01/24 0800       How much help from another person  do you currently need...    Turning from your back to your side while in flat bed without using bedrails? 4  - 4  -OP    Moving from lying on back to sitting on the side of a flat bed without bedrails? 4  - 4  -OP    Moving to and from a bed to a chair (including a wheelchair)? 3  - 4  -OP    Standing up from a chair using your arms (e.g., wheelchair, bedside chair)? 4  - 4  -OP    Climbing 3-5 steps with a railing? 3  - 3  -OP    To walk in hospital room? 3  - 4  -OP    AM-PAC 6 Clicks Score (PT) 21  - 23  -OP    Highest Level of Mobility Goal 6 --> Walk 10 steps or more  - 7 --> Walk 25 feet or more  -OP      Row Name 09/01/24 0914          Functional Assessment    Outcome Measure Options AM-PAC 6 Clicks Daily Activity (OT)  -               User Key  (r) = Recorded By, (t) = Taken By, (c) = Cosigned By      Initials Name Provider Type    SW Halima Quinones OT Occupational Therapist     Valencia Varma, PT Physical Therapist    OP Zakia Obando, RN Registered Nurse                                 Physical Therapy Education       Title: PT OT SLP Therapies (In Progress)       Topic: Physical Therapy (In Progress)       Point: Mobility training (Done)       Learning Progress Summary             Patient Acceptance, E, VU,NR by  at 9/1/2024 1606                         Point: Home exercise program (Not Started)       Learner Progress:  Not documented in this visit.              Point: Body mechanics (Done)       Learning Progress Summary             Patient Acceptance, E, VU,NR by  at 9/1/2024 1606                         Point: Precautions (Done)       Learning Progress Summary             Patient Acceptance, E, VU,NR by  at 9/1/2024 1606                                         User Key       Initials Effective Dates Name Provider Type Discipline     09/21/23 -  Valencia Varma, PT Physical Therapist PT                  PT Recommendation and Plan     Plan of Care Reviewed With:  patient  Outcome Evaluation: PT eval completed. Pt presents below baseline function d/t generalized weakness, balance deficits, and decreased activity tolerance. Pt ambulated 40 ft, SBA, and then took standing rest break to check SpO2 w/ desat to 85%. After ~1min rest break and cues for PLB, SpO2 increased to 91% and pt ambulated additional 40 ft.. Pt would benefit from skilled IP PT. Recommend home w/ assist and OP pulmonary rehab at d/c.     Time Calculation:   PT Evaluation Complexity  History, PT Evaluation Complexity: 1-2 personal factors and/or comorbidities  Examination of Body Systems (PT Eval Complexity): total of 3 or more elements  Clinical Presentation (PT Evaluation Complexity): stable  Clinical Decision Making (PT Evaluation Complexity): low complexity  Overall Complexity (PT Evaluation Complexity): low complexity     PT Charges       Row Name 09/01/24 1607             Time Calculation    Start Time 1528  -LH      PT Received On 09/01/24  -      PT Goal Re-Cert Due Date 09/11/24  -         Timed Charges    74510 - PT Therapeutic Activity Minutes 8  -LH         Untimed Charges    PT Eval/Re-eval Minutes 35  -LH         Total Minutes    Timed Charges Total Minutes 8  -LH      Untimed Charges Total Minutes 35  -LH       Total Minutes 43  -LH                User Key  (r) = Recorded By, (t) = Taken By, (c) = Cosigned By      Initials Name Provider Type     Valencia Varma, PT Physical Therapist                  Therapy Charges for Today       Code Description Service Date Service Provider Modifiers Qty    15150692197  PT THERAPEUTIC ACT EA 15 MIN 9/1/2024 Valencia Varma, PT GP 1    72297194334 HC PT EVAL LOW COMPLEXITY 3 9/1/2024 Valencia Varma, PT GP 1            PT G-Codes  Outcome Measure Options: AM-PAC 6 Clicks Daily Activity (OT)  AM-PAC 6 Clicks Score (PT): 21  AM-PAC 6 Clicks Score (OT): 21  PT Discharge Summary  Anticipated Discharge Disposition (PT): home with assist, home with  outpatient therapy services, other (see comments) (OP pulm rehab)    Valencia Varma, PT  9/1/2024

## 2024-09-01 NOTE — PLAN OF CARE
Problem: Adult Inpatient Plan of Care  Goal: Plan of Care Review  Outcome: Ongoing, Not Progressing  Goal: Patient-Specific Goal (Individualized)  Outcome: Ongoing, Not Progressing  Goal: Absence of Hospital-Acquired Illness or Injury  Outcome: Ongoing, Not Progressing  Intervention: Identify and Manage Fall Risk  Recent Flowsheet Documentation  Taken 9/1/2024 1800 by Zakia Obando RN  Safety Promotion/Fall Prevention:   activity supervised   safety round/check completed  Taken 9/1/2024 1600 by Zakia Obando RN  Safety Promotion/Fall Prevention:   activity supervised   safety round/check completed  Taken 9/1/2024 1400 by Zakia Obando RN  Safety Promotion/Fall Prevention:   activity supervised   safety round/check completed  Taken 9/1/2024 1200 by Zakia Obando RN  Safety Promotion/Fall Prevention:   safety round/check completed   activity supervised  Taken 9/1/2024 1000 by Zakia Obando RN  Safety Promotion/Fall Prevention: safety round/check completed  Taken 9/1/2024 0800 by Zakia Obando RN  Safety Promotion/Fall Prevention: safety round/check completed  Intervention: Prevent Skin Injury  Recent Flowsheet Documentation  Taken 9/1/2024 1800 by Zakia Obando RN  Body Position: position changed independently  Skin Protection:   adhesive use limited   tubing/devices free from skin contact   transparent dressing maintained  Taken 9/1/2024 1600 by Zakia Obando RN  Body Position: position changed independently  Skin Protection:   adhesive use limited   transparent dressing maintained   tubing/devices free from skin contact  Taken 9/1/2024 1400 by Zakia Obando RN  Body Position: position changed independently  Skin Protection:   adhesive use limited   transparent dressing maintained   tubing/devices free from skin contact  Taken 9/1/2024 1200 by Zakia Obando RN  Body Position: position changed independently  Skin Protection:   adhesive use limited   tubing/devices free from skin contact   transparent  dressing maintained  Taken 9/1/2024 1000 by Zakia Obando RN  Skin Protection:   adhesive use limited   tubing/devices free from skin contact   transparent dressing maintained  Taken 9/1/2024 0800 by Zakia Obando RN  Body Position: position changed independently  Skin Protection:   adhesive use limited   tubing/devices free from skin contact   transparent dressing maintained  Intervention: Prevent and Manage VTE (Venous Thromboembolism) Risk  Recent Flowsheet Documentation  Taken 9/1/2024 1800 by Zakia Obando RN  Activity Management: activity encouraged  Taken 9/1/2024 1600 by Zakia Obando RN  Activity Management: activity encouraged  Taken 9/1/2024 1400 by Zakia Obando RN  Activity Management: up in chair  Taken 9/1/2024 1200 by Zakia Obando RN  Activity Management: up in chair  Taken 9/1/2024 1000 by Zakia Obando RN  Activity Management: up in chair  Taken 9/1/2024 0800 by Zakia Obando RN  Activity Management: activity encouraged  Goal: Optimal Comfort and Wellbeing  Outcome: Ongoing, Not Progressing  Goal: Readiness for Transition of Care  Outcome: Ongoing, Not Progressing     Problem: COPD (Chronic Obstructive Pulmonary Disease) Comorbidity  Goal: Maintenance of COPD Symptom Control  Outcome: Ongoing, Not Progressing     Problem: Skin Injury Risk Increased  Goal: Skin Health and Integrity  Outcome: Ongoing, Not Progressing  Intervention: Optimize Skin Protection  Recent Flowsheet Documentation  Taken 9/1/2024 1800 by Zakia Obando RN  Pressure Reduction Techniques: frequent weight shift encouraged  Pressure Reduction Devices: pressure-redistributing mattress utilized  Skin Protection:   adhesive use limited   tubing/devices free from skin contact   transparent dressing maintained  Taken 9/1/2024 1600 by Zakia Obando RN  Pressure Reduction Techniques: frequent weight shift encouraged  Pressure Reduction Devices: pressure-redistributing mattress utilized  Skin Protection:   adhesive use limited    transparent dressing maintained   tubing/devices free from skin contact  Taken 9/1/2024 1400 by Zakia Obando RN  Pressure Reduction Techniques: frequent weight shift encouraged  Pressure Reduction Devices: pressure-redistributing mattress utilized  Skin Protection:   adhesive use limited   transparent dressing maintained   tubing/devices free from skin contact  Taken 9/1/2024 1200 by Zakia Obando RN  Pressure Reduction Techniques: frequent weight shift encouraged  Pressure Reduction Devices: pressure-redistributing mattress utilized  Skin Protection:   adhesive use limited   tubing/devices free from skin contact   transparent dressing maintained  Taken 9/1/2024 1000 by Zakia Obando RN  Pressure Reduction Techniques: frequent weight shift encouraged  Pressure Reduction Devices: pressure-redistributing mattress utilized  Skin Protection:   adhesive use limited   tubing/devices free from skin contact   transparent dressing maintained  Taken 9/1/2024 0800 by Zakia Obando RN  Pressure Reduction Techniques: frequent weight shift encouraged  Pressure Reduction Devices: pressure-redistributing mattress utilized  Skin Protection:   adhesive use limited   tubing/devices free from skin contact   transparent dressing maintained     Problem: Fall Injury Risk  Goal: Absence of Fall and Fall-Related Injury  Outcome: Ongoing, Not Progressing  Intervention: Promote Injury-Free Environment  Recent Flowsheet Documentation  Taken 9/1/2024 1800 by Zakia Obando RN  Safety Promotion/Fall Prevention:   activity supervised   safety round/check completed  Taken 9/1/2024 1600 by Zakia Obando RN  Safety Promotion/Fall Prevention:   activity supervised   safety round/check completed  Taken 9/1/2024 1400 by Zakia Obando RN  Safety Promotion/Fall Prevention:   activity supervised   safety round/check completed  Taken 9/1/2024 1200 by Zakia Obando RN  Safety Promotion/Fall Prevention:   safety round/check completed   activity  supervised  Taken 9/1/2024 1000 by Zakia Obando, RN  Safety Promotion/Fall Prevention: safety round/check completed  Taken 9/1/2024 0800 by Zakia Obando, RN  Safety Promotion/Fall Prevention: safety round/check completed   Goal Outcome Evaluation:

## 2024-09-01 NOTE — THERAPY EVALUATION
Patient Name: Elvi Keita  : 1955    MRN: [id number]                              Today's Date: 2024       Admit Date: 2024    Visit Dx:     ICD-10-CM ICD-9-CM   1. COPD exacerbation  J44.1 491.21   2. Acute respiratory failure with hypoxia  J96.01 518.81     Patient Active Problem List   Diagnosis    COPD with exacerbation    Chronic hypoxemic respiratory failure    Cor pulmonale    Generalized osteoarthritis    Hyperlipidemia    Lumbar spondylosis    Nicotine dependence    Essential hypertension    Acute and chronic respiratory failure with hypoxia     Past Medical History:   Diagnosis Date    COPD (chronic obstructive pulmonary disease)     Hypertension      Past Surgical History:   Procedure Laterality Date    LAPAROSCOPIC TUBAL LIGATION      TOE SURGERY        General Information       Row Name 24          OT Time and Intention    Document Type evaluation  -     Mode of Treatment occupational therapy  -       Row Name 24          General Information    Patient Profile Reviewed yes  -SW     Prior Level of Function independent:;all household mobility;ADL's;driving  -     Existing Precautions/Restrictions fall;oxygen therapy device and L/min  -     Barriers to Rehab medically complex  -       Row Name 24          Occupational Profile    Environmental Supports and Barriers (Occupational Profile) Pt states she doesn't use ae. She has a tub shower with a seat.  -       Row Name 24          Living Environment    People in Home spouse;grandchild(fani)  -       Row Name 24          Home Main Entrance    Number of Stairs, Main Entrance seven  -       Row Name 24 08          Stairs Within Home, Primary    Number of Stairs, Within Home, Primary none  -       Row Name 24          Cognition    Orientation Status (Cognition) oriented x 4  -       Row Name 24          Safety Issues, Functional Mobility     Safety Issues Affecting Function (Mobility) safety precautions follow-through/compliance;safety precaution awareness;insight into deficits/self-awareness  -     Impairments Affecting Function (Mobility) balance;endurance/activity tolerance;shortness of breath;strength  -               User Key  (r) = Recorded By, (t) = Taken By, (c) = Cosigned By      Initials Name Provider Type     Halima Quinones OT Occupational Therapist                     Mobility/ADL's       Row Name 09/01/24 0812          Bed Mobility    Bed Mobility supine-sit  -SW     Supine-Sit [place] (Bed Mobility) set up  -     Assistive Device (Bed Mobility) bed rails;head of bed elevated  -       Row Name 09/01/24 0812          Transfers    Transfers bed-chair transfer;sit-stand transfer  -[facility] Name 09/01/24 0812          Bed-Chair Transfer    Bed-Chair [place] (Transfers) contact guard;verbal cues  -     Comment, (Bed-Chair Transfer) no device  -       Row Name 09/01/24 0812          Sit-Stand Transfer    Sit-Stand [place] (Transfers) standby assist  -[facility] Name 09/01/24 0812          Functional Mobility    Functional Mobility- Ind. Level contact guard assist  -     Functional Mobility-Distance (Feet) 5  -SW     Functional Mobility- Safety Issues supplemental O2  -SW     Functional Mobility- Comment O2 sat dropped to 85% while taking steps in room  -       Row Name 09/01/24 0812          Activities of Daily Living    BADL Assessment/Intervention lower body dressing;grooming  -[facility] Name 09/01/24 0812          Lower Body Dressing Assessment/Training    [place] Level (Lower Body Dressing) lower body dressing skills;socks;set up  -SW     Position (Lower Body Dressing) edge of bed sitting  -       Row Name 09/01/24 0812          Grooming Assessment/Training    [place] Level (Grooming) grooming skills;wash face, hands;set up  -SW     Position (Grooming) edge of bed sitting  -                User Key  (r) = Recorded By, (t) = Taken By, (c) = Cosigned By      Initials Name Provider Type    Halima Houston OT Occupational Therapist                   Obj/Interventions       [facility] Name 09/01/24 0812          Sensory Assessment (Somatosensory)    Sensory Assessment (Somatosensory) UE sensation intact  -SW       Row Name 09/01/24 0812          Vision Assessment/Intervention    Visual Impairment/Limitations WFL;corrective lenses for reading  -SW       Row Name 09/01/24 0812          Range of Motion Comprehensive    General Range of Motion bilateral upper extremity ROM WFL  -SW       Row Name 09/01/24 0812          Strength Comprehensive (MMT)    General Manual Muscle Testing (MMT) Assessment upper extremity strength deficits identified  -     Comment, General Manual Muscle Testing (MMT) Assessment BUEs 4+/5  -SW       Row Name 09/01/24 0812          Balance    Balance Assessment sitting static balance;sitting dynamic balance;sit to stand dynamic balance;standing static balance;standing dynamic balance  -     Static Sitting Balance standby assist  -     Dynamic Sitting Balance standby assist  -     Position, Sitting Balance unsupported  -SW     Sit to Stand Dynamic Balance standby assist  -     Static Standing Balance standby assist  -     Dynamic Standing Balance contact guard  -     Position/Device Used, Standing Balance unsupported  -     Balance Interventions sitting;standing;sit to stand;supported;static;dynamic;minimal challenge;occupation based/functional task  -               User Key  (r) = Recorded By, (t) = Taken By, (c) = Cosigned By      Initials Name Provider Type    Halima Houston OT Occupational Therapist                   Goals/Plan       Row Name 09/01/24 0812          Transfer Goal 1 (OT)    Activity/Assistive Device (Transfer Goal 1, OT) toilet  -     [place] Level/Cues Needed (Transfer Goal 1, OT) standby assist  -     Time Frame (Transfer Goal 1, OT) short term  goal (STG);by discharge  -[facility] Name 09/01/24 0812          Dressing Goal 1 (OT)    Activity/Device (Dressing Goal 1, OT) dressing skills, all  -     [place]/Cues Needed (Dressing Goal 1, OT) independent  -SW     Time Frame (Dressing Goal 1, OT) long term goal (LTG);by discharge  -     Progress/Outcome (Dressing Goal 1, OT) goal ongoing  -SW       Row Name 09/01/24 0812          Therapy Assessment/Plan (OT)    Planned Therapy Interventions (OT) activity tolerance training;adaptive equipment training;BADL retraining;functional balance retraining;patient/caregiver education/training;transfer/mobility retraining;strengthening exercise  -               User Key  (r) = Recorded By, (t) = Taken By, (c) = Cosigned By      Initials Name Provider Type    Halima Houston OT Occupational Therapist                   Clinical Impression       [facility] Name 09/01/24 0812          Pain Assessment    Pretreatment Pain Rating 0/10 - no pain  -     Posttreatment Pain Rating 0/10 - no pain  -SW       Row Name 09/01/24 0812          Plan of Care Review    Plan of Care Reviewed With patient  -SW     Outcome Evaluation OT eval complete. Pt presents with soa and decreased act richie. Pt completed bed mob with sba, sts with sba, cga for t/f and taking steps to chair. Pt did not use ae, however O2 sat dropped to 85% on 4L. Recommend IPOT and Outpt rehab at d/c.  -[facility] Name 09/01/24 0812          Therapy Assessment/Plan (OT)    Rehab Potential (OT) good, to achieve stated therapy goals  -     Criteria for Skilled Therapeutic Interventions Met (OT) yes;meets criteria;skilled treatment is necessary  -     Therapy Frequency (OT) daily  -[facility] Name 09/01/24 0812          Therapy Plan Review/Discharge Plan (OT)    Anticipated Discharge Disposition (OT) home;home with outpatient therapy services  -SW       Row Name 09/01/24 0812          Vital Signs    Pre Systolic BP Rehab 144  -     Pre Treatment Diastolic BP 60   -SW     Post Systolic BP Rehab 129  -SW     Post Treatment Diastolic BP 54  -SW     Pretreatment Heart Rate (beats/min) 80  -SW     Pre SpO2 (%) 92  -SW     O2 Delivery Pre Treatment supplemental O2  -SW     Intra SpO2 (%) 95  -SW     O2 Delivery Intra Treatment supplemental O2  -SW     Post SpO2 (%) 92  -SW     O2 Delivery Post Treatment supplemental O2  -SW     Pre Patient Position Supine  -SW     Intra Patient Position Standing  -SW     Post Patient Position Sitting  -SW       Row Name 09/01/24 0812          Positioning and Restraints    Pre-Treatment Position in bed  -SW     Post Treatment Position chair  -SW     In Chair notified nsg;reclined;sitting;call light within reach;encouraged to call for assist;exit alarm on;waffle cushion;legs elevated;with nsg  -SW               User Key  (r) = Recorded By, (t) = Taken By, (c) = Cosigned By      Initials Name Provider Type    Halima Houston OT Occupational Therapist                   Outcome Measures       Row Name 09/01/24 0914          How much help from another is currently needed...    Putting on and taking off regular lower body clothing? 4  -SW     Bathing (including washing, rinsing, and drying) 3  -SW     Toileting (which includes using toilet bed pan or urinal) 3  -SW     Putting on and taking off regular upper body clothing 3  -SW     Taking care of personal grooming (such as brushing teeth) 4  -SW     Eating meals 4  -SW     AM-PAC 6 Clicks Score (OT) 21  -SW       Row Name 09/01/24 0914          Functional Assessment    Outcome Measure Options AM-PAC 6 Clicks Daily Activity (OT)  -SW               User Key  (r) = Recorded By, (t) = Taken By, (c) = Cosigned By      Initials Name Provider Type    Halima Houston OT Occupational Therapist                    Occupational Therapy Education       Title: PT OT SLP Therapies (In Progress)       Topic: Occupational Therapy (In Progress)       Point: ADL training (Done)       Description:   Instruct learner(s) on  proper safety adaptation and remediation techniques during self care or transfers.   Instruct in proper use of assistive devices.                  Learning Progress Summary             Patient Acceptance, E, VU by  at 9/1/2024 0915                         Point: Home exercise program (Done)       Description:   Instruct learner(s) on appropriate technique for monitoring, assisting and/or progressing therapeutic exercises/activities.                  Learning Progress Summary             Patient Acceptance, E, VU by  at 9/1/2024 0915                         Point: Precautions (Done)       Description:   Instruct learner(s) on prescribed precautions during self-care and functional transfers.                  Learning Progress Summary             Patient Acceptance, E, VU by  at 9/1/2024 0915                         Point: Body mechanics (Not Started)       Description:   Instruct learner(s) on proper positioning and spine alignment during self-care, functional mobility activities and/or exercises.                  Learner Progress:  Not documented in this visit.                              User Key       Initials Effective Dates Name Provider Type Discipline     06/16/21 -  Halima Quinones OT Occupational Therapist OT                  OT Recommendation and Plan  Planned Therapy Interventions (OT): activity tolerance training, adaptive equipment training, BADL retraining, functional balance retraining, patient/caregiver education/training, transfer/mobility retraining, strengthening exercise  Therapy Frequency (OT): daily  Plan of Care Review  Plan of Care Reviewed With: patient  Outcome Evaluation: OT eval complete. Pt presents with soa and decreased act richie. Pt completed bed mob with sba, sts with sba, cga for t/f and taking steps to chair. Pt did not use ae, however O2 sat dropped to 85% on 4L. Recommend IPOT and Outpt rehab at d/c.     Time Calculation:   Evaluation Complexity (OT)  Review Occupational  Profile/Medical/Therapy History Complexity: brief/low complexity  Assessment, Occupational Performance/Identification of Deficit Complexity: 1-3 performance deficits  Clinical Decision Making Complexity (OT): problem focused assessment/low complexity  Overall Complexity of Evaluation (OT): low complexity     Time Calculation- OT       Row Name 09/01/24 0812             Time Calculation- OT    OT Start Time 0812  -SW      OT Received On 09/01/24  -SW      OT Goal Re-Cert Due Date 09/11/24  -SW         Timed Charges    26426 - OT Self Care/Mgmt Minutes 15  -SW         Untimed Charges    OT Eval/Re-eval Minutes 40  -SW         Total Minutes    Timed Charges Total Minutes 15  -SW      Untimed Charges Total Minutes 40  -SW       Total Minutes 55  -SW                User Key  (r) = Recorded By, (t) = Taken By, (c) = Cosigned By      Initials Name Provider Type    SW Halima Quinones OT Occupational Therapist                  Therapy Charges for Today       Code Description Service Date Service Provider Modifiers Qty    10540595062 HC OT SELF CARE/MGMT/TRAIN EA 15 MIN 9/1/2024 Halima Quinones OT GO 1    91552858051 HC OT EVAL LOW COMPLEXITY 3 9/1/2024 Halima Quinones OT GO 1                 Halima Quinones OT  9/1/2024

## 2024-09-01 NOTE — PROGRESS NOTES
[facility] Medicine Services  INPATIENT PROGRESS NOTE    Date of Admission: 8/30/2024  Primary Care Physician: Maria Alejandra West APRN    Subjective     Chief Complaint: shortness of breath    HPI:Patient feels better, wants to go home, but sats drop to the 50-70s with exertion    Review Of Systems:   Patient denies headaches, fever, chills,  chest pain, abdominal pain, nausea or vomiting, diarrhea, rash, itching or bleeding      Objective      Vitals:  Temp:  [97.7 °F (36.5 °C)-98.9 °F (37.2 °C)] 98.9 °F (37.2 °C)  Heart Rate:  [64-79] 79  Resp:  [16-20] 18  BP: (114-168)/(47-74) 143/54  Flow (L/min):  [2-3] 3    Patient is alert and talkative short of breath at rest, sitting up on the edge of bed with family  Neck is without mass or JVD  Heart is Reg wo murmur  Lungs diffuse wheezing, improved air movement  Abd is soft without HSM or mass, not distended or tender to palpation  MAEW, no clubbing cyanosis or edema  Skin is without rash  Neurologic exam is nonfocal   Mood is appropriate, agitated      Results Review:    I have reviewed the labs, radiology results and diagnostic studies.    Results from last 7 days   Lab Units 09/01/24  0455 08/30/24  0944   WBC 10*3/mm3 8.24 8.34   HEMOGLOBIN g/dL 14.3 14.9   HEMATOCRIT % 48.3* 49.6*   PLATELETS 10*3/mm3 137* 181     Results from last 7 days   Lab Units 09/01/24  0456 08/30/24  0944   SODIUM mmol/L 141 135*   POTASSIUM mmol/L 4.5 4.5   CHLORIDE mmol/L 98 93*   CO2 mmol/L 41.0* 36.0*   BUN mg/dL 14 11   CREATININE mg/dL 0.78 1.12*   GLUCOSE mg/dL 84 109*   CALCIUM mg/dL 8.6 8.9   ALK PHOS U/L 58 83   ALT (SGPT) U/L 15 14   AST (SGOT) U/L 30 16       Microbiology Results Abnormal       Procedure Component Value - Date/Time    Respiratory Panel PCR w/COVID-19(SARS-CoV-2) ALIYA/DL/MARCELL/PAD/COR/RIYA In-House, NP Swab in UTM/VTM, 2 HR TAT - Swab, Nasopharynx [[id number]]  (Normal) Collected: 08/30/24 1021    Lab Status: Final result Specimen: Swab from  Nasopharynx Updated: 08/30/24 1126     ADENOVIRUS, PCR Not Detected     Coronavirus 229E Not Detected     Coronavirus HKU1 Not Detected     Coronavirus NL63 Not Detected     Coronavirus OC43 Not Detected     COVID19 Not Detected     Human Metapneumovirus Not Detected     Human Rhinovirus/Enterovirus Not Detected     Influenza A PCR Not Detected     Influenza B PCR Not Detected     Parainfluenza Virus 1 Not Detected     Parainfluenza Virus 2 Not Detected     Parainfluenza Virus 3 Not Detected     Parainfluenza Virus 4 Not Detected     RSV, PCR Not Detected     Bordetella pertussis pcr Not Detected     Bordetella parapertussis PCR Not Detected     Chlamydophila pneumoniae PCR Not Detected     Mycoplasma pneumo by PCR Not Detected    Narrative:      In the setting of a positive respiratory panel with a viral infection PLUS a negative procalcitonin without other underlying concern for bacterial infection, consider observing off antibiotics or discontinuation of antibiotics and continue supportive care. If the respiratory panel is positive for atypical bacterial infection (Bordetella pertussis, Chlamydophila pneumoniae, or Mycoplasma pneumoniae), consider antibiotic de-escalation to target atypical bacterial infection.          CT Angiogram Chest    Result Date: 8/30/2024  1.No acute abnormality is identified within the thorax. Specifically, there is no evidence of acute pulmonary embolism. 2.Pulmonary emphysema. Please correlate with patient's smoking history/risk factors to determine whether the patient meets criteria for routine lung cancer screening with low dose chest CT. 3.Additional findings as detailed above. Electronically Signed: Gunnar Aparicio MD  8/30/2024 11:50 AM EDT  Workstation ID: [id number]    XR Chest 1 View    Result Date: 8/30/2024  Impression: No acute process. Electronically Signed: Latasha Thomas MD  8/30/2024 10:10 AM EDT  Workstation ID: [id number]   Results for orders placed during the hospital  encounter of 08/30/24    Adult Transthoracic Echo Complete W/ Cont if Necessary Per Protocol    Interpretation Summary    Left ventricular ejection fraction appears to be greater than 70%.    Left ventricular wall thickness is consistent with mild posterior asymmetric hypertrophy.    The right ventricular cavity is mild to moderately dilated.    The left atrial cavity is mildly dilated.    Left atrial volume is mildly increased.    The right atrial cavity is moderately  dilated.    Estimated right ventricular systolic pressure from tricuspid regurgitation is normal (<35 mmHg).      I have reviewed the medications.    Assessment/Plan     Assessment/Problem List    COPD with exacerbation    Cor pulmonale    Hyperlipidemia    Lumbar spondylosis    Essential hypertension    Acute and chronic respiratory failure with hypoxia      Plan  70 yo with HO HTN, HLP, COPD who presents with acute illness and respiratory distress    Acute on Chronic hypoxic respiratory failure  COPD Exacerbation  -hydrate, steroids, scheduled and PRN nebs--change steroids to oral  -sputum culture  -doxycycline, will add rocephin  -no evidence of PE  --has oxygen at home    HTN  HLP  -not on a statin, cont bisoprolol, consider different agent  -check echo  -needs ischemic risk stratification not urgently    Nicotine dependence  Anxiety  -prn benzos and NRT      Dehydration with STEFAN  -better after IVFs    VTE Prophylaxis:  Pharmacologic VTE prophylaxis orders are present.    Code Status (Patient has no pulse and is not breathing): CPR (Attempt to Resuscitate)  Medical Interventions (Patient has pulse or is breathing): Full Support      Expected Discharge  Expected Discharge Date: 9/2/2024; Expected Discharge Time:     Electronically signed by Reyna Sanchez MD, 09/01/24

## 2024-09-01 NOTE — PLAN OF CARE
Goal Outcome Evaluation:  Plan of Care Reviewed With: patient           Outcome Evaluation: OT eval complete. Pt presents with soa and decreased act richie. Pt completed bed mob with sba, sts with sba, cga for t/f and taking steps to chair. Pt did not use ae, however O2 sat dropped to 85% on 4L. Recommend IPOT and Outpt rehab at d/c.      Anticipated Discharge Disposition (OT): home, home with outpatient therapy services

## 2024-09-02 ENCOUNTER — APPOINTMENT (OUTPATIENT)
Dept: GENERAL RADIOLOGY | Facility: HOSPITAL | Age: 69
End: 2024-09-02
Payer: MEDICARE

## 2024-09-02 LAB
ARTERIAL PATENCY WRIST A: POSITIVE
ATMOSPHERIC PRESS: ABNORMAL MM[HG]
BASE EXCESS BLDA CALC-SCNC: 14.2 MMOL/L (ref 0–2)
BDY SITE: ABNORMAL
BODY TEMPERATURE: 37
CO2 BLDA-SCNC: 45.4 MMOL/L (ref 22–33)
COHGB MFR BLD: 1.5 % (ref 0–2)
EPAP: 0
HCO3 BLDA-SCNC: 43.2 MMOL/L (ref 20–26)
HCT VFR BLD CALC: 46.3 % (ref 38–51)
HGB BLDA-MCNC: 15.1 G/DL (ref 14–18)
INHALED O2 CONCENTRATION: 50 %
IPAP: 0
Lab: ABNORMAL
METHGB BLD QL: 0.3 % (ref 0–1.5)
MODALITY: ABNORMAL
NOTIFIED BY: ABNORMAL
NOTIFIED WHO: ABNORMAL
OXYHGB MFR BLDV: 90.1 % (ref 94–99)
PAW @ PEAK INSP FLOW SETTING VENT: 0 CMH2O
PCO2 BLDA: 72.2 MM HG (ref 35–45)
PCO2 TEMP ADJ BLD: 72.2 MM HG (ref 35–45)
PH BLDA: 7.39 PH UNITS (ref 7.35–7.45)
PH, TEMP CORRECTED: 7.39 PH UNITS
PO2 BLDA: 65.5 MM HG (ref 83–108)
PO2 TEMP ADJ BLD: 65.5 MM HG (ref 83–108)
TOTAL RATE: 0 BREATHS/MINUTE

## 2024-09-02 PROCEDURE — 94799 UNLISTED PULMONARY SVC/PX: CPT

## 2024-09-02 PROCEDURE — 36600 WITHDRAWAL OF ARTERIAL BLOOD: CPT

## 2024-09-02 PROCEDURE — 71045 X-RAY EXAM CHEST 1 VIEW: CPT

## 2024-09-02 PROCEDURE — 94660 CPAP INITIATION&MGMT: CPT

## 2024-09-02 PROCEDURE — 99232 SBSQ HOSP IP/OBS MODERATE 35: CPT | Performed by: STUDENT IN AN ORGANIZED HEALTH CARE EDUCATION/TRAINING PROGRAM

## 2024-09-02 PROCEDURE — 82375 ASSAY CARBOXYHB QUANT: CPT

## 2024-09-02 PROCEDURE — 94664 DEMO&/EVAL PT USE INHALER: CPT

## 2024-09-02 PROCEDURE — 82805 BLOOD GASES W/O2 SATURATION: CPT

## 2024-09-02 PROCEDURE — 25010000002 CEFTRIAXONE PER 250 MG: Performed by: STUDENT IN AN ORGANIZED HEALTH CARE EDUCATION/TRAINING PROGRAM

## 2024-09-02 PROCEDURE — 25010000002 METHYLPREDNISOLONE PER 125 MG: Performed by: STUDENT IN AN ORGANIZED HEALTH CARE EDUCATION/TRAINING PROGRAM

## 2024-09-02 PROCEDURE — 25010000002 ENOXAPARIN PER 10 MG: Performed by: INTERNAL MEDICINE

## 2024-09-02 PROCEDURE — 83050 HGB METHEMOGLOBIN QUAN: CPT

## 2024-09-02 RX ORDER — IPRATROPIUM BROMIDE AND ALBUTEROL SULFATE 2.5; .5 MG/3ML; MG/3ML
3 SOLUTION RESPIRATORY (INHALATION)
Status: DISCONTINUED | OUTPATIENT
Start: 2024-09-02 | End: 2024-09-03 | Stop reason: HOSPADM

## 2024-09-02 RX ORDER — METHYLPREDNISOLONE SODIUM SUCCINATE 125 MG/2ML
60 INJECTION, POWDER, LYOPHILIZED, FOR SOLUTION INTRAMUSCULAR; INTRAVENOUS EVERY 24 HOURS
Status: DISCONTINUED | OUTPATIENT
Start: 2024-09-02 | End: 2024-09-03

## 2024-09-02 RX ORDER — IPRATROPIUM BROMIDE AND ALBUTEROL SULFATE 2.5; .5 MG/3ML; MG/3ML
3 SOLUTION RESPIRATORY (INHALATION) EVERY 6 HOURS PRN
Status: DISCONTINUED | OUTPATIENT
Start: 2024-09-02 | End: 2024-09-03 | Stop reason: HOSPADM

## 2024-09-02 RX ORDER — TRAMADOL HYDROCHLORIDE 50 MG/1
50 TABLET ORAL EVERY 8 HOURS PRN
Status: DISCONTINUED | OUTPATIENT
Start: 2024-09-02 | End: 2024-09-03 | Stop reason: HOSPADM

## 2024-09-02 RX ADMIN — AMLODIPINE BESYLATE 5 MG: 5 TABLET ORAL at 08:19

## 2024-09-02 RX ADMIN — IPRATROPIUM BROMIDE AND ALBUTEROL SULFATE 3 ML: 2.5; .5 SOLUTION RESPIRATORY (INHALATION) at 20:10

## 2024-09-02 RX ADMIN — IPRATROPIUM BROMIDE AND ALBUTEROL SULFATE 3 ML: 2.5; .5 SOLUTION RESPIRATORY (INHALATION) at 13:22

## 2024-09-02 RX ADMIN — DOXYCYCLINE 100 MG: 100 CAPSULE ORAL at 21:31

## 2024-09-02 RX ADMIN — GABAPENTIN 800 MG: 400 CAPSULE ORAL at 05:32

## 2024-09-02 RX ADMIN — IPRATROPIUM BROMIDE AND ALBUTEROL SULFATE 3 ML: 2.5; .5 SOLUTION RESPIRATORY (INHALATION) at 16:29

## 2024-09-02 RX ADMIN — TRAZODONE HYDROCHLORIDE 100 MG: 100 TABLET ORAL at 21:31

## 2024-09-02 RX ADMIN — ENOXAPARIN SODIUM 40 MG: 100 INJECTION SUBCUTANEOUS at 08:20

## 2024-09-02 RX ADMIN — NICOTINE 1 PATCH: 21 PATCH TRANSDERMAL at 08:20

## 2024-09-02 RX ADMIN — METHYLPREDNISOLONE SODIUM SUCCINATE 60 MG: 125 INJECTION, POWDER, FOR SOLUTION INTRAMUSCULAR; INTRAVENOUS at 08:20

## 2024-09-02 RX ADMIN — FAMOTIDINE 20 MG: 20 TABLET, FILM COATED ORAL at 08:19

## 2024-09-02 RX ADMIN — GABAPENTIN 800 MG: 400 CAPSULE ORAL at 21:31

## 2024-09-02 RX ADMIN — ALPRAZOLAM 0.25 MG: 0.25 TABLET ORAL at 21:31

## 2024-09-02 RX ADMIN — IPRATROPIUM BROMIDE AND ALBUTEROL SULFATE 3 ML: 2.5; .5 SOLUTION RESPIRATORY (INHALATION) at 07:19

## 2024-09-02 RX ADMIN — IPRATROPIUM BROMIDE AND ALBUTEROL SULFATE 3 ML: 2.5; .5 SOLUTION RESPIRATORY (INHALATION) at 23:31

## 2024-09-02 RX ADMIN — FAMOTIDINE 20 MG: 20 TABLET, FILM COATED ORAL at 16:33

## 2024-09-02 RX ADMIN — Medication 10 ML: at 21:32

## 2024-09-02 RX ADMIN — DOXYCYCLINE 100 MG: 100 CAPSULE ORAL at 08:19

## 2024-09-02 RX ADMIN — TRAMADOL HYDROCHLORIDE 50 MG: 50 TABLET ORAL at 21:32

## 2024-09-02 RX ADMIN — SODIUM CHLORIDE 1000 MG: 900 INJECTION INTRAVENOUS at 23:06

## 2024-09-02 RX ADMIN — GABAPENTIN 800 MG: 400 CAPSULE ORAL at 13:51

## 2024-09-02 NOTE — PROGRESS NOTES
[facility] Medicine Services  PROGRESS NOTE    Patient Name: Elvi Keita  : 1955  MRN: [id number]    Date of Admission: 2024  Primary Care Physician: Maria Alejandra West APRN    Subjective   Subjective     CC:  shortness of breath    HPI:  Patient placed on BiPAP this AM due to nocturnal hypoxia with desaturations. Reports feeling well this AM. Desatted with ambulation on afternoon.       Objective   Objective     Vital Signs:   Temp:  [97.3 °F (36.3 °C)-98.9 °F (37.2 °C)] 98.1 °F (36.7 °C)  Heart Rate:  [68-84] 79  Resp:  [16-18] 18  BP: (143-150)/(54-68) 150/62  Flow (L/min):  [3-4] 3     Physical Exam:  Constitutional: Awake, alert, resting comfortably  HENT: NCAT, mucous membranes moist  Respiratory: Mild wheezes in upper lung fields bilaterally, requiring low-flow oxygen  Cardiovascular: RRR, no murmurs, rubs, or gallops  Gastrointestinal: soft, nontender, nondistended  Musculoskeletal: No bilateral ankle edema  Psychiatric: Appropriate affect, cooperative  Neurologic: Alert and oriented x 3, no focal deficits, speech clear  Skin: No rashes      Results Reviewed:  LAB RESULTS:      Lab 24  04524  0944   WBC 8.24 8.34   HEMOGLOBIN 14.3 14.9   HEMATOCRIT 48.3* 49.6*   PLATELETS 137* 181   NEUTROS ABS 5.61 6.21   IMMATURE GRANS (ABS) 0.03 0.02   LYMPHS ABS 1.96 1.40   MONOS ABS 0.62 0.59   EOS ABS 0.00 0.06   .1* 104.0*         Lab 24  0456 24  0944   SODIUM 141 135*   POTASSIUM 4.5 4.5   CHLORIDE 98 93*   CO2 41.0* 36.0*   ANION GAP 2.0* 6.0   BUN 14 11   CREATININE 0.78 1.12*   EGFR 82.3 53.3*   GLUCOSE 84 109*   CALCIUM 8.6 8.9         Lab 24  04524  0944   TOTAL PROTEIN 5.1* 6.2   ALBUMIN 3.2* 3.9   GLOBULIN 1.9 2.3   ALT (SGPT) 15 14   AST (SGOT) 30 16   BILIRUBIN 0.4 0.4   ALK PHOS 58 83         Lab 24  0944   PROBNP 6,982.0*   HSTROP T 21*                 Lab 24  0621   PH, ARTERIAL 7.386   PCO2, ARTERIAL 72.2*   PO2  ART 65.5*   FIO2 50   HCO3 ART 43.2*   BASE EXCESS ART 14.2*   CARBOXYHEMOGLOBIN 1.5     Brief Urine Lab Results       None            Microbiology Results Abnormal       Procedure Component Value - Date/Time    Respiratory Panel PCR w/COVID-19(SARS-CoV-2) ALIYA/DL/MARCELL/PAD/COR/RIYA In-House, NP Swab in UTM/VTM, 2 HR TAT - Swab, Nasopharynx [[id number]]  (Normal) Collected: 08/30/24 1021    Lab Status: Final result Specimen: Swab from Nasopharynx Updated: 08/30/24 1126     ADENOVIRUS, PCR Not Detected     Coronavirus 229E Not Detected     Coronavirus HKU1 Not Detected     Coronavirus NL63 Not Detected     Coronavirus OC43 Not Detected     COVID19 Not Detected     Human Metapneumovirus Not Detected     Human Rhinovirus/Enterovirus Not Detected     Influenza A PCR Not Detected     Influenza B PCR Not Detected     Parainfluenza Virus 1 Not Detected     Parainfluenza Virus 2 Not Detected     Parainfluenza Virus 3 Not Detected     Parainfluenza Virus 4 Not Detected     RSV, PCR Not Detected     Bordetella pertussis pcr Not Detected     Bordetella parapertussis PCR Not Detected     Chlamydophila pneumoniae PCR Not Detected     Mycoplasma pneumo by PCR Not Detected    Narrative:      In the setting of a positive respiratory panel with a viral infection PLUS a negative procalcitonin without other underlying concern for bacterial infection, consider observing off antibiotics or discontinuation of antibiotics and continue supportive care. If the respiratory panel is positive for atypical bacterial infection (Bordetella pertussis, Chlamydophila pneumoniae, or Mycoplasma pneumoniae), consider antibiotic de-escalation to target atypical bacterial infection.            XR Chest 1 View    Result Date: 9/2/2024  XR CHEST 1 VW Date of Exam: 9/2/2024 6:03 AM EDT Indication: hypoxia Comparison: Chest radiograph 8/30/2024 Findings: The heart size is within normal limits. There is diffuse emphysema. There are chronic interstitial changes  throughout both lungs with mild bilateral basilar atelectasis. There is mild scarring at the left lung base.     Impression: Impression: Emphysema. Chronic interstitial changes. No active disease. Electronically Signed: Vidal Can MD  9/2/2024 6:23 AM EDT  Workstation ID: [id number]     Results for orders placed during the hospital encounter of 08/30/24    Adult Transthoracic Echo Complete W/ Cont if Necessary Per Protocol    Interpretation Summary    Left ventricular ejection fraction appears to be greater than 70%.    Left ventricular wall thickness is consistent with mild posterior asymmetric hypertrophy.    The right ventricular cavity is mild to moderately dilated.    The left atrial cavity is mildly dilated.    Left atrial volume is mildly increased.    The right atrial cavity is moderately  dilated.    Estimated right ventricular systolic pressure from tricuspid regurgitation is normal (<35 mmHg).      Current medications:  Scheduled Meds:amLODIPine, 5 mg, Oral, Q24H  cefTRIAXone, 1,000 mg, Intravenous, Q24H  doxycycline, 100 mg, Oral, Q12H  enoxaparin, 40 mg, Subcutaneous, Q24H  famotidine, 20 mg, Oral, BID AC  gabapentin, 800 mg, Oral, Q8H  ipratropium-albuterol, 3 mL, Nebulization, Q4H - RT  methylPREDNISolone sodium succinate, 60 mg, Intravenous, Q24H  nicotine, 1 patch, Transdermal, Q24H  traZODone, 100 mg, Oral, Nightly      Continuous Infusions:   PRN Meds:.  ALPRAZolam    ipratropium-albuterol    nitroglycerin    sodium chloride    Assessment & Plan   Assessment & Plan     Active Hospital Problems    Diagnosis  POA    **COPD with exacerbation [J44.1]  Yes    Essential hypertension [I10]  Yes    Acute and chronic respiratory failure with hypoxia [J96.21]  Yes    Cor pulmonale [I27.81]  Yes    Hyperlipidemia [E78.5]  Yes    Lumbar spondylosis [M47.816]  Yes      Resolved Hospital Problems    Diagnosis Date Resolved POA    COPD (chronic obstructive pulmonary disease) [J44.9] 08/31/2024 Yes        Brief  Hospital Course to date:  Elvi Keita is a 69 y.o. female with PMHx of advanced COPD with chronic hypoxemic respiratory failure and hypertension who presented to the ED after two days of lying in bed with increased shortness of breath, cough and distress. Her daughter brought her to the ED with oxygen sats of 71% at home.     Acute on chronic hypoxemic and hypercapnic respiratory failure  Acute exacerbation of advanced COPD  -on 3L NC at home, unclear if using 24/7, still desaturating with ambulation   -CTA chest 8/30 negative for PE, showed pulmonary emphysema  -Continue IV Solumedrol, empiric IV ceftriaxone and doxycycline, scheduled and PRN duo-nebs. BiPAP nightly.     Elevated Cr  -improved after IV fluids.     Hypertension- continue amlodipine.  Hyperlipidemia- not on statin  Chronic insomnia- continue trazodone.  Chronic pain- continue home Tramadol, gabapentin.  Nicotine dependence- continue nicotine patch.  Anxiety- PRN Xanax.     Expected Discharge Location and Transportation: Home  Expected Discharge   Expected Discharge Date: 9/3/2024; Expected Discharge Time:      VTE Prophylaxis:  Pharmacologic VTE prophylaxis orders are present.         AM-PAC 6 Clicks Score (PT): 21 (09/02/24 0800)    CODE STATUS:   Code Status and Medical Interventions: CPR (Attempt to Resuscitate); Full Support   Ordered at: 08/30/24 1324     Code Status (Patient has no pulse and is not breathing):    CPR (Attempt to Resuscitate)     Medical Interventions (Patient has pulse or is breathing):    Full Support       Annie Sanabria, DO  09/02/24

## 2024-09-02 NOTE — PLAN OF CARE
Problem: Adult Inpatient Plan of Care  Goal: Plan of Care Review  Outcome: Ongoing, Progressing  Flowsheets  Taken 9/1/2024 1603 by Valencia Varma, PT  Plan of Care Reviewed With: patient  Outcome Evaluation: PT eval completed. Pt presents below baseline function d/t generalized weakness, balance deficits, and decreased activity tolerance. Pt ambulated 40 ft, SBA, and then took standing rest break to check SpO2 w/ desat to 85%. After ~1min rest break and cues for PLB, SpO2 increased to 91% and pt ambulated additional 40 ft.. Pt would benefit from skilled IP PT. Recommend home w/ assist and OP pulmonary rehab at d/c.  Taken 9/1/2024 0257 by Khloe Verdugo RN  Progress: improving  Goal: Patient-Specific Goal (Individualized)  Outcome: Ongoing, Progressing  Goal: Absence of Hospital-Acquired Illness or Injury  Outcome: Ongoing, Progressing  Intervention: Identify and Manage Fall Risk  Flowsheets  Taken 9/2/2024 0000  Safety Promotion/Fall Prevention:   activity supervised   assistive device/personal items within reach   clutter free environment maintained   fall prevention program maintained   lighting adjusted   nonskid shoes/slippers when out of bed   room organization consistent   safety round/check completed  Taken 9/1/2024 2200  Safety Promotion/Fall Prevention:   activity supervised   assistive device/personal items within reach   clutter free environment maintained   fall prevention program maintained   lighting adjusted   nonskid shoes/slippers when out of bed   room organization consistent   safety round/check completed  Taken 9/1/2024 2000  Safety Promotion/Fall Prevention:   activity supervised   assistive device/personal items within reach   clutter free environment maintained   fall prevention program maintained   lighting adjusted   nonskid shoes/slippers when out of bed   room organization consistent   safety round/check completed  Intervention: Prevent Skin Injury  Flowsheets  Taken 9/2/2024  0000  Body Position: position changed independently  Skin Protection:   adhesive use limited   tubing/devices free from skin contact  Taken 9/1/2024 2200  Body Position: position changed independently  Skin Protection:   adhesive use limited   tubing/devices free from skin contact  Taken 9/1/2024 2000  Body Position: position changed independently  Skin Protection:   adhesive use limited   tubing/devices free from skin contact  Intervention: Prevent and Manage VTE (Venous Thromboembolism) Risk  Flowsheets  Taken 9/2/2024 0000  Activity Management: activity encouraged  Taken 9/1/2024 2200  Activity Management: activity encouraged  Taken 9/1/2024 2000  Activity Management: activity encouraged  Intervention: Prevent Infection  Recent Flowsheet Documentation  Taken 9/2/2024 0000 by Kimmy Hurst RN  Infection Prevention:   environmental surveillance performed   hand hygiene promoted   rest/sleep promoted   single patient room provided  Taken 9/1/2024 2200 by Kimmy Hurst RN  Infection Prevention: rest/sleep promoted  Taken 9/1/2024 2000 by Kimmy Hurst RN  Infection Prevention:   environmental surveillance performed   hand hygiene promoted   rest/sleep promoted   single patient room provided  Goal: Optimal Comfort and Wellbeing  Outcome: Ongoing, Progressing  Intervention: Provide Person-Centered Care  Recent Flowsheet Documentation  Taken 9/2/2024 0000 by Kimmy Hurst RN  Trust Relationship/Rapport:   care explained   choices provided   emotional support provided   empathic listening provided   questions answered   questions encouraged   reassurance provided   thoughts/feelings acknowledged  Taken 9/1/2024 2200 by Kimmy Hurst RN  Trust Relationship/Rapport:   care explained   choices provided   empathic listening provided   emotional support provided   questions answered   questions encouraged   reassurance provided   thoughts/feelings acknowledged  Taken 9/1/2024 2000 by  Kimmy Hurst RN  Trust Relationship/Rapport:   care explained   choices provided   emotional support provided   empathic listening provided   questions answered   questions encouraged   reassurance provided   thoughts/feelings acknowledged  Goal: Readiness for Transition of Care  Outcome: Ongoing, Progressing     Problem: COPD (Chronic Obstructive Pulmonary Disease) Comorbidity  Goal: Maintenance of COPD Symptom Control  Outcome: Ongoing, Progressing  Intervention: Maintain COPD-Symptom Control  Recent Flowsheet Documentation  Taken 9/2/2024 0000 by Kimmy Hurst RN  Supportive Measures:   active listening utilized   decision-making supported   relaxation techniques promoted  Taken 9/1/2024 2200 by Kimmy Hurst RN  Supportive Measures:   active listening utilized   decision-making supported   relaxation techniques promoted  Taken 9/1/2024 2000 by Kimmy Hurst RN  Supportive Measures:   active listening utilized   decision-making supported   relaxation techniques promoted  Medication Review/Management: medications reviewed     Problem: Skin Injury Risk Increased  Goal: Skin Health and Integrity  Outcome: Ongoing, Progressing  Intervention: Optimize Skin Protection  Recent Flowsheet Documentation  Taken 9/2/2024 0000 by Kimmy Hurst RN  Pressure Reduction Techniques:   frequent weight shift encouraged   heels elevated off bed   positioned off wounds   weight shift assistance provided  Head of Bed (HOB) Positioning: HOB elevated  Pressure Reduction Devices:   positioning supports utilized   pressure-redistributing mattress utilized  Skin Protection:   adhesive use limited   tubing/devices free from skin contact  Taken 9/1/2024 2200 by Kimmy Hurst RN  Pressure Reduction Techniques:   frequent weight shift encouraged   heels elevated off bed   positioned off wounds   weight shift assistance provided  Head of Bed (HOB) Positioning: HOB elevated  Pressure Reduction Devices:    positioning supports utilized   pressure-redistributing mattress utilized  Skin Protection:   adhesive use limited   tubing/devices free from skin contact  Taken 9/1/2024 2000 by Kimmy Hurst, RN  Pressure Reduction Techniques:   frequent weight shift encouraged   heels elevated off bed   positioned off wounds   weight shift assistance provided  Head of Bed (HOB) Positioning: HOB elevated  Pressure Reduction Devices:   positioning supports utilized   pressure-redistributing mattress utilized  Skin Protection:   adhesive use limited   tubing/devices free from skin contact     Problem: Fall Injury Risk  Goal: Absence of Fall and Fall-Related Injury  Outcome: Ongoing, Progressing  Intervention: Identify and Manage Contributors  Recent Flowsheet Documentation  Taken 9/1/2024 2000 by Kimmy Hurst, RN  Medication Review/Management: medications reviewed  Intervention: Promote Injury-Free Environment  Recent Flowsheet Documentation  Taken 9/2/2024 0000 by Kimmy Hurst, RN  Safety Promotion/Fall Prevention:   activity supervised   assistive device/personal items within reach   clutter free environment maintained   fall prevention program maintained   lighting adjusted   nonskid shoes/slippers when out of bed   room organization consistent   safety round/check completed  Taken 9/1/2024 2200 by Kimmy Hurst, RN  Safety Promotion/Fall Prevention:   activity supervised   assistive device/personal items within reach   clutter free environment maintained   fall prevention program maintained   lighting adjusted   nonskid shoes/slippers when out of bed   room organization consistent   safety round/check completed  Taken 9/1/2024 2000 by Kimmy Hurst, RN  Safety Promotion/Fall Prevention:   activity supervised   assistive device/personal items within reach   clutter free environment maintained   fall prevention program maintained   lighting adjusted   nonskid shoes/slippers when out of bed   room  organization consistent   safety round/check completed   Goal Outcome Evaluation:

## 2024-09-02 NOTE — NURSING NOTE
Spoke with respiratory about placing a venturi mask on patient due to mouth breathing and desats to 60's, even with O2 increased on nasal canula.

## 2024-09-02 NOTE — PLAN OF CARE
Problem: Adult Inpatient Plan of Care  Goal: Plan of Care Review  Outcome: Ongoing, Progressing  Goal: Patient-Specific Goal (Individualized)  Outcome: Ongoing, Progressing  Goal: Absence of Hospital-Acquired Illness or Injury  Outcome: Ongoing, Progressing  Intervention: Identify and Manage Fall Risk  Recent Flowsheet Documentation  Taken 9/2/2024 1200 by Zakia Obando RN  Safety Promotion/Fall Prevention:   safety round/check completed   activity supervised  Taken 9/2/2024 1000 by Zakia Obando RN  Safety Promotion/Fall Prevention:   activity supervised   safety round/check completed  Taken 9/2/2024 0800 by Zakia Obando RN  Safety Promotion/Fall Prevention:   activity supervised   safety round/check completed  Intervention: Prevent Skin Injury  Recent Flowsheet Documentation  Taken 9/2/2024 1200 by Zakia Obando RN  Body Position: position changed independently  Skin Protection:   adhesive use limited   transparent dressing maintained   tubing/devices free from skin contact  Taken 9/2/2024 1000 by Zakia Obando RN  Body Position: position changed independently  Skin Protection:   adhesive use limited   tubing/devices free from skin contact   transparent dressing maintained  Taken 9/2/2024 0800 by Zakia Obando RN  Body Position: position changed independently  Skin Protection:   adhesive use limited   tubing/devices free from skin contact   transparent dressing maintained  Intervention: Prevent and Manage VTE (Venous Thromboembolism) Risk  Recent Flowsheet Documentation  Taken 9/2/2024 1200 by Zakia Obando RN  Activity Management: activity encouraged  Taken 9/2/2024 1000 by Zakia Obando RN  Activity Management: activity minimized  Taken 9/2/2024 0800 by Zakia Obando RN  Activity Management: activity minimized  Goal: Optimal Comfort and Wellbeing  Outcome: Ongoing, Progressing  Goal: Readiness for Transition of Care  Outcome: Ongoing, Progressing     Problem: COPD (Chronic Obstructive Pulmonary  Disease) Comorbidity  Goal: Maintenance of COPD Symptom Control  Outcome: Ongoing, Progressing     Problem: Skin Injury Risk Increased  Goal: Skin Health and Integrity  Outcome: Ongoing, Progressing  Intervention: Optimize Skin Protection  Recent Flowsheet Documentation  Taken 9/2/2024 1200 by Zakia Obando RN  Pressure Reduction Techniques: frequent weight shift encouraged  Pressure Reduction Devices: pressure-redistributing mattress utilized  Skin Protection:   adhesive use limited   transparent dressing maintained   tubing/devices free from skin contact  Taken 9/2/2024 1000 by Zakia Obando RN  Pressure Reduction Techniques: frequent weight shift encouraged  Pressure Reduction Devices: pressure-redistributing mattress utilized  Skin Protection:   adhesive use limited   tubing/devices free from skin contact   transparent dressing maintained  Taken 9/2/2024 0800 by Zakia Obando RN  Pressure Reduction Techniques: frequent weight shift encouraged  Pressure Reduction Devices: positioning supports utilized  Skin Protection:   adhesive use limited   tubing/devices free from skin contact   transparent dressing maintained     Problem: Fall Injury Risk  Goal: Absence of Fall and Fall-Related Injury  Outcome: Ongoing, Progressing  Intervention: Promote Injury-Free Environment  Recent Flowsheet Documentation  Taken 9/2/2024 1200 by Zakia Obando RN  Safety Promotion/Fall Prevention:   safety round/check completed   activity supervised  Taken 9/2/2024 1000 by Zakia Obando RN  Safety Promotion/Fall Prevention:   activity supervised   safety round/check completed  Taken 9/2/2024 0800 by Zakia Obando RN  Safety Promotion/Fall Prevention:   activity supervised   safety round/check completed     Problem: Noninvasive Ventilation Acute  Goal: Effective Unassisted Ventilation and Oxygenation  Outcome: Ongoing, Progressing   Goal Outcome Evaluation:

## 2024-09-03 ENCOUNTER — READMISSION MANAGEMENT (OUTPATIENT)
Dept: CALL CENTER | Facility: HOSPITAL | Age: 69
End: 2024-09-03
Payer: MEDICARE

## 2024-09-03 VITALS
HEIGHT: 64 IN | DIASTOLIC BLOOD PRESSURE: 65 MMHG | TEMPERATURE: 98.2 F | WEIGHT: 165 LBS | RESPIRATION RATE: 16 BRPM | HEART RATE: 79 BPM | BODY MASS INDEX: 28.17 KG/M2 | SYSTOLIC BLOOD PRESSURE: 146 MMHG | OXYGEN SATURATION: 85 %

## 2024-09-03 PROCEDURE — 25010000002 ENOXAPARIN PER 10 MG: Performed by: INTERNAL MEDICINE

## 2024-09-03 PROCEDURE — 63710000001 PREDNISONE PER 1 MG: Performed by: STUDENT IN AN ORGANIZED HEALTH CARE EDUCATION/TRAINING PROGRAM

## 2024-09-03 PROCEDURE — 94799 UNLISTED PULMONARY SVC/PX: CPT

## 2024-09-03 PROCEDURE — 94664 DEMO&/EVAL PT USE INHALER: CPT

## 2024-09-03 PROCEDURE — 99239 HOSP IP/OBS DSCHRG MGMT >30: CPT | Performed by: STUDENT IN AN ORGANIZED HEALTH CARE EDUCATION/TRAINING PROGRAM

## 2024-09-03 RX ORDER — DOXYCYCLINE 100 MG/1
100 CAPSULE ORAL 2 TIMES DAILY
Qty: 2 CAPSULE | Refills: 0 | Status: SHIPPED | OUTPATIENT
Start: 2024-09-03

## 2024-09-03 RX ORDER — PREDNISONE 20 MG/1
40 TABLET ORAL
Status: DISCONTINUED | OUTPATIENT
Start: 2024-09-03 | End: 2024-09-03 | Stop reason: HOSPADM

## 2024-09-03 RX ORDER — PREDNISONE 10 MG/1
TABLET ORAL
Qty: 26 TABLET | Refills: 0 | Status: SHIPPED | OUTPATIENT
Start: 2024-09-04 | End: 2024-09-14

## 2024-09-03 RX ORDER — PREDNISONE 5 MG/1
10 TABLET ORAL
Status: DISCONTINUED | OUTPATIENT
Start: 2024-09-12 | End: 2024-09-03 | Stop reason: HOSPADM

## 2024-09-03 RX ORDER — POLYETHYLENE GLYCOL 3350 17 G/17G
17 POWDER, FOR SOLUTION ORAL DAILY
Status: DISCONTINUED | OUTPATIENT
Start: 2024-09-03 | End: 2024-09-03 | Stop reason: HOSPADM

## 2024-09-03 RX ORDER — PREDNISONE 20 MG/1
20 TABLET ORAL
Status: DISCONTINUED | OUTPATIENT
Start: 2024-09-09 | End: 2024-09-03 | Stop reason: HOSPADM

## 2024-09-03 RX ADMIN — PREDNISONE 40 MG: 20 TABLET ORAL at 08:55

## 2024-09-03 RX ADMIN — ENOXAPARIN SODIUM 40 MG: 100 INJECTION SUBCUTANEOUS at 08:36

## 2024-09-03 RX ADMIN — AMLODIPINE BESYLATE 5 MG: 5 TABLET ORAL at 08:35

## 2024-09-03 RX ADMIN — IPRATROPIUM BROMIDE AND ALBUTEROL SULFATE 3 ML: 2.5; .5 SOLUTION RESPIRATORY (INHALATION) at 07:25

## 2024-09-03 RX ADMIN — IPRATROPIUM BROMIDE AND ALBUTEROL SULFATE 3 ML: 2.5; .5 SOLUTION RESPIRATORY (INHALATION) at 11:33

## 2024-09-03 RX ADMIN — DOXYCYCLINE 100 MG: 100 CAPSULE ORAL at 08:35

## 2024-09-03 RX ADMIN — NICOTINE 1 PATCH: 21 PATCH TRANSDERMAL at 08:36

## 2024-09-03 RX ADMIN — GABAPENTIN 800 MG: 400 CAPSULE ORAL at 05:33

## 2024-09-03 RX ADMIN — IPRATROPIUM BROMIDE AND ALBUTEROL SULFATE 3 ML: 2.5; .5 SOLUTION RESPIRATORY (INHALATION) at 03:53

## 2024-09-03 RX ADMIN — FAMOTIDINE 20 MG: 20 TABLET, FILM COATED ORAL at 08:35

## 2024-09-03 RX ADMIN — GABAPENTIN 800 MG: 400 CAPSULE ORAL at 13:05

## 2024-09-03 RX ADMIN — POLYETHYLENE GLYCOL 3350 17 G: 17 POWDER, FOR SOLUTION ORAL at 11:30

## 2024-09-03 NOTE — DISCHARGE INSTRUCTIONS
-Follow up with PCP in 1 week  -Follow up with Pulmonary at [facility] in 2 weeks  -Complete prednisone taper   -Complete doxycycline x1 more day

## 2024-09-03 NOTE — PLAN OF CARE
Problem: Adult Inpatient Plan of Care  Goal: Plan of Care Review  Outcome: Ongoing, Progressing  Flowsheets  Taken 9/1/2024 1603 by Valencia Varma, PT  Plan of Care Reviewed With: patient  Outcome Evaluation: PT eval completed. Pt presents below baseline function d/t generalized weakness, balance deficits, and decreased activity tolerance. Pt ambulated 40 ft, SBA, and then took standing rest break to check SpO2 w/ desat to 85%. After ~1min rest break and cues for PLB, SpO2 increased to 91% and pt ambulated additional 40 ft.. Pt would benefit from skilled IP PT. Recommend home w/ assist and OP pulmonary rehab at d/c.  Taken 9/1/2024 0257 by Khloe Verdugo RN  Progress: improving  Goal: Patient-Specific Goal (Individualized)  Outcome: Ongoing, Progressing  Goal: Absence of Hospital-Acquired Illness or Injury  Outcome: Ongoing, Progressing  Intervention: Identify and Manage Fall Risk  Flowsheets  Taken 9/3/2024 0400  Safety Promotion/Fall Prevention:   activity supervised   assistive device/personal items within reach   clutter free environment maintained   fall prevention program maintained   lighting adjusted   nonskid shoes/slippers when out of bed   room organization consistent   safety round/check completed  Taken 9/3/2024 0200  Safety Promotion/Fall Prevention:   activity supervised   assistive device/personal items within reach   clutter free environment maintained   lighting adjusted   nonskid shoes/slippers when out of bed   room organization consistent   safety round/check completed  Taken 9/3/2024 0000  Safety Promotion/Fall Prevention:   activity supervised   assistive device/personal items within reach   clutter free environment maintained   fall prevention program maintained   lighting adjusted   nonskid shoes/slippers when out of bed   room organization consistent   safety round/check completed  Taken 9/2/2024 2200  Safety Promotion/Fall Prevention:   activity supervised   assistive device/personal  items within reach   clutter free environment maintained   fall prevention program maintained   lighting adjusted   nonskid shoes/slippers when out of bed   room organization consistent   safety round/check completed  Taken 9/2/2024 2000  Safety Promotion/Fall Prevention:   activity supervised   assistive device/personal items within reach   clutter free environment maintained   fall prevention program maintained   lighting adjusted   nonskid shoes/slippers when out of bed   room organization consistent   safety round/check completed  Intervention: Prevent Skin Injury  Flowsheets  Taken 9/3/2024 0400  Body Position: position changed independently  Taken 9/3/2024 0200  Body Position: position changed independently  Skin Protection:   adhesive use limited   tubing/devices free from skin contact  Taken 9/3/2024 0000  Body Position: position changed independently  Skin Protection:   adhesive use limited   tubing/devices free from skin contact  Taken 9/2/2024 2200  Body Position: position changed independently  Skin Protection:   adhesive use limited   tubing/devices free from skin contact  Taken 9/2/2024 2000  Body Position: position changed independently  Skin Protection:   adhesive use limited   tubing/devices free from skin contact  Intervention: Prevent and Manage VTE (Venous Thromboembolism) Risk  Flowsheets  Taken 9/3/2024 0400  Activity Management: activity encouraged  Taken 9/3/2024 0200  Activity Management: activity encouraged  Taken 9/3/2024 0000  Activity Management: activity encouraged  Taken 9/2/2024 2200  Activity Management: activity encouraged  Taken 9/2/2024 2000  Activity Management: activity encouraged  Intervention: Prevent Infection  Flowsheets  Taken 9/3/2024 0400  Infection Prevention:   environmental surveillance performed   hand hygiene promoted   rest/sleep promoted   single patient room provided  Taken 9/3/2024 0000  Infection Prevention:   environmental surveillance performed   hand hygiene  promoted   rest/sleep promoted   single patient room provided  Taken 9/2/2024 2200  Infection Prevention:   environmental surveillance performed   hand hygiene promoted   rest/sleep promoted   single patient room provided  Taken 9/2/2024 2000  Infection Prevention:   environmental surveillance performed   hand hygiene promoted   rest/sleep promoted   single patient room provided  Goal: Optimal Comfort and Wellbeing  Outcome: Ongoing, Progressing  Intervention: Provide Person-Centered Care  Flowsheets  Taken 9/3/2024 0200  Trust Relationship/Rapport:   care explained   choices provided   emotional support provided   empathic listening provided   questions answered   questions encouraged   reassurance provided   thoughts/feelings acknowledged  Taken 9/3/2024 0000  Trust Relationship/Rapport:   care explained   choices provided   emotional support provided   empathic listening provided   questions answered   questions encouraged   reassurance provided   thoughts/feelings acknowledged  Taken 9/2/2024 2200  Trust Relationship/Rapport:   care explained   choices provided   emotional support provided   empathic listening provided   questions answered   questions encouraged   reassurance provided   thoughts/feelings acknowledged  Taken 9/2/2024 2000  Trust Relationship/Rapport:   care explained   choices provided   emotional support provided   empathic listening provided   questions answered   questions encouraged   reassurance provided   thoughts/feelings acknowledged  Goal: Readiness for Transition of Care  Outcome: Ongoing, Progressing  Intervention: Mutually Develop Transition Plan  Flowsheets  Taken 8/30/2024 1520 by Zakia Obando, RN  Equipment Currently Used at Home:   bath bench   scales   oxygen   walker, rolling  Taken 8/30/2024 1132 by Zenaida Alcaraz, RN  Equipment Needed After Discharge: none  Anticipated Changes Related to Illness: none  Transportation Anticipated: family or friend will  provide  Transportation Concerns: none  Concerns to be Addressed: denies needs/concerns at this time  Readmission Within the Last 30 Days: no previous admission in last 30 days  Patient/Family Anticipated Services at Transition:   Patient/Family Anticipates Transition to: home with family     Problem: COPD (Chronic Obstructive Pulmonary Disease) Comorbidity  Goal: Maintenance of COPD Symptom Control  Outcome: Ongoing, Progressing  Intervention: Maintain COPD-Symptom Control  Flowsheets  Taken 9/3/2024 0200  Supportive Measures: active listening utilized  Medication Review/Management: medications reviewed  Taken 9/3/2024 0000  Supportive Measures:   active listening utilized   decision-making supported   relaxation techniques promoted   self-care encouraged   self-reflection promoted  Medication Review/Management: medications reviewed  Taken 9/2/2024 2200  Supportive Measures:   active listening utilized   decision-making supported   positive reinforcement provided   problem-solving facilitated   self-care encouraged   relaxation techniques promoted   self-reflection promoted   self-responsibility promoted  Medication Review/Management: medications reviewed  Taken 9/2/2024 2000  Supportive Measures:   active listening utilized   decision-making supported   relaxation techniques promoted   self-care encouraged   self-reflection promoted  Medication Review/Management: medications reviewed     Problem: Skin Injury Risk Increased  Goal: Skin Health and Integrity  Outcome: Ongoing, Progressing  Intervention: Optimize Skin Protection  Flowsheets  Taken 9/3/2024 0400  Head of Bed (HOB) Positioning: HOB at 60-90 degrees  Taken 9/3/2024 0200  Pressure Reduction Techniques:   frequent weight shift encouraged   heels elevated off bed   positioned off wounds   weight shift assistance provided  Head of Bed (HOB) Positioning: HOB at 20-30 degrees  Pressure Reduction Devices:   positioning supports utilized    pressure-redistributing mattress utilized  Skin Protection:   adhesive use limited   tubing/devices free from skin contact  Taken 9/3/2024 0000  Pressure Reduction Techniques:   frequent weight shift encouraged   heels elevated off bed   positioned off wounds   weight shift assistance provided  Head of Bed (HOB) Positioning: HOB at 20 degrees  Pressure Reduction Devices:   positioning supports utilized   pressure-redistributing mattress utilized  Skin Protection:   adhesive use limited   tubing/devices free from skin contact  Taken 9/2/2024 2200  Pressure Reduction Techniques:   frequent weight shift encouraged   heels elevated off bed   positioned off wounds   weight shift assistance provided  Head of Bed (HOB) Positioning: HOB at 20-30 degrees  Pressure Reduction Devices:   positioning supports utilized   pressure-redistributing mattress utilized  Skin Protection:   adhesive use limited   tubing/devices free from skin contact  Taken 9/2/2024 2000  Pressure Reduction Techniques:   frequent weight shift encouraged   heels elevated off bed   positioned off wounds   weight shift assistance provided  Head of Bed (HOB) Positioning: HOB elevated  Pressure Reduction Devices:   positioning supports utilized   pressure-redistributing mattress utilized  Skin Protection:   adhesive use limited   tubing/devices free from skin contact     Problem: Fall Injury Risk  Goal: Absence of Fall and Fall-Related Injury  Outcome: Ongoing, Progressing  Intervention: Identify and Manage Contributors  Flowsheets  Taken 9/3/2024 0200 by Kimmy Hurst RN  Medication Review/Management: medications reviewed  Taken 9/3/2024 0000 by Kimmy Hurst RN  Medication Review/Management: medications reviewed  Taken 9/2/2024 2200 by Kimmy Hurst RN  Medication Review/Management: medications reviewed  Taken 9/2/2024 2000 by Kimmy Hurst RN  Medication Review/Management: medications reviewed  Taken 9/1/2024 0000 by Kartik  Khloe RN  Self-Care Promotion:   independence encouraged   BADL personal routines maintained   BADL personal objects within reach  Intervention: Promote Injury-Free Environment  Flowsheets  Taken 9/3/2024 0400  Safety Promotion/Fall Prevention:   activity supervised   assistive device/personal items within reach   clutter free environment maintained   fall prevention program maintained   lighting adjusted   nonskid shoes/slippers when out of bed   room organization consistent   safety round/check completed  Taken 9/3/2024 0200  Safety Promotion/Fall Prevention:   activity supervised   assistive device/personal items within reach   clutter free environment maintained   lighting adjusted   nonskid shoes/slippers when out of bed   room organization consistent   safety round/check completed  Taken 9/3/2024 0000  Safety Promotion/Fall Prevention:   activity supervised   assistive device/personal items within reach   clutter free environment maintained   fall prevention program maintained   lighting adjusted   nonskid shoes/slippers when out of bed   room organization consistent   safety round/check completed  Taken 9/2/2024 2200  Safety Promotion/Fall Prevention:   activity supervised   assistive device/personal items within reach   clutter free environment maintained   fall prevention program maintained   lighting adjusted   nonskid shoes/slippers when out of bed   room organization consistent   safety round/check completed  Taken 9/2/2024 2000  Safety Promotion/Fall Prevention:   activity supervised   assistive device/personal items within reach   clutter free environment maintained   fall prevention program maintained   lighting adjusted   nonskid shoes/slippers when out of bed   room organization consistent   safety round/check completed     Problem: Noninvasive Ventilation Acute  Goal: Effective Unassisted Ventilation and Oxygenation  Outcome: Ongoing, Progressing  Intervention: Monitor and Manage Noninvasive  Ventilation  Flowsheets  Taken 9/3/2024 0200  Airway/Ventilation Management:   airway patency maintained   calming measures promoted   pulmonary hygiene promoted  NPPV/CPAP Maintenance: mask secure  Taken 9/3/2024 0000  Airway/Ventilation Management:   airway patency maintained   calming measures promoted   pulmonary hygiene promoted  NPPV/CPAP Maintenance:   mask adjusted   mask secure  Taken 9/2/2024 2320  Airway/Ventilation Management:   airway patency maintained   calming measures promoted   pulmonary hygiene promoted  NPPV/CPAP Maintenance:   mask adjusted   mask secure   PAP titrated for comfort   Goal Outcome Evaluation:

## 2024-09-03 NOTE — CASE MANAGEMENT/SOCIAL WORK
Continued Stay Note  [facility]     Patient Name: Elvi Keita  MRN: [id number]  Today's Date: 9/3/2024    Admit Date: 8/30/2024    Plan: Home at DC   Discharge Plan       Row Name 09/03/24 1230       Plan    Plan Home at DC    Plan Comments I confirmed with Buzz at [facility]. The pt is qualified for continuous home 02 at 4L PTA. Family to bring a portable tank for transport home.    Final Discharge Disposition Code 01 - home or self-care                   Discharge Codes    No documentation.                 Expected Discharge Date and Time       Expected Discharge Date Expected Discharge Time    Sep 3, 2024               Halima Parra RN

## 2024-09-03 NOTE — DISCHARGE SUMMARY
[facility] Medicine Services  DISCHARGE SUMMARY    Patient Name: Elvi Keita  : 1955  MRN: [id number]    Date of Admission: 2024  9:34 AM  Date of Discharge: 9/3/2024  Primary Care Physician: Maria Alejandra West APRN    Consults       No orders found from 2024 to 2024.            Hospital Course     Presenting Problem:     Active Hospital Problems    Diagnosis  POA    **COPD with exacerbation [J44.1]  Yes    Essential hypertension [I10]  Yes    Acute and chronic respiratory failure with hypoxia [J96.21]  Yes    Cor pulmonale [I27.81]  Yes    Hyperlipidemia [E78.5]  Yes    Lumbar spondylosis [M47.816]  Yes      Resolved Hospital Problems    Diagnosis Date Resolved POA    COPD (chronic obstructive pulmonary disease) [J44.9] 2024 Yes          Hospital Course:  Elvi Keita is a 69 y.o. female with PMHx of advanced COPD with chronic hypoxemic respiratory failure and hypertension who presented to the ED after two days of lying in bed with increased shortness of breath, cough and distress. Her daughter brought her to the ED with oxygen sats of 71% at home. Patient found to have acute exacerbation of COPD. Was treated with IV Solu-Medrol, empiric IV ceftriaxone and doxycycline. Symptoms improved and patient was discharged home with plan for close follow-up with PCP and pulmonary medicine at [facility].    Acute on chronic hypoxemic and hypercapnic respiratory failure  Acute exacerbation of advanced COPD  -on 3L NC at home, has not been using 24/7 only at night  -CTA chest  negative for PE, showed pulmonary emphysema  -s/p IV Solumedrol, empiric IV ceftriaxone and doxycycline, scheduled and PRN duo-nebs. BiPAP nightly.   -Continue prednisone taper on discharge, doxycycline x1 more day. Continue supplemental oxygenation at all times, at rest and with ambulation (discussed with patient). Needs BiPAP nightly, will have her follow up with Pulmonary to order this.      Elevated Cr  -improved after IV fluids.      Hypertension- continue amlodipine.  Hyperlipidemia- not on statin  Chronic insomnia- continue trazodone.  Chronic pain- continue home Tramadol, gabapentin.  Nicotine dependence- continue nicotine patch.  Anxiety- PRN Xanax.     Discharge Follow Up Recommendations for outpatient labs/diagnostics:  -Follow-up with PCP in 1 week  -Follow-up with pulmonary medicine at [facility], already established    Day of Discharge     HPI:   Evaluated patient this AM, still with mild cough. No shortness of breath at rest. Wore BiPAP overnight. Feels ready for discharge home.    Vital Signs:   Temp:  [97.6 °F (36.4 °C)-98.2 °F (36.8 °C)] 98.2 °F (36.8 °C)  Heart Rate:  [65-84] 79  Resp:  [16-18] 16  BP: (143-164)/(62-76) 146/65  Flow (L/min):  [3] 3      Physical Exam:  Constitutional: Awake, alert, resting comfortably  HENT: NCAT, mucous membranes moist  Respiratory: Mild wheezes in upper lung fields bilaterally, improving, requiring low-flow oxygen  Cardiovascular: RRR, no murmurs, rubs, or gallops  Gastrointestinal: soft, nontender, nondistended  Musculoskeletal: No bilateral ankle edema  Psychiatric: Appropriate affect, cooperative  Neurologic: Alert and oriented x 3, no focal deficits, speech clear  Skin: No rashes    Pertinent  and/or Most Recent Results     LAB RESULTS:      Lab 09/01/24 0455 08/30/24  0944   WBC 8.24 8.34   HEMOGLOBIN 14.3 14.9   HEMATOCRIT 48.3* 49.6*   PLATELETS 137* 181   NEUTROS ABS 5.61 6.21   IMMATURE GRANS (ABS) 0.03 0.02   LYMPHS ABS 1.96 1.40   MONOS ABS 0.62 0.59   EOS ABS 0.00 0.06   .1* 104.0*         Lab 09/01/24  0456 08/30/24  0944   SODIUM 141 135*   POTASSIUM 4.5 4.5   CHLORIDE 98 93*   CO2 41.0* 36.0*   ANION GAP 2.0* 6.0   BUN 14 11   CREATININE 0.78 1.12*   EGFR 82.3 53.3*   GLUCOSE 84 109*   CALCIUM 8.6 8.9         Lab 09/01/24  0456 08/30/24  0944   TOTAL PROTEIN 5.1* 6.2   ALBUMIN 3.2* 3.9   GLOBULIN 1.9 2.3   ALT (SGPT) 15  14   AST (SGOT) 30 16   BILIRUBIN 0.4 0.4   ALK PHOS 58 83         Lab 08/30/24  0944   PROBNP 6,982.0*   HSTROP T 21*                 Lab 09/02/24  0621   PH, ARTERIAL 7.386   PCO2, ARTERIAL 72.2*   PO2 ART 65.5*   FIO2 50   HCO3 ART 43.2*   BASE EXCESS ART 14.2*   CARBOXYHEMOGLOBIN 1.5     Brief Urine Lab Results       None          Microbiology Results (last 10 days)       Procedure Component Value - Date/Time    Respiratory Panel PCR w/COVID-19(SARS-CoV-2) ALIYA/DL/MARCELL/PAD/COR/RIYA In-House, NP Swab in UTM/VTM, 2 HR TAT - Swab, Nasopharynx [[id number]]  (Normal) Collected: 08/30/24 1021    Lab Status: Final result Specimen: Swab from Nasopharynx Updated: 08/30/24 1126     ADENOVIRUS, PCR Not Detected     Coronavirus 229E Not Detected     Coronavirus HKU1 Not Detected     Coronavirus NL63 Not Detected     Coronavirus OC43 Not Detected     COVID19 Not Detected     Human Metapneumovirus Not Detected     Human Rhinovirus/Enterovirus Not Detected     Influenza A PCR Not Detected     Influenza B PCR Not Detected     Parainfluenza Virus 1 Not Detected     Parainfluenza Virus 2 Not Detected     Parainfluenza Virus 3 Not Detected     Parainfluenza Virus 4 Not Detected     RSV, PCR Not Detected     Bordetella pertussis pcr Not Detected     Bordetella parapertussis PCR Not Detected     Chlamydophila pneumoniae PCR Not Detected     Mycoplasma pneumo by PCR Not Detected    Narrative:      In the setting of a positive respiratory panel with a viral infection PLUS a negative procalcitonin without other underlying concern for bacterial infection, consider observing off antibiotics or discontinuation of antibiotics and continue supportive care. If the respiratory panel is positive for atypical bacterial infection (Bordetella pertussis, Chlamydophila pneumoniae, or Mycoplasma pneumoniae), consider antibiotic de-escalation to target atypical bacterial infection.            XR Chest 1 View    Result Date: 9/2/2024  XR CHEST 1 VW Date  of Exam: 9/2/2024 6:03 AM EDT Indication: hypoxia Comparison: Chest radiograph 8/30/2024 Findings: The heart size is within normal limits. There is diffuse emphysema. There are chronic interstitial changes throughout both lungs with mild bilateral basilar atelectasis. There is mild scarring at the left lung base.     Impression: Emphysema. Chronic interstitial changes. No active disease. Electronically Signed: Vidal Can MD  9/2/2024 6:23 AM EDT  Workstation ID: [id number]    Adult Transthoracic Echo Complete W/ Cont if Necessary Per Protocol    Result Date: 8/31/2024    Left ventricular ejection fraction appears to be greater than 70%.   Left ventricular wall thickness is consistent with mild posterior asymmetric hypertrophy.   The right ventricular cavity is mild to moderately dilated.   The left atrial cavity is mildly dilated.   Left atrial volume is mildly increased.   The right atrial cavity is moderately  dilated.   Estimated right ventricular systolic pressure from tricuspid regurgitation is normal (<35 mmHg).     CT Angiogram Chest    Result Date: 8/30/2024  CT ANGIOGRAM CHEST Date of Exam: 8/30/2024 11:32 AM EDT Indication: PE protocol. Comparison: Chest CTA dated 10/27/2021 Technique: CTA of the chest was performed after the uneventful intravenous administration of 80 mL Isovue-370. Reconstructed coronal and sagittal images were also obtained. In addition, a 3-D volume rendered image was created for interpretation. Automated exposure control and iterative reconstruction methods were used. FINDINGS: Thoracic inlet: Unremarkable. Pulmonary arteries: No filling defects are identified within the pulmonary arteries to suggest acute pulmonary embolism. Great vessels: Atherosclerotic plaque is seen within the thoracic aorta and proximal arch vessels. Mediastinum/Alyce: No pathologically enlarged mediastinal lymph nodes are seen. The esophagus appears unremarkable. Lung parenchyma: Lungs appear emphysematous with  mild bibasilar atelectasis. No acute infiltrate is identified. No suspicious pulmonary nodules are seen. Trachea and airways: The trachea and central airways appear unremarkable. Pleural space: No significant pleural effusion or pneumothorax. Heart and pericardium: Coronary artery calcifications. Otherwise, the heart and pericardium appear unremarkable. Chest wall: No acute or suspicious osseous or soft tissue lesion is identified. Upper abdomen: No acute abnormality is identified within the visualized upper abdomen.     1.No acute abnormality is identified within the thorax. Specifically, there is no evidence of acute pulmonary embolism. 2.Pulmonary emphysema. Please correlate with patient's smoking history/risk factors to determine whether the patient meets criteria for routine lung cancer screening with low dose chest CT. 3.Additional findings as detailed above. Electronically Signed: Gunnar Aparicio MD  8/30/2024 11:50 AM EDT  Workstation ID: [id number]    XR Chest 1 View    Result Date: 8/30/2024  XR CHEST 1 VW Date of Exam: 8/30/2024 9:55 AM EDT Indication: SOA triage protocol Comparison: 10/27/2021. Findings: Heart and pulmonary vessels appear within normal limits for portable technique. Lung fields appear clear of acute infiltrates or effusions.     Impression: No acute process. Electronically Signed: Latasha Thomas MD  8/30/2024 10:10 AM EDT  Workstation ID: [id number]             Results for orders placed during the hospital encounter of 08/30/24    Adult Transthoracic Echo Complete W/ Cont if Necessary Per Protocol    Interpretation Summary    Left ventricular ejection fraction appears to be greater than 70%.    Left ventricular wall thickness is consistent with mild posterior asymmetric hypertrophy.    The right ventricular cavity is mild to moderately dilated.    The left atrial cavity is mildly dilated.    Left atrial volume is mildly increased.    The right atrial cavity is moderately  dilated.     Estimated right ventricular systolic pressure from tricuspid regurgitation is normal (<35 mmHg).      Plan for Follow-up of Pending Labs/Results: N/A    Discharge Details        Discharge Medications        New Medications        Instructions Start Date   doxycycline 100 MG capsule  Commonly known as: VIBRAMYCIN   100 mg, Oral, 2 Times Daily      predniSONE 10 MG tablet  Commonly known as: DELTASONE   Take 4 tablets by mouth Daily With Breakfast for 2 days, THEN 3 tablets Daily With Breakfast for 3 days, THEN 2 tablets Daily With Breakfast for 3 days, THEN 1 tablet Daily With Breakfast for 3 days.   Start Date: September 4, 2024            Continue These Medications        Instructions Start Date   albuterol sulfate  (90 Base) MCG/ACT inhaler  Commonly known as: PROVENTIL HFA;VENTOLIN HFA;PROAIR HFA   2 puffs, Inhalation, Every 6 Hours - RT      Anoro Ellipta 62.5-25 MCG/ACT aerosol powder  inhaler  Generic drug: Umeclidinium-Vilanterol   1 puff, Inhalation, Daily - RT      bisoprolol-hydrochlorothiazide 10-6.25 MG per tablet  Commonly known as: ZIAC   1 tablet, Oral, Daily      gabapentin 800 MG tablet  Commonly known as: NEURONTIN   800 mg, Oral, 3 Times Daily      sertraline 50 MG tablet  Commonly known as: ZOLOFT   50 mg, Oral, Daily      traZODone 100 MG tablet  Commonly known as: DESYREL   100 mg, Oral, Nightly               Allergies   Allergen Reactions    Fish Oil Rash         Discharge Disposition:  Home or Self Care    Diet:  Hospital:  Diet Order   Procedures    Diet: Regular/House; Fluid Consistency: Thin (IDDSI 0)            Activity:      Restrictions or Other Recommendations:  N/A       CODE STATUS:    Code Status and Medical Interventions: CPR (Attempt to Resuscitate); Full Support   Ordered at: 08/30/24 1324     Code Status (Patient has no pulse and is not breathing):    CPR (Attempt to Resuscitate)     Medical Interventions (Patient has pulse or is breathing):    Full Support       No  future appointments.    Additional Instructions for the Follow-ups that You Need to Schedule       Discharge Follow-up with PCP   As directed       Currently Documented PCP:    Maria Alejandra West APRN    PCP Phone Number:    [phone]     Follow Up Details: in 1 week        Discharge Follow-up with Specified Provider: Dr. Swenson, [facility]; 2 Weeks   As directed      To: Dr. Swenson, [facility]   Follow Up: 2 Weeks                      Annie Sanabria DO  09/03/24      Time Spent on Discharge:  I spent  45  minutes on this discharge activity which included: face-to-face encounter with the patient, reviewing the data in the system, coordination of the care with the nursing staff as well as consultants, documentation, and entering orders.

## 2024-09-03 NOTE — NURSING NOTE
Discharge instructions given. Pt expressed thru teach back method what meds to take, what side effects to watch for and when to follow up wi\th her physicians. PIV discontinued, awaiting her ride home.

## 2024-09-04 NOTE — OUTREACH NOTE
Prep Survey      Flowsheet Row Responses   [facility]ond[facility] facility patient discharged from? [facility]   Is LACE score < 7 ? No   Eligibility Readm Mgmt   Discharge diagnosis COPD with exacerbation   Does the patient have one of the following disease processes/diagnoses(primary or secondary)? COPD   Does the patient have Home health ordered? No   Is there a DME ordered? No   Prep survey completed? Yes            BORIS BARFIELD - Registered Nurse

## 2024-09-09 ENCOUNTER — READMISSION MANAGEMENT (OUTPATIENT)
Dept: CALL CENTER | Facility: HOSPITAL | Age: 69
End: 2024-09-09
Payer: MEDICARE

## 2024-09-09 NOTE — OUTREACH NOTE
COPD/PN Week 1 Survey      Flowsheet Row Responses   [facility] facility patient discharged from? [facility]   Does the patient have one of the following disease processes/diagnoses(primary or secondary)? COPD   Week 1 attempt successful? No  [Daughter, Elvi, states will alert patient to future f/u calls. States unsure regarding medications/appts. States patient doing well.]   Unsuccessful attempts Attempt 1            Carina Ritter Registered Nurse

## 2024-09-13 ENCOUNTER — READMISSION MANAGEMENT (OUTPATIENT)
Dept: CALL CENTER | Facility: HOSPITAL | Age: 69
End: 2024-09-13
Payer: MEDICARE

## 2024-09-13 NOTE — OUTREACH NOTE
COPD/PN Week 1 Survey      Flowsheet Row Responses   [employer] facility patient discharged from? [place]   Does the patient have one of the following disease processes/diagnoses(primary or secondary)? COPD   Week 1 attempt successful? No   Unsuccessful attempts Attempt 2            Sumi HOPKINS - Registered Nurse

## 2024-09-16 ENCOUNTER — READMISSION MANAGEMENT (OUTPATIENT)
Dept: CALL CENTER | Facility: HOSPITAL | Age: 69
End: 2024-09-16
Payer: MEDICARE

## 2024-10-14 ENCOUNTER — HOSPITAL ENCOUNTER (INPATIENT)
Facility: HOSPITAL | Age: 69
LOS: 3 days | Discharge: HOME | DRG: 190 | End: 2024-10-17
Attending: STUDENT IN AN ORGANIZED HEALTH CARE EDUCATION/TRAINING PROGRAM | Admitting: INTERNAL MEDICINE
Payer: MEDICARE

## 2024-10-14 ENCOUNTER — APPOINTMENT (OUTPATIENT)
Dept: GENERAL RADIOLOGY | Facility: HOSPITAL | Age: 69
DRG: 190 | End: 2024-10-14
Payer: MEDICARE

## 2024-10-14 DIAGNOSIS — J96.21 ACUTE ON CHRONIC RESPIRATORY FAILURE WITH HYPOXIA AND HYPERCAPNIA: ICD-10-CM

## 2024-10-14 DIAGNOSIS — J96.22 ACUTE ON CHRONIC RESPIRATORY FAILURE WITH HYPOXIA AND HYPERCAPNIA: ICD-10-CM

## 2024-10-14 DIAGNOSIS — J44.1 COPD WITH EXACERBATION: ICD-10-CM

## 2024-10-14 DIAGNOSIS — E87.29 RESPIRATORY ACIDOSIS: Primary | ICD-10-CM

## 2024-10-14 DIAGNOSIS — F17.210 CIGARETTE NICOTINE DEPENDENCE WITHOUT COMPLICATION: ICD-10-CM

## 2024-10-14 LAB
ALBUMIN SERPL-MCNC: 4 G/DL (ref 3.5–5.2)
ALBUMIN/GLOB SERPL: 1.6 G/DL
ALP SERPL-CCNC: 101 U/L (ref 39–117)
ALT SERPL W P-5'-P-CCNC: 8 U/L (ref 1–33)
ANION GAP SERPL CALCULATED.3IONS-SCNC: 4 MMOL/L (ref 5–15)
ARTERIAL PATENCY WRIST A: ABNORMAL
ARTERIAL PATENCY WRIST A: POSITIVE
AST SERPL-CCNC: 12 U/L (ref 1–32)
ATMOSPHERIC PRESS: ABNORMAL MM[HG]
ATMOSPHERIC PRESS: ABNORMAL MM[HG]
BASE EXCESS BLDA CALC-SCNC: 10.6 MMOL/L (ref 0–2)
BASE EXCESS BLDA CALC-SCNC: 10.8 MMOL/L (ref 0–2)
BASOPHILS # BLD AUTO: 0.05 10*3/MM3 (ref 0–0.2)
BASOPHILS NFR BLD AUTO: 0.6 % (ref 0–1.5)
BDY SITE: ABNORMAL
BDY SITE: ABNORMAL
BILIRUB SERPL-MCNC: 0.4 MG/DL (ref 0–1.2)
BILIRUB UR QL STRIP: NEGATIVE
BODY TEMPERATURE: 37
BODY TEMPERATURE: 37
BUN SERPL-MCNC: 7 MG/DL (ref 8–23)
BUN/CREAT SERPL: 9.6 (ref 7–25)
CALCIUM SPEC-SCNC: 8.9 MG/DL (ref 8.6–10.5)
CHLORIDE SERPL-SCNC: 95 MMOL/L (ref 98–107)
CLARITY UR: CLEAR
CO2 BLDA-SCNC: 42.7 MMOL/L (ref 22–33)
CO2 BLDA-SCNC: 43.8 MMOL/L (ref 22–33)
CO2 SERPL-SCNC: 41 MMOL/L (ref 22–29)
COHGB MFR BLD: 5.5 % (ref 0–2)
COHGB MFR BLD: 5.9 % (ref 0–2)
COLOR UR: YELLOW
CREAT SERPL-MCNC: 0.73 MG/DL (ref 0.57–1)
CRP SERPL-MCNC: 0.89 MG/DL (ref 0–0.5)
D DIMER PPP FEU-MCNC: 0.4 MCGFEU/ML (ref 0–0.69)
D-LACTATE SERPL-SCNC: 0.9 MMOL/L (ref 0.5–2)
DEPRECATED RDW RBC AUTO: 56.5 FL (ref 37–54)
EGFRCR SERPLBLD CKD-EPI 2021: 89.2 ML/MIN/1.73
EOSINOPHIL # BLD AUTO: 0.09 10*3/MM3 (ref 0–0.4)
EOSINOPHIL NFR BLD AUTO: 1.1 % (ref 0.3–6.2)
EPAP: 0
EPAP: 0
ERYTHROCYTE [DISTWIDTH] IN BLOOD BY AUTOMATED COUNT: 14.9 % (ref 12.3–15.4)
FLUAV RNA RESP QL NAA+PROBE: NOT DETECTED
FLUBV RNA RESP QL NAA+PROBE: NOT DETECTED
GLOBULIN UR ELPH-MCNC: 2.5 GM/DL
GLUCOSE SERPL-MCNC: 115 MG/DL (ref 65–99)
GLUCOSE UR STRIP-MCNC: NEGATIVE MG/DL
HCO3 BLDA-SCNC: 40.4 MMOL/L (ref 20–26)
HCO3 BLDA-SCNC: 41.3 MMOL/L (ref 20–26)
HCT VFR BLD AUTO: 49.1 % (ref 34–46.6)
HCT VFR BLD CALC: 46.1 % (ref 38–51)
HCT VFR BLD CALC: 46.1 % (ref 38–51)
HGB BLD-MCNC: 15 G/DL (ref 12–15.9)
HGB BLDA-MCNC: 15 G/DL (ref 14–18)
HGB BLDA-MCNC: 15.1 G/DL (ref 14–18)
HGB UR QL STRIP.AUTO: NEGATIVE
HOLD SPECIMEN: NORMAL
IMM GRANULOCYTES # BLD AUTO: 0.04 10*3/MM3 (ref 0–0.05)
IMM GRANULOCYTES NFR BLD AUTO: 0.5 % (ref 0–0.5)
INHALED O2 CONCENTRATION: 21 %
INHALED O2 CONCENTRATION: 30 %
IPAP: 0
IPAP: 0
KETONES UR QL STRIP: NEGATIVE
LEUKOCYTE ESTERASE UR QL STRIP.AUTO: NEGATIVE
LYMPHOCYTES # BLD AUTO: 1.37 10*3/MM3 (ref 0.7–3.1)
LYMPHOCYTES NFR BLD AUTO: 16.7 % (ref 19.6–45.3)
Lab: ABNORMAL
Lab: ABNORMAL
MAGNESIUM SERPL-MCNC: 1.7 MG/DL (ref 1.6–2.4)
MCH RBC QN AUTO: 31.4 PG (ref 26.6–33)
MCHC RBC AUTO-ENTMCNC: 30.5 G/DL (ref 31.5–35.7)
MCV RBC AUTO: 102.9 FL (ref 79–97)
METHGB BLD QL: 0.2 % (ref 0–1.5)
METHGB BLD QL: 0.2 % (ref 0–1.5)
MODALITY: ABNORMAL
MODALITY: ABNORMAL
MONOCYTES # BLD AUTO: 0.45 10*3/MM3 (ref 0.1–0.9)
MONOCYTES NFR BLD AUTO: 5.5 % (ref 5–12)
NEUTROPHILS NFR BLD AUTO: 6.19 10*3/MM3 (ref 1.7–7)
NEUTROPHILS NFR BLD AUTO: 75.6 % (ref 42.7–76)
NITRITE UR QL STRIP: NEGATIVE
NOTIFIED BY: ABNORMAL
NOTIFIED BY: ABNORMAL
NOTIFIED WHO: ABNORMAL
NOTIFIED WHO: ABNORMAL
NRBC BLD AUTO-RTO: 0 /100 WBC (ref 0–0.2)
NT-PROBNP SERPL-MCNC: 616.9 PG/ML (ref 0–900)
OXYHGB MFR BLDV: 82.7 % (ref 94–99)
OXYHGB MFR BLDV: 84.7 % (ref 94–99)
PAW @ PEAK INSP FLOW SETTING VENT: 0 CMH2O
PAW @ PEAK INSP FLOW SETTING VENT: 0 CMH2O
PCO2 BLDA: 76.9 MM HG (ref 35–45)
PCO2 BLDA: 82.9 MM HG (ref 35–45)
PCO2 TEMP ADJ BLD: 76.9 MM HG (ref 35–45)
PCO2 TEMP ADJ BLD: 82.9 MM HG (ref 35–45)
PEEP RESPIRATORY: 5 CM[H2O]
PH BLDA: 7.31 PH UNITS (ref 7.35–7.45)
PH BLDA: 7.33 PH UNITS (ref 7.35–7.45)
PH UR STRIP.AUTO: 6 [PH] (ref 5–8)
PH, TEMP CORRECTED: 7.31 PH UNITS
PH, TEMP CORRECTED: 7.33 PH UNITS
PLATELET # BLD AUTO: 155 10*3/MM3 (ref 140–450)
PMV BLD AUTO: 10.2 FL (ref 6–12)
PO2 BLDA: 55.8 MM HG (ref 83–108)
PO2 BLDA: 62.5 MM HG (ref 83–108)
PO2 TEMP ADJ BLD: 55.8 MM HG (ref 83–108)
PO2 TEMP ADJ BLD: 62.5 MM HG (ref 83–108)
POTASSIUM SERPL-SCNC: 4.5 MMOL/L (ref 3.5–5.2)
PROT SERPL-MCNC: 6.5 G/DL (ref 6–8.5)
PROT UR QL STRIP: NEGATIVE
RBC # BLD AUTO: 4.77 10*6/MM3 (ref 3.77–5.28)
RSV RNA RESP QL NAA+PROBE: NOT DETECTED
SARS-COV-2 RNA RESP QL NAA+PROBE: NOT DETECTED
SODIUM SERPL-SCNC: 140 MMOL/L (ref 136–145)
SP GR UR STRIP: 1.01 (ref 1–1.03)
TOTAL RATE: 0 BREATHS/MINUTE
TOTAL RATE: 0 BREATHS/MINUTE
TROPONIN T SERPL HS-MCNC: 9 NG/L
UROBILINOGEN UR QL STRIP: NORMAL
VENTILATOR MODE: ABNORMAL
WBC NRBC COR # BLD AUTO: 8.19 10*3/MM3 (ref 3.4–10.8)
WHOLE BLOOD HOLD COAG: NORMAL
WHOLE BLOOD HOLD SPECIMEN: NORMAL

## 2024-10-14 PROCEDURE — 81003 URINALYSIS AUTO W/O SCOPE: CPT | Performed by: STUDENT IN AN ORGANIZED HEALTH CARE EDUCATION/TRAINING PROGRAM

## 2024-10-14 PROCEDURE — 82375 ASSAY CARBOXYHB QUANT: CPT

## 2024-10-14 PROCEDURE — 80053 COMPREHEN METABOLIC PANEL: CPT | Performed by: STUDENT IN AN ORGANIZED HEALTH CARE EDUCATION/TRAINING PROGRAM

## 2024-10-14 PROCEDURE — 86140 C-REACTIVE PROTEIN: CPT | Performed by: STUDENT IN AN ORGANIZED HEALTH CARE EDUCATION/TRAINING PROGRAM

## 2024-10-14 PROCEDURE — 99223 1ST HOSP IP/OBS HIGH 75: CPT | Performed by: FAMILY MEDICINE

## 2024-10-14 PROCEDURE — 94660 CPAP INITIATION&MGMT: CPT

## 2024-10-14 PROCEDURE — 94664 DEMO&/EVAL PT USE INHALER: CPT

## 2024-10-14 PROCEDURE — 36600 WITHDRAWAL OF ARTERIAL BLOOD: CPT

## 2024-10-14 PROCEDURE — 25010000002 MAGNESIUM SULFATE 2 GM/50ML SOLUTION: Performed by: STUDENT IN AN ORGANIZED HEALTH CARE EDUCATION/TRAINING PROGRAM

## 2024-10-14 PROCEDURE — 83605 ASSAY OF LACTIC ACID: CPT | Performed by: STUDENT IN AN ORGANIZED HEALTH CARE EDUCATION/TRAINING PROGRAM

## 2024-10-14 PROCEDURE — 99291 CRITICAL CARE FIRST HOUR: CPT

## 2024-10-14 PROCEDURE — 85025 COMPLETE CBC W/AUTO DIFF WBC: CPT | Performed by: STUDENT IN AN ORGANIZED HEALTH CARE EDUCATION/TRAINING PROGRAM

## 2024-10-14 PROCEDURE — 94799 UNLISTED PULMONARY SVC/PX: CPT

## 2024-10-14 PROCEDURE — 93005 ELECTROCARDIOGRAM TRACING: CPT

## 2024-10-14 PROCEDURE — 83880 ASSAY OF NATRIURETIC PEPTIDE: CPT | Performed by: STUDENT IN AN ORGANIZED HEALTH CARE EDUCATION/TRAINING PROGRAM

## 2024-10-14 PROCEDURE — 25010000002 METHYLPREDNISOLONE PER 125 MG: Performed by: NURSE PRACTITIONER

## 2024-10-14 PROCEDURE — 83735 ASSAY OF MAGNESIUM: CPT | Performed by: STUDENT IN AN ORGANIZED HEALTH CARE EDUCATION/TRAINING PROGRAM

## 2024-10-14 PROCEDURE — 87040 BLOOD CULTURE FOR BACTERIA: CPT | Performed by: STUDENT IN AN ORGANIZED HEALTH CARE EDUCATION/TRAINING PROGRAM

## 2024-10-14 PROCEDURE — 25010000002 METHYLPREDNISOLONE PER 125 MG: Performed by: STUDENT IN AN ORGANIZED HEALTH CARE EDUCATION/TRAINING PROGRAM

## 2024-10-14 PROCEDURE — 94640 AIRWAY INHALATION TREATMENT: CPT

## 2024-10-14 PROCEDURE — 82805 BLOOD GASES W/O2 SATURATION: CPT

## 2024-10-14 PROCEDURE — 36415 COLL VENOUS BLD VENIPUNCTURE: CPT

## 2024-10-14 PROCEDURE — 71045 X-RAY EXAM CHEST 1 VIEW: CPT

## 2024-10-14 PROCEDURE — 85379 FIBRIN DEGRADATION QUANT: CPT | Performed by: STUDENT IN AN ORGANIZED HEALTH CARE EDUCATION/TRAINING PROGRAM

## 2024-10-14 PROCEDURE — 83050 HGB METHEMOGLOBIN QUAN: CPT

## 2024-10-14 PROCEDURE — 84484 ASSAY OF TROPONIN QUANT: CPT | Performed by: STUDENT IN AN ORGANIZED HEALTH CARE EDUCATION/TRAINING PROGRAM

## 2024-10-14 PROCEDURE — 87637 SARSCOV2&INF A&B&RSV AMP PRB: CPT | Performed by: STUDENT IN AN ORGANIZED HEALTH CARE EDUCATION/TRAINING PROGRAM

## 2024-10-14 PROCEDURE — 93005 ELECTROCARDIOGRAM TRACING: CPT | Performed by: STUDENT IN AN ORGANIZED HEALTH CARE EDUCATION/TRAINING PROGRAM

## 2024-10-14 RX ORDER — AZITHROMYCIN 250 MG/1
500 TABLET, FILM COATED ORAL
Status: COMPLETED | OUTPATIENT
Start: 2024-10-14 | End: 2024-10-16

## 2024-10-14 RX ORDER — NITROGLYCERIN 0.4 MG/1
0.4 TABLET SUBLINGUAL
Status: DISCONTINUED | OUTPATIENT
Start: 2024-10-14 | End: 2024-10-17 | Stop reason: HOSPADM

## 2024-10-14 RX ORDER — ACETAMINOPHEN 160 MG/5ML
650 SOLUTION ORAL EVERY 4 HOURS PRN
Status: DISCONTINUED | OUTPATIENT
Start: 2024-10-14 | End: 2024-10-17 | Stop reason: HOSPADM

## 2024-10-14 RX ORDER — TRAZODONE HYDROCHLORIDE 100 MG/1
100 TABLET ORAL NIGHTLY
Status: DISCONTINUED | OUTPATIENT
Start: 2024-10-14 | End: 2024-10-17 | Stop reason: HOSPADM

## 2024-10-14 RX ORDER — GABAPENTIN 400 MG/1
800 CAPSULE ORAL EVERY 8 HOURS SCHEDULED
Status: DISCONTINUED | OUTPATIENT
Start: 2024-10-14 | End: 2024-10-17 | Stop reason: HOSPADM

## 2024-10-14 RX ORDER — ACETAMINOPHEN 325 MG/1
650 TABLET ORAL EVERY 4 HOURS PRN
Status: DISCONTINUED | OUTPATIENT
Start: 2024-10-14 | End: 2024-10-17 | Stop reason: HOSPADM

## 2024-10-14 RX ORDER — WATER 10 ML/10ML
INJECTION INTRAMUSCULAR; INTRAVENOUS; SUBCUTANEOUS
Status: COMPLETED
Start: 2024-10-14 | End: 2024-10-14

## 2024-10-14 RX ORDER — HYDROCHLOROTHIAZIDE 25 MG/1
6.25 TABLET ORAL DAILY
Status: DISCONTINUED | OUTPATIENT
Start: 2024-10-15 | End: 2024-10-17 | Stop reason: HOSPADM

## 2024-10-14 RX ORDER — PROMETHAZINE HYDROCHLORIDE 12.5 MG/1
12.5 TABLET ORAL EVERY 6 HOURS PRN
Status: DISCONTINUED | OUTPATIENT
Start: 2024-10-14 | End: 2024-10-17 | Stop reason: HOSPADM

## 2024-10-14 RX ORDER — HYDROCHLOROTHIAZIDE 12.5 MG/1
12.5 TABLET ORAL DAILY
COMMUNITY
End: 2024-10-14

## 2024-10-14 RX ORDER — PROMETHAZINE HYDROCHLORIDE 12.5 MG/1
12.5 SUPPOSITORY RECTAL EVERY 6 HOURS PRN
Status: DISCONTINUED | OUTPATIENT
Start: 2024-10-14 | End: 2024-10-17 | Stop reason: HOSPADM

## 2024-10-14 RX ORDER — NICOTINE 21 MG/24HR
1 PATCH, TRANSDERMAL 24 HOURS TRANSDERMAL
Status: DISCONTINUED | OUTPATIENT
Start: 2024-10-14 | End: 2024-10-17 | Stop reason: HOSPADM

## 2024-10-14 RX ORDER — HYDROCHLOROTHIAZIDE 12.5 MG/1
12.5 TABLET ORAL EVERY MORNING
COMMUNITY
End: 2024-10-17 | Stop reason: HOSPADM

## 2024-10-14 RX ORDER — POLYETHYLENE GLYCOL 3350 17 G/17G
17 POWDER, FOR SOLUTION ORAL DAILY PRN
Status: DISCONTINUED | OUTPATIENT
Start: 2024-10-14 | End: 2024-10-17 | Stop reason: HOSPADM

## 2024-10-14 RX ORDER — SODIUM CHLORIDE 0.9 % (FLUSH) 0.9 %
10 SYRINGE (ML) INJECTION EVERY 12 HOURS SCHEDULED
Status: DISCONTINUED | OUTPATIENT
Start: 2024-10-14 | End: 2024-10-17 | Stop reason: HOSPADM

## 2024-10-14 RX ORDER — SODIUM CHLORIDE 0.9 % (FLUSH) 0.9 %
10 SYRINGE (ML) INJECTION AS NEEDED
Status: DISCONTINUED | OUTPATIENT
Start: 2024-10-14 | End: 2024-10-14

## 2024-10-14 RX ORDER — IPRATROPIUM BROMIDE AND ALBUTEROL SULFATE 2.5; .5 MG/3ML; MG/3ML
3 SOLUTION RESPIRATORY (INHALATION)
Status: DISCONTINUED | OUTPATIENT
Start: 2024-10-14 | End: 2024-10-17 | Stop reason: HOSPADM

## 2024-10-14 RX ORDER — SODIUM CHLORIDE 0.9 % (FLUSH) 0.9 %
10 SYRINGE (ML) INJECTION AS NEEDED
Status: DISCONTINUED | OUTPATIENT
Start: 2024-10-14 | End: 2024-10-17 | Stop reason: HOSPADM

## 2024-10-14 RX ORDER — AMOXICILLIN 250 MG
2 CAPSULE ORAL 2 TIMES DAILY PRN
Status: DISCONTINUED | OUTPATIENT
Start: 2024-10-14 | End: 2024-10-17 | Stop reason: HOSPADM

## 2024-10-14 RX ORDER — METHYLPREDNISOLONE SODIUM SUCCINATE 125 MG/2ML
60 INJECTION, POWDER, LYOPHILIZED, FOR SOLUTION INTRAMUSCULAR; INTRAVENOUS EVERY 12 HOURS
Status: COMPLETED | OUTPATIENT
Start: 2024-10-14 | End: 2024-10-15

## 2024-10-14 RX ORDER — BISACODYL 5 MG/1
5 TABLET, DELAYED RELEASE ORAL DAILY PRN
Status: DISCONTINUED | OUTPATIENT
Start: 2024-10-14 | End: 2024-10-17 | Stop reason: HOSPADM

## 2024-10-14 RX ORDER — BISOPROLOL FUMARATE 5 MG/1
10 TABLET, FILM COATED ORAL DAILY
Status: DISCONTINUED | OUTPATIENT
Start: 2024-10-15 | End: 2024-10-17 | Stop reason: HOSPADM

## 2024-10-14 RX ORDER — ARFORMOTEROL TARTRATE 15 UG/2ML
15 SOLUTION RESPIRATORY (INHALATION)
Status: DISCONTINUED | OUTPATIENT
Start: 2024-10-14 | End: 2024-10-17 | Stop reason: HOSPADM

## 2024-10-14 RX ORDER — METHYLPREDNISOLONE SODIUM SUCCINATE 125 MG/2ML
125 INJECTION, POWDER, LYOPHILIZED, FOR SOLUTION INTRAMUSCULAR; INTRAVENOUS ONCE
Status: COMPLETED | OUTPATIENT
Start: 2024-10-14 | End: 2024-10-14

## 2024-10-14 RX ORDER — ALBUTEROL SULFATE 90 UG/1
2 INHALANT RESPIRATORY (INHALATION)
Status: DISCONTINUED | OUTPATIENT
Start: 2024-10-14 | End: 2024-10-14

## 2024-10-14 RX ORDER — MAGNESIUM SULFATE HEPTAHYDRATE 40 MG/ML
2 INJECTION, SOLUTION INTRAVENOUS ONCE
Status: COMPLETED | OUTPATIENT
Start: 2024-10-14 | End: 2024-10-14

## 2024-10-14 RX ORDER — BISACODYL 10 MG
10 SUPPOSITORY, RECTAL RECTAL DAILY PRN
Status: DISCONTINUED | OUTPATIENT
Start: 2024-10-14 | End: 2024-10-17 | Stop reason: HOSPADM

## 2024-10-14 RX ORDER — CALCIUM CARBONATE 500 MG/1
2 TABLET, CHEWABLE ORAL 2 TIMES DAILY PRN
Status: DISCONTINUED | OUTPATIENT
Start: 2024-10-14 | End: 2024-10-17 | Stop reason: HOSPADM

## 2024-10-14 RX ORDER — IPRATROPIUM BROMIDE AND ALBUTEROL SULFATE 2.5; .5 MG/3ML; MG/3ML
3 SOLUTION RESPIRATORY (INHALATION) ONCE
Status: COMPLETED | OUTPATIENT
Start: 2024-10-14 | End: 2024-10-14

## 2024-10-14 RX ORDER — ACETAMINOPHEN 650 MG/1
650 SUPPOSITORY RECTAL EVERY 4 HOURS PRN
Status: DISCONTINUED | OUTPATIENT
Start: 2024-10-14 | End: 2024-10-17 | Stop reason: HOSPADM

## 2024-10-14 RX ORDER — PREDNISONE 20 MG/1
40 TABLET ORAL
Status: DISCONTINUED | OUTPATIENT
Start: 2024-10-16 | End: 2024-10-17 | Stop reason: HOSPADM

## 2024-10-14 RX ADMIN — METHYLPREDNISOLONE SODIUM SUCCINATE 125 MG: 125 INJECTION INTRAMUSCULAR; INTRAVENOUS at 13:36

## 2024-10-14 RX ADMIN — MAGNESIUM SULFATE HEPTAHYDRATE 2 G: 40 INJECTION, SOLUTION INTRAVENOUS at 13:36

## 2024-10-14 RX ADMIN — AZITHROMYCIN DIHYDRATE 500 MG: 250 TABLET ORAL at 18:35

## 2024-10-14 RX ADMIN — METHYLPREDNISOLONE SODIUM SUCCINATE 60 MG: 125 INJECTION, POWDER, LYOPHILIZED, FOR SOLUTION INTRAMUSCULAR; INTRAVENOUS at 20:34

## 2024-10-14 RX ADMIN — ALBUTEROL SULFATE 2 PUFF: 90 AEROSOL, METERED RESPIRATORY (INHALATION) at 19:22

## 2024-10-14 RX ADMIN — GABAPENTIN 800 MG: 400 CAPSULE ORAL at 20:31

## 2024-10-14 RX ADMIN — TRAZODONE HYDROCHLORIDE 100 MG: 100 TABLET ORAL at 20:30

## 2024-10-14 RX ADMIN — WATER 10 ML: 1 INJECTION INTRAMUSCULAR; INTRAVENOUS; SUBCUTANEOUS at 20:35

## 2024-10-14 RX ADMIN — IPRATROPIUM BROMIDE AND ALBUTEROL SULFATE 3 ML: 2.5; .5 SOLUTION RESPIRATORY (INHALATION) at 22:34

## 2024-10-14 RX ADMIN — NICOTINE 1 PATCH: 21 PATCH TRANSDERMAL at 18:34

## 2024-10-14 RX ADMIN — ARFORMOTEROL TARTRATE 15 MCG: 15 SOLUTION RESPIRATORY (INHALATION) at 19:22

## 2024-10-14 RX ADMIN — Medication 10 ML: at 20:37

## 2024-10-14 RX ADMIN — IPRATROPIUM BROMIDE AND ALBUTEROL SULFATE 3 ML: 2.5; .5 SOLUTION RESPIRATORY (INHALATION) at 13:43

## 2024-10-14 NOTE — H&P
[facility] Medicine Services  HISTORY AND PHYSICAL    Patient Name: Elvi Keita  : 1955  MRN: [id number]  Primary Care Physician: Maria Alejandra West APRN  Date of admission: 10/14/2024    Subjective   Subjective     Chief Complaint:  Shortness of air    HPI:  Elvi Keita is a 69 y.o. female with PMH significant for COPD, HTN who is chronically on 4 L oxygen and who continues to smoke presented to [facility] ED on 10/14/2024 for increased shortness of breath over the last couple weeks.  She was recently admitted on 2024 for same complaint and when she was discharged her oxygen was increased to 4 L .  She states she has a chronic productive cough.  Patient states she is compliant with her medications.  Follows with [facility] pulmonology.  Patient was slightly acidotic upon arrival and was put on BiPAP.  Her pH improved to 7.32.  Patient's chest x-ray was similar to her  chest x-ray.  Patient will be admitted to hospital medicine service for further evaluation and treatment.    Personal History     Past Medical History:   Diagnosis Date    COPD (chronic obstructive pulmonary disease)     Hypertension        Past Surgical History:   Procedure Laterality Date    LAPAROSCOPIC TUBAL LIGATION      TOE SURGERY         Family History:  family history includes COPD in her mother; No Known Problems in her father.     Social History:  reports that she has been smoking cigarettes. She started smoking about 53 years ago. She has a 53.8 pack-year smoking history. She does not have any smokeless tobacco history on file. She reports that she does not use drugs.  Social History     Social History Narrative    , lives in [place], 2 kids retired  and        Medications:  Umeclidinium-Vilanterol, albuterol sulfate HFA, bisoprolol-hydrochlorothiazide, gabapentin, hydroCHLOROthiazide, sertraline, and traZODone    Allergies   Allergen Reactions    Fish Oil Rash        Objective   Objective     Vital Signs:   Temp:  [98.2 °F (36.8 °C)] 98.2 °F (36.8 °C)  Heart Rate:  [0-69] 61  Resp:  [16-18] 18  BP: ()/(50-70) 122/70  Flow (L/min) (Oxygen Therapy):  [3] 3    Physical Exam   Constitutional: Alert, chronically ill-appearing female currently on BiPAP in NAD  ENT: Pink, moist mucous membranes   Respiratory: Nonlabored, symmetrical chest expansion, coarse breath sounds in the bases and very diminished bilaterally  Cardiovascular: RRR, no M/R/G  Gastrointestinal: Soft, NT, ND +BS  Musculoskeletal: FLOREZ; no LE edema bilaterally  Neurologic: Oriented x4, strength symmetric in all extremities, follows all commands, speech clear  Skin: Pale, no rashes on exposed skin  Psychiatric: Pleasant and cooperative; normal affect    Result Review:  I have personally reviewed the results from the time of this admission to 10/14/2024 16:58 EDT and agree with these findings:  [x]  Laboratory list / accordion  []  Microbiology  [x]  Radiology  [x]  EKG/Telemetry   []  Cardiology/Vascular   []  Pathology  []  Old records  []  Other:  Most notable findings include:     LAB RESULTS:      Lab 10/14/24  1238   WBC 8.19   HEMOGLOBIN 15.0   HEMATOCRIT 49.1*   PLATELETS 155   NEUTROS ABS 6.19   IMMATURE GRANS (ABS) 0.04   LYMPHS ABS 1.37   MONOS ABS 0.45   EOS ABS 0.09   .9*   CRP 0.89*   LACTATE 0.9   D DIMER QUANT 0.40         Lab 10/14/24  1238   SODIUM 140   POTASSIUM 4.5   CHLORIDE 95*   CO2 41.0*   ANION GAP 4.0*   BUN 7*   CREATININE 0.73   EGFR 89.2   GLUCOSE 115*   CALCIUM 8.9   MAGNESIUM 1.7         Lab 10/14/24  1238   TOTAL PROTEIN 6.5   ALBUMIN 4.0   GLOBULIN 2.5   ALT (SGPT) 8   AST (SGOT) 12   BILIRUBIN 0.4   ALK PHOS 101         Lab 10/14/24  1238   PROBNP 616.9   HSTROP T 9                 Lab 10/14/24  1542 10/14/24  1425   PH, ARTERIAL 7.328* 7.306*   PCO2, ARTERIAL 76.9* 82.9*   PO2 ART 55.8* 62.5*   FIO2 30 21   HCO3 ART 40.4* 41.3*   BASE EXCESS ART 10.6* 10.8*    CARBOXYHEMOGLOBIN 5.5* 5.9*     Brief Urine Lab Results  (Last result in the past 365 days)        Color   Clarity   Blood   Leuk Est   Nitrite   Protein   CREAT   Urine HCG        10/14/24 1518 Yellow   Clear   Negative   Negative   Negative   Negative                 Microbiology Results (last 10 days)       Procedure Component Value - Date/Time    COVID PRE-OP / PRE-PROCEDURE SCREENING ORDER (NO ISOLATION) - Swab, Nasopharynx [[id number]]  (Normal) Collected: 10/14/24 1323    Lab Status: Final result Specimen: Swab from Nasopharynx Updated: 10/14/24 1411    Narrative:      The following orders were created for panel order COVID PRE-OP / PRE-PROCEDURE SCREENING ORDER (NO ISOLATION) - Swab, Nasopharynx.  Procedure                               Abnormality         Status                     ---------                               -----------         ------                     COVID-19, FLU A/B, RSV P...[[id number]]  Normal              Final result                 Please view results for these tests on the individual orders.    COVID-19, FLU A/B, RSV PCR 1 HR TAT - Swab, Nasopharynx [[id number]]  (Normal) Collected: 10/14/24 1323    Lab Status: Final result Specimen: Swab from Nasopharynx Updated: 10/14/24 1411     COVID19 Not Detected     Influenza A PCR Not Detected     Influenza B PCR Not Detected     RSV, PCR Not Detected    Narrative:      Fact sheet for providers: https://www.fda.gov/media/167361/download    Fact sheet for patients: https://www.fda.gov/media/352464/download    Test performed by PCR.            XR Chest 1 View    Result Date: 10/14/2024  XR CHEST 1 VW Date of Exam: 10/14/2024 12:53 PM EDT Indication: SOA triage protocol Comparison: 9/2/2024 Findings: Cardiomediastinal silhouette is unremarkable.  No airspace disease, pneumothorax, nor pleural effusion. The lungs are emphysematous with chronic interstitial changes. No acute osseous abnormality identified.     Impression: Impression: Stable  appearance of the chest with emphysema and chronic interstitial changes. Electronically Signed: Claudia Romero MD  10/14/2024 1:24 PM EDT  Workstation ID: [id number]     Results for orders placed during the hospital encounter of 08/30/24    Adult Transthoracic Echo Complete W/ Cont if Necessary Per Protocol    Interpretation Summary    Left ventricular ejection fraction appears to be greater than 70%.    Left ventricular wall thickness is consistent with mild posterior asymmetric hypertrophy.    The right ventricular cavity is mild to moderately dilated.    The left atrial cavity is mildly dilated.    Left atrial volume is mildly increased.    The right atrial cavity is moderately  dilated.    Estimated right ventricular systolic pressure from tricuspid regurgitation is normal (<35 mmHg).      Assessment & Plan   Assessment & Plan       COPD with exacerbation    Chronic hypoxemic respiratory failure    Nicotine dependence    COPD exacerbation  Chronic hypoxic hypercapnic respiratory failure   Nicotine dependence  -Required BiPAP on admission. Recommend to continue BiPAP nightly and when sleeping during the day. Good candidate for Trilogy machine, recommend to discuss with CM tomorrow.   -Zithromax x 3 days  -IV Solu-Medrol x 3 days then switch to prednisone on 10/17/2024  -Dustin scheduled  -On [facility] outpatient, formulary sub here   -NRT  -Tobacco cessation education  -Consider Pulmonology consult pending improvement.     HTN  -Home meds      DVT prophylaxis:  Mechanical    CODE STATUS:    Level Of Support Discussed With: Patient  Code Status (Patient has no pulse and is not breathing): CPR (Attempt to Resuscitate)  Medical Interventions (Patient has pulse or is breathing): Full Support      Expected Discharge  Expected Discharge Date: 10/17/2024; Expected Discharge Time:       This note has been completed as part of a split-shared workflow.     Electronically signed by ELZA Negron, 10/14/24, 4:54 PM  EDT.        Attending   Admission Attestation       I have performed an independent face-to-face diagnostic evaluation including performing an independent physical examination.  I approve of the documented plan of care above that was reviewed and developed with the advanced practice clinician (APC) and take responsibility for that plan along with its associated risks.  I have updated the HPI as appropriate.    Brief HPI    Patient is a 69 year old F with PMHx COPD on 3-4L NC chronically, HTN who presents to ED today due to increasing SOA.  Patient was recently admitted to [facility] 8/30 through 9/3 for COPD exacerbation.  She was treated with IV Solu-Medrol, empiric Rocephin and doxycycline and DuoNebs.  She was discharged on prednisone taper.  She has not followed up with her primary pulmonologist.  Initial ABG in ED with respiratory acidosis with hypercapnia.  Patient placed on BiPAP with improvement.  Patient tells me that at home her O2 saturation on her home oxygen usually runs in the mid 80s.  Tells me she is claustrophobic and has difficulty tolerating the BiPAP masks.  She has never had a trilogy machine.  On exam, she is currently on 6 L nasal cannula.  Her O2 saturations drop when she is talking to the low 80s.  She recovers quickly to 88 to 91%.  He was able to ambulate to the bathroom and back to her bed.    Physical Exam:  Temp:  [98.1 °F (36.7 °C)-98.2 °F (36.8 °C)] 98.1 °F (36.7 °C)  Heart Rate:  [0-73] 73  Resp:  [16-20] 20  BP: ()/(50-70) 112/70  Flow (L/min) (Oxygen Therapy):  [3-6] 6    Constitutional: Awake, alert, NAD, chronically ill appearing   Eyes: PERRLA, sclerae anicteric, no conjunctival injection  HENT: NCAT, mucous membranes moist  Neck: Supple, no thyromegaly, no lymphadenopathy, trachea midline  Respiratory: diminished breath sounds in all lung fields, nonlabored respirations on 6L NC  Cardiovascular: RRR, no murmurs, rubs, or gallops, palpable pedal pulses  bilaterally  Gastrointestinal: Positive bowel sounds, soft, nontender, nondistended  Musculoskeletal: No bilateral ankle edema, no clubbing or cyanosis to extremities  Psychiatric: Appropriate affect, cooperative  Neurologic: Oriented x 3, strength symmetric in all extremities, Cranial Nerves grossly intact to confrontation, speech clear  Skin: No rashes   Result Review:  I have personally reviewed the results from the time of this admission to 10/14/2024 21:43 EDT and agree with these findings:  [x]  Laboratory list / accordion  [x]  Microbiology  [x]  Radiology  [x]  EKG/Telemetry   []  Cardiology/Vascular   []  Pathology  [x]  Old records  []  Other:    Assessment and Plan:  Admit to telemetry.  Continue IV Solu-Medrol.  Continue Zithromax, more for inflammatory effect.  Continue DuoNebs.  Sitter pulmonology consult pending improvement.  Consult case management for trilogy device.  See assessment and plan documented by APC above and updated/edited by me as appropriate.    Zakia Lane, DO  10/14/24

## 2024-10-14 NOTE — ED PROVIDER NOTES
EMERGENCY DEPARTMENT ENCOUNTER    Pt Name: Elvi Keita  MRN: [id number]  Pt :   1955  Room Number:    Date of encounter:  10/14/2024  PCP: Maria Alejandra West APRN  ED Provider: Raymon Cruz MD    Historian: Patient, daughter      HPI:  Chief Complaint: Shortness of breath, cough        Context: Elvi Keita is a 69-year-old woman who is on chronic oxygen for COPD who continues to smoke who presents for evaluation of worsening dyspnea over the last few weeks she says she was feeling better after she was hospitalized within about a week later started getting worse again she has chronic productive cough.  She knows of no sick contacts and denies fevers.  No other complaints at this time.       PAST MEDICAL HISTORY  Past Medical History:   Diagnosis Date    COPD (chronic obstructive pulmonary disease)     Hypertension          PAST SURGICAL HISTORY  Past Surgical History:   Procedure Laterality Date    LAPAROSCOPIC TUBAL LIGATION      TOE SURGERY           FAMILY HISTORY  Family History   Problem Relation Age of Onset    COPD Mother     No Known Problems Father          SOCIAL HISTORY  Social History     Socioeconomic History    Marital status:    Tobacco Use    Smoking status: Every Day     Current packs/day: 1.00     Average packs/day: 1 pack/day for 53.8 years (53.8 ttl pk-yrs)     Types: Cigarettes     Start date:    Vaping Use    Vaping status: Never Used   Substance and Sexual Activity    Drug use: Never    Sexual activity: Not Currently         ALLERGIES  Fish oil        REVIEW OF SYSTEMS  Review of Systems       All systems reviewed and negative except for those discussed in HPI.       PHYSICAL EXAM    I have reviewed the triage vital signs and nursing notes.    ED Triage Vitals [10/14/24 1224]   Temp Heart Rate Resp BP SpO2   98.2 °F (36.8 °C) 66 16 123/56 95 %      Temp src Heart Rate Source Patient Position BP Location FiO2 (%)   Oral Monitor Sitting Left arm --       Physical  Exam  GENERAL:   Appears acute on chronically ill  HENT: Nares patent.  EYES: No scleral icterus.  CV: Regular rhythm, regular rate.  RESPIRATORY: Wet cough, decreased air movement and expiratory wheezes in all lung fields  ABDOMEN: Soft, nontender  MUSCULOSKELETAL: No deformities.   NEURO: Alert, moves all extremities, follows commands.  SKIN: Warm, dry, no rash visualized.      LAB RESULTS  Recent Results (from the past 24 hours)   ECG 12 Lead ED Triage Standing Order; SOA    Collection Time: 10/14/24 12:30 PM   Result Value Ref Range    QT Interval 428 ms    QTC Interval 445 ms   Comprehensive Metabolic Panel    Collection Time: 10/14/24 12:38 PM    Specimen: Blood   Result Value Ref Range    Glucose 115 (H) 65 - 99 mg/dL    BUN 7 (L) 8 - 23 mg/dL    Creatinine 0.73 0.57 - 1.00 mg/dL    Sodium 140 136 - 145 mmol/L    Potassium 4.5 3.5 - 5.2 mmol/L    Chloride 95 (L) 98 - 107 mmol/L    CO2 41.0 (H) 22.0 - 29.0 mmol/L    Calcium 8.9 8.6 - 10.5 mg/dL    Total Protein 6.5 6.0 - 8.5 g/dL    Albumin 4.0 3.5 - 5.2 g/dL    ALT (SGPT) 8 1 - 33 U/L    AST (SGOT) 12 1 - 32 U/L    Alkaline Phosphatase 101 39 - 117 U/L    Total Bilirubin 0.4 0.0 - 1.2 mg/dL    Globulin 2.5 gm/dL    A/G Ratio 1.6 g/dL    BUN/Creatinine Ratio 9.6 7.0 - 25.0    Anion Gap 4.0 (L) 5.0 - 15.0 mmol/L    eGFR 89.2 >60.0 mL/min/1.73   BNP    Collection Time: 10/14/24 12:38 PM    Specimen: Blood   Result Value Ref Range    proBNP 616.9 0.0 - 900.0 pg/mL   Single High Sensitivity Troponin T    Collection Time: 10/14/24 12:38 PM    Specimen: Blood   Result Value Ref Range    HS Troponin T 9 <14 ng/L   Green Top (Gel)    Collection Time: 10/14/24 12:38 PM   Result Value Ref Range    Extra Tube Hold for add-ons.    Lavender Top    Collection Time: 10/14/24 12:38 PM   Result Value Ref Range    Extra Tube hold for add-on    Gold Top - SST    Collection Time: 10/14/24 12:38 PM   Result Value Ref Range    Extra Tube Hold for add-ons.    Gray Top    Collection  Time: 10/14/24 12:38 PM   Result Value Ref Range    Extra Tube Hold for add-ons.    Light Blue Top    Collection Time: 10/14/24 12:38 PM   Result Value Ref Range    Extra Tube Hold for add-ons.    CBC Auto Differential    Collection Time: 10/14/24 12:38 PM    Specimen: Blood   Result Value Ref Range    WBC 8.19 3.40 - 10.80 10*3/mm3    RBC 4.77 3.77 - 5.28 10*6/mm3    Hemoglobin 15.0 12.0 - 15.9 g/dL    Hematocrit 49.1 (H) 34.0 - 46.6 %    .9 (H) 79.0 - 97.0 fL    MCH 31.4 26.6 - 33.0 pg    MCHC 30.5 (L) 31.5 - 35.7 g/dL    RDW 14.9 12.3 - 15.4 %    RDW-SD 56.5 (H) 37.0 - 54.0 fl    MPV 10.2 6.0 - 12.0 fL    Platelets 155 140 - 450 10*3/mm3    Neutrophil % 75.6 42.7 - 76.0 %    Lymphocyte % 16.7 (L) 19.6 - 45.3 %    Monocyte % 5.5 5.0 - 12.0 %    Eosinophil % 1.1 0.3 - 6.2 %    Basophil % 0.6 0.0 - 1.5 %    Immature Grans % 0.5 0.0 - 0.5 %    Neutrophils, Absolute 6.19 1.70 - 7.00 10*3/mm3    Lymphocytes, Absolute 1.37 0.70 - 3.10 10*3/mm3    Monocytes, Absolute 0.45 0.10 - 0.90 10*3/mm3    Eosinophils, Absolute 0.09 0.00 - 0.40 10*3/mm3    Basophils, Absolute 0.05 0.00 - 0.20 10*3/mm3    Immature Grans, Absolute 0.04 0.00 - 0.05 10*3/mm3    nRBC 0.0 0.0 - 0.2 /100 WBC   D-dimer, Quantitative    Collection Time: 10/14/24 12:38 PM    Specimen: Blood   Result Value Ref Range    D-Dimer, Quantitative 0.40 0.00 - 0.69 MCGFEU/mL   Lactic Acid, Plasma    Collection Time: 10/14/24 12:38 PM    Specimen: Blood   Result Value Ref Range    Lactate 0.9 0.5 - 2.0 mmol/L   C-reactive Protein    Collection Time: 10/14/24 12:38 PM    Specimen: Blood   Result Value Ref Range    C-Reactive Protein 0.89 (H) 0.00 - 0.50 mg/dL   Magnesium    Collection Time: 10/14/24 12:38 PM    Specimen: Blood   Result Value Ref Range    Magnesium 1.7 1.6 - 2.4 mg/dL   COVID-19, FLU A/B, RSV PCR 1 HR TAT - Swab, Nasopharynx    Collection Time: 10/14/24  1:23 PM    Specimen: Nasopharynx; Swab   Result Value Ref Range    COVID19 Not Detected Not  Detected - Ref. Range    Influenza A PCR Not Detected Not Detected    Influenza B PCR Not Detected Not Detected    RSV, PCR Not Detected Not Detected   Blood Gas, Arterial With Co-Ox    Collection Time: 10/14/24  2:25 PM    Specimen: Arterial Blood   Result Value Ref Range    Site Right Radial     Santiago's Test N/A     pH, Arterial 7.306 (L) 7.350 - 7.450 pH units    pCO2, Arterial 82.9 (C) 35.0 - 45.0 mm Hg    pO2, Arterial 62.5 (L) 83.0 - 108.0 mm Hg    HCO3, Arterial 41.3 (H) 20.0 - 26.0 mmol/L    Base Excess, Arterial 10.8 (H) 0.0 - 2.0 mmol/L    Hemoglobin, Blood Gas 15.0 14 - 18 g/dL    Hematocrit, Blood Gas 46.1 38.0 - 51.0 %    Oxyhemoglobin 84.7 (L) 94 - 99 %    Methemoglobin 0.20 0.00 - 1.50 %    Carboxyhemoglobin 5.9 (H) 0 - 2 %    CO2 Content 43.8 (H) 22 - 33 mmol/L    Temperature 37.0     Barometric Pressure for Blood Gas      Modality Room Air     FIO2 21 %    Rate 0 Breaths/minute    PIP 0 cmH2O    IPAP 0     EPAP 0     Notified Who CHRISTINE CALABRESE     Notified By [id number]     Notified Time 10/14/2024 14:30     pH, Temp Corrected 7.306 pH Units    pCO2, Temperature Corrected 82.9 (H) 35 - 45 mm Hg    pO2, Temperature Corrected 62.5 (L) 83 - 108 mm Hg   Urinalysis With Microscopic If Indicated (No Culture) - Urine, Clean Catch    Collection Time: 10/14/24  3:18 PM    Specimen: Urine, Clean Catch   Result Value Ref Range    Color, UA Yellow Yellow, Straw    Appearance, UA Clear Clear    pH, UA 6.0 5.0 - 8.0    Specific Gravity, UA 1.010 1.001 - 1.030    Glucose, UA Negative Negative    Ketones, UA Negative Negative    Bilirubin, UA Negative Negative    Blood, UA Negative Negative    Protein, UA Negative Negative    Leuk Esterase, UA Negative Negative    Nitrite, UA Negative Negative    Urobilinogen, UA 0.2 E.U./dL 0.2 - 1.0 E.U./dL   Blood Gas, Arterial With Co-Ox    Collection Time: 10/14/24  3:42 PM    Specimen: Arterial Blood   Result Value Ref Range    Site Right Radial     Santiago's Test Positive     pH,  Arterial 7.328 (L) 7.350 - 7.450 pH units    pCO2, Arterial 76.9 (C) 35.0 - 45.0 mm Hg    pO2, Arterial 55.8 (L) 83.0 - 108.0 mm Hg    HCO3, Arterial 40.4 (H) 20.0 - 26.0 mmol/L    Base Excess, Arterial 10.6 (H) 0.0 - 2.0 mmol/L    Hemoglobin, Blood Gas 15.1 14 - 18 g/dL    Hematocrit, Blood Gas 46.1 38.0 - 51.0 %    Oxyhemoglobin 82.7 (L) 94 - 99 %    Methemoglobin 0.20 0.00 - 1.50 %    Carboxyhemoglobin 5.5 (H) 0 - 2 %    CO2 Content 42.7 (H) 22 - 33 mmol/L    Temperature 37.0     Barometric Pressure for Blood Gas      Modality BiPap     FIO2 30 %    Ventilator Mode BiPAP     Rate 0 Breaths/minute    PEEP 5.0     PIP 0 cmH2O    IPAP 0     EPAP 0     Notified [facility] CHRISTINE CALABRESE     Notified By [id number]     Notified Time 10/14/2024 15:47     pH, Temp Corrected 7.328 pH Units    pCO2, Temperature Corrected 76.9 (H) 35 - 45 mm Hg    pO2, Temperature Corrected 55.8 (L) 83 - 108 mm Hg       If labs were ordered, I independently reviewed the results and considered them in treating the patient.        RADIOLOGY  XR Chest 1 View    Result Date: 10/14/2024  XR CHEST 1 VW Date of Exam: 10/14/2024 12:53 PM EDT Indication: SOA triage protocol Comparison: 9/2/2024 Findings: Cardiomediastinal silhouette is unremarkable.  No airspace disease, pneumothorax, nor pleural effusion. The lungs are emphysematous with chronic interstitial changes. No acute osseous abnormality identified.     Impression: Stable appearance of the chest with emphysema and chronic interstitial changes. Electronically Signed: Claudia Romero MD  10/14/2024 1:24 PM EDT  Workstation ID: [id number]     I ordered and independently reviewed the above noted radiographic studies.      I viewed images of chest x-ray which showed emphysema changes but no acute pathology that I can appreciate per my independent interpretation.    See radiologist's dictation for official interpretation.        PROCEDURES    Procedures    ECG 12 Lead ED Triage Standing Order; SOA   Preliminary  Result   Test Reason : ED Triage Standing Order~   Blood Pressure :   */*   mmHG   Vent. Rate :  65 BPM     Atrial Rate :  65 BPM      P-R Int : 206 ms          QRS Dur :  92 ms       QT Int : 428 ms       P-R-T Axes :  76  34  35 degrees      QTc Int : 445 ms      Normal sinus rhythm   Normal ECG   When compared with ECG of 30-AUG-2024 09:43,   No significant change was found      Referred By: EDMD           Confirmed By:           MEDICATIONS GIVEN IN ER    Medications   sodium chloride 0.9 % flush 10 mL (has no administration in time range)   ipratropium-albuterol (DUO-NEB) nebulizer solution 3 mL (3 mL Nebulization Given 10/14/24 1343)   methylPREDNISolone sodium succinate (SOLU-Medrol) injection 125 mg (125 mg Intravenous Given 10/14/24 1336)   magnesium sulfate 2g/50 mL (PREMIX) infusion (0 g Intravenous Stopped 10/14/24 1417)         MEDICAL DECISION MAKING, PROGRESS, and CONSULTS    All labs, if obtained, have been independently reviewed by me.  All radiology studies, if obtained, have been reviewed by me and the radiologist dictating the report.  All EKG's, if obtained, have been independently viewed and interpreted by me/my attending physician.      Discussion below represents my analysis of pertinent findings related to patient's condition, differential diagnosis, treatment plan and final disposition.                         Differential diagnosis:    Acute respiratory failure, respiratory acidosis, hypoxia, COPD exacerbation, pneumonia, pulmonary embolism, heart failure, sepsis, anemia, electrolyte abnormality      Additional sources:    - Discussed/ obtained information from independent historians: Daughter    - External (non-ED) record review:  Chart review of recent hospitalization for COPD exacerbation and elevated creatinine that responded to IV fluid shows history of:  COPD with chronic hypoxemic respiratory failure and hypertension    - Chronic or social conditions impacting care: COPD, chronic  hypoxia, ongoing tobacco abuse    - Shared decision making: Patient/patient representative in complete agreement with current plans for evaluation and management.      Orders placed during this visit:  Orders Placed This Encounter   Procedures    COVID PRE-OP / PRE-PROCEDURE SCREENING ORDER (NO ISOLATION) - Swab, Nasopharynx    Blood Culture - Blood,    Blood Culture - Blood,    COVID-19, FLU A/B, RSV PCR 1 HR TAT - Swab, Nasopharynx    XR Chest 1 View    [place] Draw    Comprehensive Metabolic Panel    BNP    Single High Sensitivity Troponin T    CBC Auto Differential    Urinalysis With Microscopic If Indicated (No Culture) - Urine, Clean Catch    Blood Gas, Arterial -With Co-Ox Panel: Yes    D-dimer, Quantitative    Lactic Acid, Plasma    C-reactive Protein    Magnesium    Blood Gas, Arterial With Co-Ox    Blood Gas, Venous -With Co-Ox Panel: Yes    Blood Gas, Arterial With Co-Ox    NPO Diet NPO Type: Strict NPO    Undress & Gown    Continuous Pulse Oximetry    Vital Signs    Oxygen Therapy- Nasal Cannula; Titrate 1-6 LPM Per SpO2; 90 - 95%    NIPPV-IPPV / BIPAP / CPAP    ECG 12 Lead ED Triage Standing Order; SOA    Insert Peripheral IV    CBC & Differential    Green Top (Gel)    Lavender Top    Gold Top - SST    Gray Top    Light Blue Top         Additional orders considered but not ordered:      ED Course:    Consultants:      ED Course as of 10/14/24 1614   Mon Oct 14, 2024   1252 Chart review of recent hospitalization for COPD exacerbation and elevated creatinine that responded to IV fluid shows history of:  COPD with chronic hypoxemic respiratory failure and hypertension [CC]   1253 This is a 69-year-old woman who is on chronic oxygen for COPD who continues to smoke who presents for evaluation of worsening dyspnea over the last few weeks she says she was feeling better after she was hospitalized within about a week later started getting worse again she has chronic productive cough.  She knows of no sick  contacts and denies fevers.  No other complaints at this time. [CC]   1253 She arrived awake and alert she has productive sounding cough wet rhonchorous breath sounds and expiratory wheezes in all lung fields she does appear to be satting well on 3 L nasal cannula treating with nebulizer magnesium Solu-Medrol obtaining full respiratory infectious workup will reevaluate pending initial workup. [CC]   1610 Chest x-ray only showing emphysematous changes.  CBC reassuring nonactionable metabolic panel likewise no actionable items  D-dimer is negative  Negative troponin.  Initial blood gas concerning for acute on chronic hypercapnia with a mild respiratory acidosis [CC]   1611 BiPAP she is tolerating this well on minimal settings 12 over 5, 30%.  Repeat blood gas still mildly acidotic but trending in the right direction she remains well upon reevaluation I think she is stable for telemetry.  Have discussed with Dr. Lane for hospital admission. [CC]      ED Course User Index  [CC] Raymon Cruz MD     40 minutes of critical care provided. This time excludes other billable procedures. Time does include preparation of documents, medical consultations, review of old records, and direct bedside care. Patient is at high risk for life-threatening deterioration due to acute on chronic respiratory failure with hypoxia and hypercapnia with respiratory acidosis requiring positive pressure ventilation and multiple IV and nebulizer medication.           Shared Decision Making:  After my consideration of clinical presentation and any laboratory/radiology studies obtained, I discussed the findings with the patient/patient representative who is in agreement with the treatment plan and the final disposition.   Risks and benefits of discharge and/or observation/admission were discussed.       AS OF 16:14 EDT VITALS:    BP - 129/60  HR - 64  TEMP - 98.2 °F (36.8 °C) (Oral)  O2 SATS - (!) 86%                  DIAGNOSIS  Final  diagnoses:   Respiratory acidosis   Acute on chronic respiratory failure with hypoxia and hypercapnia   COPD with exacerbation   Cigarette nicotine dependence without complication         DISPOSITION  Admit      Please note that portions of this document were completed with voice recognition software.        Raymon Cruz MD  10/14/24 2411

## 2024-10-14 NOTE — ED NOTES
Elvi Keita    Nursing Report ED to Floor:  Mental status: aox4  Ambulatory status: ambulatory independently   Oxygen Therapy:  bipap, tolerates 4L NC  Cardiac Rhythm: NSR  Admitted from: home  Safety Concerns:  none  Social Issues: none  ED Room #:  21    ED Nurse Phone Extension - [phone] or may call [phone].      HPI:   Chief Complaint   Patient presents with    Shortness of Breath    Weakness - Generalized       Past Medical History:  Past Medical History:   Diagnosis Date    COPD (chronic obstructive pulmonary disease)     Hypertension         Past Surgical History:  Past Surgical History:   Procedure Laterality Date    LAPAROSCOPIC TUBAL LIGATION      TOE SURGERY          Admitting Doctor:   Zakia Lane DO    Consulting Provider(s):  Consults       No orders found from 9/15/2024 to 10/15/2024.             Admitting Diagnosis:   The primary encounter diagnosis was Respiratory acidosis. Diagnoses of Acute on chronic respiratory failure with hypoxia and hypercapnia, COPD with exacerbation, and Cigarette nicotine dependence without complication were also pertinent to this visit.    Most Recent Vitals:   Vitals:    10/14/24 1615 10/14/24 1620 10/14/24 1628 10/14/24 1630   BP:    122/70   BP Location:       Patient Position:       Pulse:  63 64 61   Resp:       Temp:       TempSrc:       SpO2: 95%  93% 93%   Weight:       Height:           Active LDAs/IV Access:   Lines, Drains & Airways       Active LDAs       Name Placement date Placement time Site Days    Peripheral IV 10/14/24 1324 Left Antecubital 10/14/24  1324  Antecubital  less than 1                    Labs (abnormal labs have a star):   Labs Reviewed   COMPREHENSIVE METABOLIC PANEL - Abnormal; Notable for the following components:       Result Value    Glucose 115 (*)     BUN 7 (*)     Chloride 95 (*)     CO2 41.0 (*)     Anion Gap 4.0 (*)     All other components within normal limits    Narrative:     GFR Normal >60  Chronic Kidney Disease <60  Kidney  Failure <15     CBC WITH AUTO DIFFERENTIAL - Abnormal; Notable for the following components:    Hematocrit 49.1 (*)     .9 (*)     MCHC 30.5 (*)     RDW-SD 56.5 (*)     Lymphocyte % 16.7 (*)     All other components within normal limits   C-REACTIVE PROTEIN - Abnormal; Notable for the following components:    C-Reactive Protein 0.89 (*)     All other components within normal limits   BLOOD GAS, ARTERIAL W/CO-OXIMETRY - Abnormal; Notable for the following components:    pH, Arterial 7.306 (*)     pCO2, Arterial 82.9 (*)     pO2, Arterial 62.5 (*)     HCO3, Arterial 41.3 (*)     Base Excess, Arterial 10.8 (*)     Oxyhemoglobin 84.7 (*)     Carboxyhemoglobin 5.9 (*)     CO2 Content 43.8 (*)     pCO2, Temperature Corrected 82.9 (*)     pO2, Temperature Corrected 62.5 (*)     All other components within normal limits   BLOOD GAS, ARTERIAL W/CO-OXIMETRY - Abnormal; Notable for the following components:    pH, Arterial 7.328 (*)     pCO2, Arterial 76.9 (*)     pO2, Arterial 55.8 (*)     HCO3, Arterial 40.4 (*)     Base Excess, Arterial 10.6 (*)     Oxyhemoglobin 82.7 (*)     Carboxyhemoglobin 5.5 (*)     CO2 Content 42.7 (*)     pCO2, Temperature Corrected 76.9 (*)     pO2, Temperature Corrected 55.8 (*)     All other components within normal limits   COVID-19/FLUA&B/RSV, NP SWAB IN TRANSPORT MEDIA 1 HR TAT - Normal    Narrative:     Fact sheet for providers: https://www.fda.gov/media/031503/download    Fact sheet for patients: https://www.fda.gov/media/771489/download    Test performed by PCR.   BNP (IN-HOUSE) - Normal    Narrative:     This assay is used as an aid in the diagnosis of individuals suspected of having heart failure. It can be used as an aid in the diagnosis of acute decompensated heart failure (ADHF) in patients presenting with signs and symptoms of ADHF to the emergency department (ED). In addition, NT-proBNP of <300 pg/mL indicates ADHF is not likely.    Age Range Result Interpretation  NT-proBNP  "Concentration (pg/mL:      <50             Positive            >450                   Gray                 300-450                    Negative             <300    50-75           Positive            >900                  Gray                300-900                  Negative            <300      >75             Positive            >1800                  Gray                300-1800                  Negative            <300   SINGLE HS TROPONIN T - Normal    Narrative:     High Sensitive Troponin T Reference Range:  <14.0 ng/L- Negative Female for AMI  <22.0 ng/L- Negative Male for AMI  >=14 - Abnormal Female indicating possible myocardial injury.  >=22 - Abnormal Male indicating possible myocardial injury.   Clinicians would have to utilize clinical acumen, EKG, Troponin, and serial changes to determine if it is an Acute Myocardial Infarction or myocardial injury due to an underlying chronic condition.        URINALYSIS W/ MICROSCOPIC IF INDICATED (NO CULTURE) - Normal    Narrative:     Urine microscopic not indicated.   D-DIMER, QUANTITATIVE - Normal    Narrative:     According to the assay 's published package insert, a normal (<0.50 MCGFEU/mL) D-dimer result in conjunction with a non-high clinical probability assessment, excludes deep vein thrombosis (DVT) and pulmonary embolism (PE) with high sensitivity.    D-dimer values increase with age and this can make VTE exclusion of an older population difficult. To address this, the [employer], based on best available evidence and recent guidelines, recommends that clinicians use age-adjusted D-dimer thresholds in patients greater than 50 years of age with: a) a low probability of PE who do not meet all Pulmonary Embolism Rule Out Criteria, or b) in those with intermediate probability of PE.   The formula for an age-adjusted D-dimer cut-off is \"age/100\".  For example, a 60 year old patient would have an age-adjusted cut-off of 0.60 " MCGFEU/mL and an 80 year old 0.80 MCGFEU/mL.   LACTIC ACID, PLASMA - Normal   MAGNESIUM - Normal   COVID PRE-OP / PRE-PROCEDURE SCREENING ORDER (NO ISOLATION)    Narrative:     The following orders were created for panel order COVID PRE-OP / PRE-PROCEDURE SCREENING ORDER (NO ISOLATION) - Swab, Nasopharynx.  Procedure                               Abnormality         Status                     ---------                               -----------         ------                     COVID-19, FLU A/B, RSV P...[[id number]]  Normal              Final result                 Please view results for these tests on the individual orders.   BLOOD CULTURE   BLOOD CULTURE   RAINBOW DRAW    Narrative:     The following orders were created for panel order Star Draw.  Procedure                               Abnormality         Status                     ---------                               -----------         ------                     Green Top (Gel)[[id number]]                                  Final result               Lavender Top[[id number]]                                     Final result               Gold Top - SST[[id number]]                                   Final result               Gray Top[[id number]]                                         Final result               Light Blue Top[[id number]]                                   Final result                 Please view results for these tests on the individual orders.   BLOOD GAS, ARTERIAL   BLOOD GAS, VENOUS   CBC AND DIFFERENTIAL    Narrative:     The following orders were created for panel order CBC & Differential.  Procedure                               Abnormality         Status                     ---------                               -----------         ------                     CBC Auto Differential[[id number]]        Abnormal            Final result                 Please view results for these tests on the individual orders.   GREEN TOP   LAVENDER TOP   GOLD  TOP - SST   GRAY TOP   LIGHT BLUE TOP       Meds Given in ED:   Medications   sodium chloride 0.9 % flush 10 mL (has no administration in time range)   ipratropium-albuterol (DUO-NEB) nebulizer solution 3 mL (3 mL Nebulization Given 10/14/24 1343)   methylPREDNISolone sodium succinate (SOLU-Medrol) injection 125 mg (125 mg Intravenous Given 10/14/24 1336)   magnesium sulfate 2g/50 mL (PREMIX) infusion (0 g Intravenous Stopped 10/14/24 1417)           Last NIH score:                                                          Dysphagia screening results:  Patient Factors Component (Dysphagia:Stroke or Rule-out)  Best Eye Response: 4-->(E4) spontaneous (10/14/24 1554)  Best Motor Response: 6-->(M6) obeys commands (10/14/24 1554)  Best Verbal Response: 5-->(V5) oriented (10/14/24 1554)  [place] Coma Scale Score: 15 (10/14/24 1554)     [place] Coma Scale:  No data recorded     CIWA:        Restraint Type:            Isolation Status:  No active isolations

## 2024-10-14 NOTE — Clinical Note
Level of Care: Telemetry [5]   Diagnosis: COPD with acute exacerbation [240308]   Admitting Physician: JUAN CARLOS RAMOS [[id number]]   Attending Physician: JUAN CARLOS RAMOS [[id number]]

## 2024-10-15 LAB
ANION GAP SERPL CALCULATED.3IONS-SCNC: 4 MMOL/L (ref 5–15)
BUN SERPL-MCNC: 9 MG/DL (ref 8–23)
BUN/CREAT SERPL: 13.8 (ref 7–25)
CALCIUM SPEC-SCNC: 8.7 MG/DL (ref 8.6–10.5)
CHLORIDE SERPL-SCNC: 98 MMOL/L (ref 98–107)
CO2 SERPL-SCNC: 39 MMOL/L (ref 22–29)
CREAT SERPL-MCNC: 0.65 MG/DL (ref 0.57–1)
DEPRECATED RDW RBC AUTO: 55.2 FL (ref 37–54)
EGFRCR SERPLBLD CKD-EPI 2021: 95.4 ML/MIN/1.73
ERYTHROCYTE [DISTWIDTH] IN BLOOD BY AUTOMATED COUNT: 14.8 % (ref 12.3–15.4)
GLUCOSE SERPL-MCNC: 128 MG/DL (ref 65–99)
HCT VFR BLD AUTO: 45.7 % (ref 34–46.6)
HGB BLD-MCNC: 13.9 G/DL (ref 12–15.9)
MCH RBC QN AUTO: 30.7 PG (ref 26.6–33)
MCHC RBC AUTO-ENTMCNC: 30.4 G/DL (ref 31.5–35.7)
MCV RBC AUTO: 100.9 FL (ref 79–97)
PLATELET # BLD AUTO: 141 10*3/MM3 (ref 140–450)
PMV BLD AUTO: 10.4 FL (ref 6–12)
POTASSIUM SERPL-SCNC: 4.3 MMOL/L (ref 3.5–5.2)
QT INTERVAL: 428 MS
QTC INTERVAL: 445 MS
RBC # BLD AUTO: 4.53 10*6/MM3 (ref 3.77–5.28)
SODIUM SERPL-SCNC: 141 MMOL/L (ref 136–145)
WBC NRBC COR # BLD AUTO: 4.79 10*3/MM3 (ref 3.4–10.8)

## 2024-10-15 PROCEDURE — 97166 OT EVAL MOD COMPLEX 45 MIN: CPT

## 2024-10-15 PROCEDURE — 85027 COMPLETE CBC AUTOMATED: CPT | Performed by: NURSE PRACTITIONER

## 2024-10-15 PROCEDURE — 94799 UNLISTED PULMONARY SVC/PX: CPT

## 2024-10-15 PROCEDURE — 97162 PT EVAL MOD COMPLEX 30 MIN: CPT

## 2024-10-15 PROCEDURE — 25010000002 HEPARIN (PORCINE) PER 1000 UNITS: Performed by: INTERNAL MEDICINE

## 2024-10-15 PROCEDURE — 99232 SBSQ HOSP IP/OBS MODERATE 35: CPT | Performed by: INTERNAL MEDICINE

## 2024-10-15 PROCEDURE — 97535 SELF CARE MNGMENT TRAINING: CPT

## 2024-10-15 PROCEDURE — 80048 BASIC METABOLIC PNL TOTAL CA: CPT | Performed by: NURSE PRACTITIONER

## 2024-10-15 PROCEDURE — 25010000002 METHYLPREDNISOLONE PER 125 MG: Performed by: NURSE PRACTITIONER

## 2024-10-15 RX ORDER — WATER 10 ML/10ML
INJECTION INTRAMUSCULAR; INTRAVENOUS; SUBCUTANEOUS
Status: COMPLETED
Start: 2024-10-15 | End: 2024-10-15

## 2024-10-15 RX ORDER — HEPARIN SODIUM 5000 [USP'U]/ML
5000 INJECTION, SOLUTION INTRAVENOUS; SUBCUTANEOUS EVERY 8 HOURS SCHEDULED
Status: DISCONTINUED | OUTPATIENT
Start: 2024-10-15 | End: 2024-10-17 | Stop reason: HOSPADM

## 2024-10-15 RX ORDER — ESOMEPRAZOLE MAGNESIUM 40 MG/1
40 CAPSULE, DELAYED RELEASE ORAL
COMMUNITY

## 2024-10-15 RX ORDER — PANTOPRAZOLE SODIUM 40 MG/1
40 TABLET, DELAYED RELEASE ORAL
Status: DISCONTINUED | OUTPATIENT
Start: 2024-10-15 | End: 2024-10-17 | Stop reason: HOSPADM

## 2024-10-15 RX ADMIN — NICOTINE 1 PATCH: 21 PATCH TRANSDERMAL at 08:53

## 2024-10-15 RX ADMIN — TIOTROPIUM BROMIDE INHALATION SPRAY 2 PUFF: 3.12 SPRAY, METERED RESPIRATORY (INHALATION) at 07:15

## 2024-10-15 RX ADMIN — IPRATROPIUM BROMIDE AND ALBUTEROL SULFATE 3 ML: 2.5; .5 SOLUTION RESPIRATORY (INHALATION) at 07:14

## 2024-10-15 RX ADMIN — Medication 10 ML: at 20:22

## 2024-10-15 RX ADMIN — HEPARIN SODIUM 5000 UNITS: 5000 INJECTION INTRAVENOUS; SUBCUTANEOUS at 20:23

## 2024-10-15 RX ADMIN — METHYLPREDNISOLONE SODIUM SUCCINATE 60 MG: 125 INJECTION, POWDER, LYOPHILIZED, FOR SOLUTION INTRAMUSCULAR; INTRAVENOUS at 08:46

## 2024-10-15 RX ADMIN — BISOPROLOL FUMARATE 10 MG: 5 TABLET ORAL at 08:45

## 2024-10-15 RX ADMIN — WATER 10 ML: 1 INJECTION INTRAMUSCULAR; INTRAVENOUS; SUBCUTANEOUS at 20:22

## 2024-10-15 RX ADMIN — METHYLPREDNISOLONE SODIUM SUCCINATE 60 MG: 125 INJECTION, POWDER, LYOPHILIZED, FOR SOLUTION INTRAMUSCULAR; INTRAVENOUS at 20:23

## 2024-10-15 RX ADMIN — ARFORMOTEROL TARTRATE 15 MCG: 15 SOLUTION RESPIRATORY (INHALATION) at 07:15

## 2024-10-15 RX ADMIN — ARFORMOTEROL TARTRATE 15 MCG: 15 SOLUTION RESPIRATORY (INHALATION) at 19:35

## 2024-10-15 RX ADMIN — PANTOPRAZOLE SODIUM 40 MG: 40 TABLET, DELAYED RELEASE ORAL at 20:23

## 2024-10-15 RX ADMIN — HEPARIN SODIUM 5000 UNITS: 5000 INJECTION INTRAVENOUS; SUBCUTANEOUS at 13:54

## 2024-10-15 RX ADMIN — HYDROCHLOROTHIAZIDE 6.25 MG: 25 TABLET ORAL at 08:45

## 2024-10-15 RX ADMIN — TRAZODONE HYDROCHLORIDE 100 MG: 100 TABLET ORAL at 20:23

## 2024-10-15 RX ADMIN — GABAPENTIN 800 MG: 400 CAPSULE ORAL at 05:36

## 2024-10-15 RX ADMIN — GABAPENTIN 800 MG: 400 CAPSULE ORAL at 20:22

## 2024-10-15 RX ADMIN — Medication 10 ML: at 08:53

## 2024-10-15 RX ADMIN — IPRATROPIUM BROMIDE AND ALBUTEROL SULFATE 3 ML: 2.5; .5 SOLUTION RESPIRATORY (INHALATION) at 15:45

## 2024-10-15 RX ADMIN — AZITHROMYCIN DIHYDRATE 500 MG: 250 TABLET ORAL at 08:46

## 2024-10-15 RX ADMIN — IPRATROPIUM BROMIDE AND ALBUTEROL SULFATE 3 ML: 2.5; .5 SOLUTION RESPIRATORY (INHALATION) at 19:35

## 2024-10-15 RX ADMIN — GABAPENTIN 800 MG: 400 CAPSULE ORAL at 13:54

## 2024-10-15 NOTE — PLAN OF CARE
Goal Outcome Evaluation:  Plan of Care Reviewed With: patient        Progress: no change  Outcome Evaluation: OT evaluation completed. The pt presents below her functional baseline with decreased activity tolerance, as well as minor strength and balance deficits. The pt ambulated to/from the bathroom with CGA, no AD. Pt's O2 dropped to 76% on 6L NC, however pt didn't appear overtly SOA. Pt was able to recover to 90% within 2 minutes. Nsg notified. LBD completed with set up, toileting and hand hygiene completed with SBA. Recommend a d/c home with assist and OP pulmonary rehab when medically ready.    Anticipated Discharge Disposition (OT): home with assist, other (see comments) (OP pulmonary rehab)

## 2024-10-15 NOTE — PROGRESS NOTES
[facility] Medicine Services  PROGRESS NOTE    Patient Name: Elvi Keita  : 1955  MRN: [id number]    Date of Admission: 10/14/2024  Primary Care Physician: Maria Alejandra West APRN    Subjective   Subjective     CC:  Dyspnea     HPI:  No acute events. States she feels about the same. Reviewed current plan.       Objective   Objective     Vital Signs:   Temp:  [97.5 °F (36.4 °C)-98.1 °F (36.7 °C)] 97.7 °F (36.5 °C)  Heart Rate:  [0-74] 71  Resp:  [16-20] 16  BP: ()/(50-74) 137/55  Flow (L/min) (Oxygen Therapy):  [3-6] 6     Physical Exam:  Constitutional: No acute distress, awake, alert  Respiratory: exp wheeze; increase work with breathing  Cardiovascular: RRR, no murmurs, rubs, or gallops  Gastrointestinal: Positive bowel sounds, soft, nontender, nondistended  Musculoskeletal: No bilateral ankle edema  Psychiatric: Appropriate affect, cooperative  Neurologic: Oriented x 3, strength symmetric in all extremities, Cranial Nerves grossly intact to confrontation, speech clear      Results Reviewed:  LAB RESULTS:      Lab 10/15/24  0350 10/14/24  1238   WBC 4.79 8.19   HEMOGLOBIN 13.9 15.0   HEMATOCRIT 45.7 49.1*   PLATELETS 141 155   NEUTROS ABS  --  6.19   IMMATURE GRANS (ABS)  --  0.04   LYMPHS ABS  --  1.37   MONOS ABS  --  0.45   EOS ABS  --  0.09   .9* 102.9*   CRP  --  0.89*   LACTATE  --  0.9   D DIMER QUANT  --  0.40   HSTROP T  --  9         Lab 10/15/24  0350 10/14/24  1238   SODIUM 141 140   POTASSIUM 4.3 4.5   CHLORIDE 98 95*   CO2 39.0* 41.0*   ANION GAP 4.0* 4.0*   BUN 9 7*   CREATININE 0.65 0.73   EGFR 95.4 89.2   GLUCOSE 128* 115*   CALCIUM 8.7 8.9   MAGNESIUM  --  1.7         Lab 10/14/24  1238   TOTAL PROTEIN 6.5   ALBUMIN 4.0   GLOBULIN 2.5   ALT (SGPT) 8   AST (SGOT) 12   BILIRUBIN 0.4   ALK PHOS 101         Lab 10/14/24  1238   PROBNP 616.9   HSTROP T 9                 Lab 10/14/24  1542 10/14/24  1425   PH, ARTERIAL 7.328* 7.306*   PCO2, ARTERIAL 76.9* 82.9*    PO2 ART 55.8* 62.5*   FIO2 30 21   HCO3 ART 40.4* 41.3*   BASE EXCESS ART 10.6* 10.8*   CARBOXYHEMOGLOBIN 5.5* 5.9*     Brief Urine Lab Results  (Last result in the past 365 days)        Color   Clarity   Blood   Leuk Est   Nitrite   Protein   CREAT   Urine HCG        10/14/24 1518 Yellow   Clear   Negative   Negative   Negative   Negative                   Microbiology Results Abnormal       Procedure Component Value - Date/Time    COVID PRE-OP / PRE-PROCEDURE SCREENING ORDER (NO ISOLATION) - Swab, Nasopharynx [[id number]]  (Normal) Collected: 10/14/24 1323    Lab Status: Final result Specimen: Swab from Nasopharynx Updated: 10/14/24 1411    Narrative:      The following orders were created for panel order COVID PRE-OP / PRE-PROCEDURE SCREENING ORDER (NO ISOLATION) - Swab, Nasopharynx.  Procedure                               Abnormality         Status                     ---------                               -----------         ------                     COVID-19, FLU A/B, RSV P...[[id number]]  Normal              Final result                 Please view results for these tests on the individual orders.    COVID-19, FLU A/B, RSV PCR 1 HR TAT - Swab, Nasopharynx [[id number]]  (Normal) Collected: 10/14/24 1323    Lab Status: Final result Specimen: Swab from Nasopharynx Updated: 10/14/24 1411     COVID19 Not Detected     Influenza A PCR Not Detected     Influenza B PCR Not Detected     RSV, PCR Not Detected    Narrative:      Fact sheet for providers: https://www.fda.gov/media/773549/download    Fact sheet for patients: https://www.fda.gov/media/619432/download    Test performed by PCR.            XR Chest 1 View    Result Date: 10/14/2024  XR CHEST 1 VW Date of Exam: 10/14/2024 12:53 PM EDT Indication: SOA triage protocol Comparison: 9/2/2024 Findings: Cardiomediastinal silhouette is unremarkable.  No airspace disease, pneumothorax, nor pleural effusion. The lungs are emphysematous with chronic interstitial  changes. No acute osseous abnormality identified.     Impression: Impression: Stable appearance of the chest with emphysema and chronic interstitial changes. Electronically Signed: Claudia Romero MD  10/14/2024 1:24 PM EDT  Workstation ID: [id number]     Results for orders placed during the hospital encounter of 08/30/24    Adult Transthoracic Echo Complete W/ Cont if Necessary Per Protocol    Interpretation Summary    Left ventricular ejection fraction appears to be greater than 70%.    Left ventricular wall thickness is consistent with mild posterior asymmetric hypertrophy.    The right ventricular cavity is mild to moderately dilated.    The left atrial cavity is mildly dilated.    Left atrial volume is mildly increased.    The right atrial cavity is moderately  dilated.    Estimated right ventricular systolic pressure from tricuspid regurgitation is normal (<35 mmHg).      Current medications:  Scheduled Meds:arformoterol, 15 mcg, Nebulization, BID - RT   And  tiotropium bromide monohydrate, 2 puff, Inhalation, Daily - RT  azithromycin, 500 mg, Oral, Q24H  bisoprolol, 10 mg, Oral, Daily   And  hydroCHLOROthiazide, 6.25 mg, Oral, Daily  gabapentin, 800 mg, Oral, Q8H  heparin (porcine), 5,000 Units, Subcutaneous, Q8H  ipratropium-albuterol, 3 mL, Nebulization, 4x Daily - RT  methylPREDNISolone sodium succinate, 60 mg, Intravenous, Q12H  nicotine, 1 patch, Transdermal, Q24H  [START ON 10/16/2024] predniSONE, 40 mg, Oral, Daily With Breakfast  sodium chloride, 10 mL, Intravenous, Q12H  traZODone, 100 mg, Oral, Nightly      Continuous Infusions:   PRN Meds:.  acetaminophen **OR** acetaminophen **OR** acetaminophen    senna-docusate sodium **AND** polyethylene glycol **AND** bisacodyl **AND** bisacodyl    calcium carbonate    Calcium Replacement - Follow Nurse / BPA Driven Protocol    Magnesium Standard Dose Replacement - Follow Nurse / BPA Driven Protocol    nicotine polacrilex    nitroglycerin    Phosphorus  Replacement - Follow Nurse / BPA Driven Protocol    Potassium Replacement - Follow Nurse / BPA Driven Protocol    promethazine **OR** promethazine    sodium chloride    Assessment & Plan   Assessment & Plan     Active Hospital Problems    Diagnosis  POA    **COPD with exacerbation [J44.1]  Yes    Chronic hypoxemic respiratory failure [J96.11]  Yes    Nicotine dependence [F17.200]  Yes      Resolved Hospital Problems   No resolved problems to display.        Brief Hospital Course to date:  Elvi Keita is a 69 y.o. female with PMH significant for COPD, HTN who is chronically on 4 L oxygen and who continues to smoke presented to [facility] ED on 10/14/2024 for increased shortness of breath over the last couple weeks.     COPD exacerbation  Chronic hypoxic hypercapnic respiratory failure   Nicotine dependence  -Required BiPAP on admission. Recommend to continue BiPAP nightly and when sleeping during the day. Good candidate for Trilogy machine -- CM consult   - ddimer negative  -Zithromax x 3 days  -IV Solu-Medrol x 3 days then switch to prednisone   -DuoNebs scheduled  - Continue home inhalers   -NRT  -Tobacco cessation education  -Consider Pulmonology consult pending improvement.   - Remains on 6L. Continue to wean as tolerated -- home of 3-4L     HTN  -Home meds     Expected Discharge Location and Transportation: TBD  Expected Discharge   Expected Discharge Date: 10/17/2024; Expected Discharge Time:      VTE Prophylaxis:  Pharmacologic & mechanical VTE prophylaxis orders are present.         AM-PAC 6 Clicks Score (PT): 23 (10/15/24 0800)    CODE STATUS:   Code Status and Medical Interventions: CPR (Attempt to Resuscitate); Full Support   Ordered at: 10/14/24 2026     Level Of Support Discussed With:    Patient     Code Status (Patient has no pulse and is not breathing):    CPR (Attempt to Resuscitate)     Medical Interventions (Patient has pulse or is breathing):    Full Support       Cherelle Lucas,   10/15/24

## 2024-10-15 NOTE — THERAPY EVALUATION
Patient Name: Elvi Keita  : 1955    MRN: [id number]                              Today's Date: 10/15/2024       Admit Date: 10/14/2024    Visit Dx:     ICD-10-CM ICD-9-CM   1. Respiratory acidosis  E87.29 276.2   2. Acute on chronic respiratory failure with hypoxia and hypercapnia  J96.21 518.84    J96.22 786.09     799.02   3. COPD with exacerbation  J44.1 491.21   4. Cigarette nicotine dependence without complication  F17.210 305.1     Patient Active Problem List   Diagnosis    COPD with exacerbation    Chronic hypoxemic respiratory failure    Cor pulmonale    Generalized osteoarthritis    Hyperlipidemia    Lumbar spondylosis    Nicotine dependence    Essential hypertension    Acute and chronic respiratory failure with hypoxia     Past Medical History:   Diagnosis Date    COPD (chronic obstructive pulmonary disease)     Hypertension      Past Surgical History:   Procedure Laterality Date    LAPAROSCOPIC TUBAL LIGATION      TOE SURGERY        General Information       Row Name 10/15/24 1105          OT Time and Intention    Document Type evaluation  -KF     Mode of Treatment occupational therapy  -       Row Name 10/15/24 1105          General Information    Patient Profile Reviewed yes  -KF     Prior Level of Function independent:;all household mobility;bed mobility;ADL's  Limited community ambulation; no AD, 3-4L NC  -KF     Existing Precautions/Restrictions fall;oxygen therapy device and L/min;other (see comments)  pt is quick to desat  -KF     Barriers to Rehab medically complex;previous functional deficit  -       Row Name 10/15/24 1105          Occupational Profile    Environmental Supports and Barriers (Occupational Profile) tub shower with seat available  -       Row Name 10/15/24 1105          Living Environment    People in Home spouse;grandchild(fani)  -       Row Name 10/15/24 1105          Home Main Entrance    Number of Stairs, Main Entrance five  -KF     Stair Railings, Main Entrance  railings safe and in good condition  -       Row Name 10/15/24 1105          Stairs Within Home, Primary    Number of Stairs, Within Home, Primary none  -       Row Name 10/15/24 1105          Cognition    Orientation Status (Cognition) oriented x 4  -       Row Name 10/15/24 1105          Safety Issues/Impairments Affecting Functional Mobility    Safety Issues Affecting Function (Mobility) awareness of need for assistance;insight into deficits/self-awareness;judgment;safety precaution awareness;safety precautions follow-through/compliance  -     Impairments Affecting Function (Mobility) balance;endurance/activity tolerance;shortness of breath;strength  -               User Key  (r) = Recorded By, (t) = Taken By, (c) = Cosigned By      Initials Name Provider Type    KF Charleen Cortez OT Occupational Therapist                     Mobility/ADL's       [facility] Name 10/15/24 1106          Bed Mobility    Bed Mobility supine-sit  -     Supine-Sit [place] (Bed Mobility) standby assist  -     Assistive Device (Bed Mobility) bed rails;head of bed elevated  -     Comment, (Bed Mobility) No physical assistance needed. Pt's O2 dropped to 85% on 4L NC during supine to sit transfer, recovering within 30 seconds with PLB.  -       Row Name 10/15/24 1106          Transfers    Transfers sit-stand transfer;stand-sit transfer;toilet transfer  -[facility] Name 10/15/24 1106          Sit-Stand Transfer    Sit-Stand [place] (Transfers) contact guard  -     Comment, (Sit-Stand Transfer) STS x1 from the EOB, x1 from the commode  -[facility] Name 10/15/24 1106          Stand-Sit Transfer    Stand-Sit [place] (Transfers) contact guard  -[facility] Name 10/15/24 1106          Toilet Transfer    Type (Toilet Transfer) sit-stand;stand-sit  -     [place] Level (Toilet Transfer) contact guard  -     Assistive Device (Toilet Transfer) commode;grab bars/safety frame  -       Row Name 10/15/24 1106           Functional Mobility    Functional Mobility- Ind. Level contact guard assist  -KF     Functional Mobility- Device other (see comments)  no AD  -KF     Functional Mobility-Distance (Feet) --  to/from the bathroom  -KF     Functional Mobility- Safety Issues supplemental O2  -KF     Functional Mobility- Comment Pt's O2 dropped to 76% on 6L NC during in room ambulation from the bathroom, however pt did not appear overly SOA. Pt recovered to 90% within 2 minutes. Nsg notified.  -KF     Patient was able to Ambulate yes  -       Row Name 10/15/24 1106          Activities of Daily Living    BADL Assessment/Intervention lower body dressing;grooming;toileting  -       Row Name 10/15/24 1106          Lower Body Dressing Assessment/Training    [place] Level (Lower Body Dressing) don;socks;standby assist  -     Position (Lower Body Dressing) sitting up in bed  -       Row Name 10/15/24 1106          Grooming Assessment/Training    [place] Level (Grooming) wash face, hands;set up;standby assist  -KF     Position (Grooming) sink side;unsupported standing  -       Row Name 10/15/24 1106          Toileting Assessment/Training    [place] Level (Toileting) adjust/manage clothing;perform perineal hygiene;standby assist  -     Assistive Devices (Toileting) commode  -     Position (Toileting) unsupported sitting;unsupported standing  -               User Key  (r) = Recorded By, (t) = Taken By, (c) = Cosigned By      Initials Name Provider Type    KF Charleen Cortez OT Occupational Therapist                   Obj/Interventions       Row Name 10/15/24 1108          Sensory Assessment (Somatosensory)    Sensory Assessment (Somatosensory) UE sensation intact  -       Row Name 10/15/24 1108          Range of Motion Comprehensive    General Range of Motion bilateral upper extremity ROM WFL  -       Row Name 10/15/24 1108          Strength Comprehensive (MMT)    General Manual Muscle Testing (MMT)  Assessment upper extremity strength deficits identified  -KF     Comment, General Manual Muscle Testing (MMT) Assessment BUE grossly 4+/5  -       Row Name 10/15/24 1108          Motor Skills    Motor Skills functional endurance  -KF     Functional Endurance below baseline  -       Row Name 10/15/24 1108          Balance    Balance Assessment sitting static balance;sitting dynamic balance;sit to stand dynamic balance;standing static balance;standing dynamic balance  -KF     Static Sitting Balance supervision  -KF     Dynamic Sitting Balance standby assist  -KF     Position, Sitting Balance unsupported;sitting edge of bed  -KF     Sit to Stand Dynamic Balance contact guard  -KF     Static Standing Balance standby assist  -KF     Dynamic Standing Balance contact guard  -KF     Position/Device Used, Standing Balance unsupported  -KF     Balance Interventions sitting;standing;sit to stand;static;dynamic;occupation based/functional task  -KF               User Key  (r) = Recorded By, (t) = Taken By, (c) = Cosigned By      Initials Name Provider Type    KF Charleen Cortez OT Occupational Therapist                   Goals/Plan       Row Name 10/15/24 1129          Transfer Goal 1 (OT)    Activity/Assistive Device (Transfer Goal 1, OT) commode;bed-to-chair/chair-to-bed  -KF     [place] Level/Cues Needed (Transfer Goal 1, OT) supervision required  -KF     Time Frame (Transfer Goal 1, OT) long term goal (LTG);10 days  -KF     Progress/Outcome (Transfer Goal 1, OT) goal ongoing  -       Row Name 10/15/24 1129          Dressing Goal 1 (OT)    Activity/Device (Dressing Goal 1, OT) upper body dressing;lower body dressing  -KF     [place]/Cues Needed (Dressing Goal 1, OT) supervision required  -KF     Time Frame (Dressing Goal 1, OT) short term goal (STG);5 days  -KF     Progress/Outcome (Dressing Goal 1, OT) goal revised this date  -       Row Name 10/15/24 1129          Grooming Goal 1 (OT)    Activity/Device  (Grooming Goal 1, OT) hair care;oral care;wash face, hands  -KF     [place] (Grooming Goal 1, OT) set-up required  -KF     Time Frame (Grooming Goal 1, OT) long term goal (LTG);10 days  -KF     Strategies/Barriers (Grooming Goal 1, OT) standing sink side with O2 >90%  -KF     Progress/Outcome (Grooming Goal 1, OT) goal ongoing  -KF       Row Name 10/15/24 1128          Therapy Assessment/Plan (OT)    Planned Therapy Interventions (OT) activity tolerance training;adaptive equipment training;BADL retraining;functional balance retraining;occupation/activity based interventions;patient/caregiver education/training;ROM/therapeutic exercise;strengthening exercise;transfer/mobility retraining  -KF               User Key  (r) = Recorded By, (t) = Taken By, (c) = Cosigned By      Initials Name Provider Type    Charleen Mancilla OT Occupational Therapist                   Clinical Impression       Row Name 10/15/24 1102          Pain Assessment    Pretreatment Pain Rating 0/10 - no pain  -KF     Posttreatment Pain Rating 0/10 - no pain  -KF       Row Name 10/15/24 110          Plan of Care Review    Plan of Care Reviewed With patient  -KF     Progress no change  -KF     Outcome Evaluation OT evaluation completed. The pt presents below her functional baseline with decreased activity tolerance, as well as minor strength and balance deficits. The pt ambulated to/from the bathroom with CGA, no AD. Pt's O2 dropped to 76% on 6L NC, however pt didn't appear overtly SOA. Pt was able to recover to 90% within 2 minutes. Nsg notified. LBD completed with set up, toileting and hand hygiene completed with SBA. Recommend a d/c home with assist and OP pulmonary rehab when medically ready.  -KF       Row Name 10/15/24 1103          Therapy Assessment/Plan (OT)    Patient/Family Therapy Goal Statement (OT) Restore PLOF  -KF     Rehab Potential (OT) good  -KF     Criteria for Skilled Therapeutic Interventions Met (OT) yes;skilled  treatment is necessary  -KF     Therapy Frequency (OT) daily  -KF     Predicted Duration of Therapy Intervention (OT) 10 days  -KF       Row Name 10/15/24 1109          Therapy Plan Review/Discharge Plan (OT)    Anticipated Discharge Disposition (OT) home with assist;other (see comments)  OP pulmonary rehab  -KF       Row Name 10/15/24 1109          Vital Signs    Pre Systolic BP Rehab 144  -KF     Pre Treatment Diastolic BP 68  -KF     Pretreatment Heart Rate (beats/min) 69  -KF     Pre SpO2 (%) 90  5L  -KF     O2 Delivery Pre Treatment nasal cannula  -KF     Intra SpO2 (%) 76   6L  -KF     O2 Delivery Intra Treatment nasal cannula  -KF     Post SpO2 (%) 91  6L  -KF     O2 Delivery Post Treatment nasal cannula  -KF     Pre Patient Position Supine  -KF     Intra Patient Position Standing  -KF     Post Patient Position Sitting  -KF       Row Name 10/15/24 1109          Positioning and Restraints    Pre-Treatment Position in bed  -KF     Post Treatment Position chair  -KF     In Chair notified nsg;call light within reach;encouraged to call for assist;exit alarm on;waffle cushion;sitting  -KF               User Key  (r) = Recorded By, (t) = Taken By, (c) = Cosigned By      Initials Name Provider Type    KF Charleen Cortez, OT Occupational Therapist                   Outcome Measures       Row Name 10/15/24 1129          How much help from another is currently needed...    Putting on and taking off regular lower body clothing? 3  -KF     Bathing (including washing, rinsing, and drying) 3  -KF     Toileting (which includes using toilet bed pan or urinal) 3  -KF     Putting on and taking off regular upper body clothing 3  -KF     Taking care of personal grooming (such as brushing teeth) 3  -KF     Eating meals 4  -KF     AM-PAC 6 Clicks Score (OT) 19  -KF       Row Name 10/15/24 0800 10/15/24 0750       How much help from another person do you currently need...    Turning from your back to your side while in flat bed  without using bedrails? 4  -SP 4  -NS    Moving from lying on back to sitting on the side of a flat bed without bedrails? 4  -SP 3  -NS    Moving to and from a bed to a chair (including a wheelchair)? 4  -SP 3  -NS    Standing up from a chair using your arms (e.g., wheelchair, bedside chair)? 4  -SP 3  -NS    Climbing 3-5 steps with a railing? 3  -SP 2  -NS    To walk in hospital room? 4  -SP 3  -NS    AM-PAC 6 Clicks Score (PT) 23  -SP 18  -NS    Highest Level of Mobility Goal 7 --> Walk 25 feet or more  -SP 6 --> Walk 10 steps or more  -NS      Row Name 10/15/24 1129 10/15/24 0750       Functional Assessment    Outcome Measure Options AM-PAC 6 Clicks Daily Activity (OT)  -KF AM-PAC 6 Clicks Basic Mobility (PT)  -NS              User Key  (r) = Recorded By, (t) = Taken By, (c) = Cosigned By      Initials Name Provider Type    Gema Nieves, PT Physical Therapist    Sakshi Booker, RN Registered Nurse    KF Charleen Cortez, OT Occupational Therapist                    Occupational Therapy Education       Title: PT OT SLP Therapies (In Progress)       Topic: Occupational Therapy (In Progress)       Point: ADL training (Done)       Description:   Instruct learner(s) on proper safety adaptation and remediation techniques during self care or transfers.   Instruct in proper use of assistive devices.                  Learning Progress Summary            Patient Acceptance, E,TB, VU,DU by KF at 10/15/2024 0755                      Point: Home exercise program (Not Started)       Description:   Instruct learner(s) on appropriate technique for monitoring, assisting and/or progressing therapeutic exercises/activities.                  Learner Progress:  Not documented in this visit.              Point: Precautions (Done)       Description:   Instruct learner(s) on prescribed precautions during self-care and functional transfers.                  Learning Progress Summary            Patient Acceptance, E,TB, VU,DU  by  at 10/15/2024 0755                      Point: Body mechanics (Done)       Description:   Instruct learner(s) on proper positioning and spine alignment during self-care, functional mobility activities and/or exercises.                  Learning Progress Summary            Patient Acceptance, E,TB, VU,DU by  at 10/15/2024 0755                                      User Key       Initials Effective Dates Name Provider Type Discipline     08/09/23 -  Charleen Cortez, ROGERIO Occupational Therapist OT                  OT Recommendation and Plan  Planned Therapy Interventions (OT): activity tolerance training, adaptive equipment training, BADL retraining, functional balance retraining, occupation/activity based interventions, patient/caregiver education/training, ROM/therapeutic exercise, strengthening exercise, transfer/mobility retraining  Therapy Frequency (OT): daily  Plan of Care Review  Plan of Care Reviewed With: patient  Progress: no change  Outcome Evaluation: OT evaluation completed. The pt presents below her functional baseline with decreased activity tolerance, as well as minor strength and balance deficits. The pt ambulated to/from the bathroom with CGA, no AD. Pt's O2 dropped to 76% on 6L NC, however pt didn't appear overtly SOA. Pt was able to recover to 90% within 2 minutes. Nsg notified. LBD completed with set up, toileting and hand hygiene completed with SBA. Recommend a d/c home with assist and OP pulmonary rehab when medically ready.     Time Calculation:   Evaluation Complexity (OT)  Review Occupational Profile/Medical/Therapy History Complexity: expanded/moderate complexity  Assessment, Occupational Performance/Identification of Deficit Complexity: 3-5 performance deficits  Clinical Decision Making Complexity (OT): detailed assessment/moderate complexity  Overall Complexity of Evaluation (OT): moderate complexity     Time Calculation- OT       Row Name 10/15/24 7201             Time Calculation-  OT    OT Start Time 0755  -KF      OT Received On 10/15/24  -KF      OT Goal Re-Cert Due Date 10/25/24  -KF         Timed Charges    13033 - OT Self Care/Mgmt Minutes 10  -KF         Untimed Charges    OT Eval/Re-eval Minutes 37  -KF         Total Minutes    Timed Charges Total Minutes 10  -KF      Untimed Charges Total Minutes 37  -KF       Total Minutes 47  -KF                User Key  (r) = Recorded By, (t) = Taken By, (c) = Cosigned By      Initials Name Provider Type    KF Charleen Cortez OT Occupational Therapist                  Therapy Charges for Today       Code Description Service Date Service Provider Modifiers Qty    [id number]  OT EVAL MOD COMPLEXITY 3 10/15/2024 Charleen Cortez OT GO 1    [id number]  OT SELF CARE/MGMT/TRAIN EA 15 MIN 10/15/2024 Charleen Cortez OT GO 1                 Charleen Cortez OT  10/15/2024

## 2024-10-15 NOTE — PLAN OF CARE
Goal Outcome Evaluation:      Pt A&Ox4. VSS. 6L NC with humidification, baseline is 3L. No complaints of pain or SOA. Standby assist. Pt in bed and alarm is set. Call light in reach. POC ongoing.

## 2024-10-15 NOTE — CASE MANAGEMENT/SOCIAL WORK
Discharge Planning Assessment  [facility]     Patient Name: Elvi Keita  MRN: [id number]  Today's Date: 10/15/2024    Admit Date: 10/14/2024    Plan: discharge plan   Discharge Needs Assessment       Row Name 10/15/24 1113       Living Environment    People in Home spouse    Name(s) of People in Home Govind Keita(spouse) and 15 y/o granddaughter    Current Living Arrangements home    Primary Care Provided by self    Provides Primary Care For grandchild(fani)  15 y/o granddaughter lives with pt    Family Caregiver if Needed child(fani), adult;spouse    Family Caregiver Names Margret Guy(spouse) and Govind Keita(spouse)    Quality of Family Relationships involved;helpful;supportive    Able to Return to Prior Arrangements yes    Living Arrangement Comments I met with pt at in room with permission regarding discharge plan. Pt sitting up in a chair. Pt resides in [facility] with spouse and thier 15y/o granddaughter lives with pt.       Transition Planning    Patient/Family Anticipates Transition to home with family    Patient/Family Anticipated Services at Transition     Transportation Anticipated family or friend will provide       Discharge Needs Assessment    Equipment Currently Used at Home oxygen;respiratory supplies  Pt reports she uses 3-4 L O2 cont provided by [facility]. Pt has a concentrator and portable O2 tanks    Equipment Needed After Discharge respiratory supplies;oxygen    Discharge Coordination/Progress Pt confirms that she has [employer] Medicare with prescription coverage and uses  [facility] on [facility] in [place]. Pt reports she is independent with ADLs and still drives occas. Pt uses no DME for mobility and no HH.  Her daughter, Margret is available for transportation and other assistance if needed. Pt is here with COPD and states she wears 3-4 L cont at home, she thinks through [facility]. Plan is home with family and currently denies discharge needs. CM will cont to  follow                   Discharge Plan       Row Name 10/15/24 1119       Plan    Plan discharge plan    Plan Comments . Pt is here with COPD and states she wears 3-4 L cont at home, provided by [employer]. Plan is home with family and currently denies discharge needs. CM will cont to follow    Final Discharge Disposition Code 01 - home or self-care                  Continued Care and Services - Admitted Since 10/14/2024    No active coordination exists for this encounter.       Expected Discharge Date and Time       Expected Discharge Date Expected Discharge Time    Oct 17, 2024            Demographic Summary       Row Name 10/15/24 1111       General Information    General Information Comments PCP is KASANDRA SCOTT       Contact Information    Permission Granted to Share Info With     Contact Information Obtained for     Contact Information Comments Margret Guy(daughter) [phone] or Govind Bossman(spouse) [phone]                   Functional Status    No documentation.                  Psychosocial    No documentation.                  Abuse/Neglect    No documentation.                  Legal    No documentation.                  Substance Abuse    No documentation.                  Patient Forms    No documentation.                     Xena Hemphill RN

## 2024-10-15 NOTE — PLAN OF CARE
Goal Outcome Evaluation:  Plan of Care Reviewed With: patient        Progress: no change   Patient is A&Ox4, VSS, on 6L humidified NC in the day and [place] at night, and NSR on monitor. Patient had no complaints or acute events overnight.

## 2024-10-15 NOTE — PAYOR COMM NOTE
"Bryn Keita (69 y.o. Female)     [id number]     Monae Shaver, FABIOLA  Utilization Review  [phone]  [phone]        Date of Birth   1955    Social Security Number       Address   [address] [place] [id number]    Home Phone   [phone]    MRN   [id number]       Roman Catholic   None    Marital Status                               Admission Date   10/14/24    Admission Type   Emergency    Admitting Provider   Cherelle Lucas DO    Attending Provider   Cherelle Lucas DO    Department, Room/Bed   [facility] 6A, N603/1       Discharge Date       Discharge Disposition       Discharge Destination                                 Attending Provider: Cherelle Lucas DO    Allergies: Fish Oil    Isolation: None   Infection: None   Code Status: CPR    Ht: 162.6 cm (64\")   Wt: 72.6 kg (160 lb)    Admission Cmt: None   Principal Problem: COPD with exacerbation [J44.1]                   Active Insurance as of 10/14/2024       Primary Coverage       Payor Plan Insurance Group Employer/Plan Group    ANTH MEDICARE REPLACEMENT ANTHEM MEDICARE ADVANTAGE [id number]       Payor Plan Address Payor Plan Phone Number Payor Plan Fax Number Effective Dates    [address] [phone]  2019 - None Entered    [facility] [id number]         Subscriber Name Subscriber Birth Date Member ID       BRYN KEITA 1955 [id number]                     Emergency Contacts        (Rel.) Home Phone Work Phone Mobile Phone    LAZARO WYLIEECCA (Daughter) -- -- [phone]    JESENIA KEITA (Spouse) [phone] -- --    BRYN WYLIE (Daughter) -- -- [phone]                 History & Physical        Zakia Lane DO at 10/14/24 [address] [place] [address] Medicine Services  HISTORY AND PHYSICAL    Patient Name: Bryn Keita  : 1955  MRN: [id number]  Primary Care Physician: Maria Alejandra West, APRN  Date of admission: " 10/14/2024    Subjective  Subjective     Chief Complaint:  Shortness of air    HPI:  Elvi Keita is a 69 y.o. female with PMH significant for COPD, HTN who is chronically on 4 L oxygen and who continues to smoke presented to [facility] ED on 10/14/2024 for increased shortness of breath over the last couple weeks.  She was recently admitted on 9/2/2024 for same complaint and when she was discharged her oxygen was increased to 4 L 24/7.  She states she has a chronic productive cough.  Patient states she is compliant with her medications.  Follows with [facility] pulmonology.  Patient was slightly acidotic upon arrival and was put on BiPAP.  Her pH improved to 7.32.  Patient's chest x-ray was similar to her 9/2 chest x-ray.  Patient will be admitted to hospital medicine service for further evaluation and treatment.    Personal History     Past Medical History:   Diagnosis Date    COPD (chronic obstructive pulmonary disease)     Hypertension        Past Surgical History:   Procedure Laterality Date    LAPAROSCOPIC TUBAL LIGATION      TOE SURGERY         Family History:  family history includes COPD in her mother; No Known Problems in her father.     Social History:  reports that she has been smoking cigarettes. She started smoking about 53 years ago. She has a 53.8 pack-year smoking history. She does not have any smokeless tobacco history on file. She reports that she does not use drugs.  Social History     Social History Narrative    , lives in [place], 2 kids retired  and        Medications:  Umeclidinium-Vilanterol, albuterol sulfate HFA, bisoprolol-hydrochlorothiazide, gabapentin, hydroCHLOROthiazide, sertraline, and traZODone    Allergies   Allergen Reactions    Fish Oil Rash       Objective  Objective     Vital Signs:   Temp:  [98.2 °F (36.8 °C)] 98.2 °F (36.8 °C)  Heart Rate:  [0-69] 61  Resp:  [16-18] 18  BP: ()/(50-70) 122/70  Flow (L/min) (Oxygen Therapy):  [3]  3    Physical Exam   Constitutional: Alert, chronically ill-appearing female currently on BiPAP in NAD  ENT: Pink, moist mucous membranes   Respiratory: Nonlabored, symmetrical chest expansion, coarse breath sounds in the bases and very diminished bilaterally  Cardiovascular: RRR, no M/R/G  Gastrointestinal: Soft, NT, ND +BS  Musculoskeletal: FLOREZ; no LE edema bilaterally  Neurologic: Oriented x4, strength symmetric in all extremities, follows all commands, speech clear  Skin: Pale, no rashes on exposed skin  Psychiatric: Pleasant and cooperative; normal affect    Result Review:  I have personally reviewed the results from the time of this admission to 10/14/2024 16:58 EDT and agree with these findings:  [x]  Laboratory list / accordion  []  Microbiology  [x]  Radiology  [x]  EKG/Telemetry   []  Cardiology/Vascular   []  Pathology  []  Old records  []  Other:  Most notable findings include:     LAB RESULTS:      Lab 10/14/24  1238   WBC 8.19   HEMOGLOBIN 15.0   HEMATOCRIT 49.1*   PLATELETS 155   NEUTROS ABS 6.19   IMMATURE GRANS (ABS) 0.04   LYMPHS ABS 1.37   MONOS ABS 0.45   EOS ABS 0.09   .9*   CRP 0.89*   LACTATE 0.9   D DIMER QUANT 0.40         Lab 10/14/24  1238   SODIUM 140   POTASSIUM 4.5   CHLORIDE 95*   CO2 41.0*   ANION GAP 4.0*   BUN 7*   CREATININE 0.73   EGFR 89.2   GLUCOSE 115*   CALCIUM 8.9   MAGNESIUM 1.7         Lab 10/14/24  1238   TOTAL PROTEIN 6.5   ALBUMIN 4.0   GLOBULIN 2.5   ALT (SGPT) 8   AST (SGOT) 12   BILIRUBIN 0.4   ALK PHOS 101         Lab 10/14/24  1238   PROBNP 616.9   HSTROP T 9                 Lab 10/14/24  1542 10/14/24  1425   PH, ARTERIAL 7.328* 7.306*   PCO2, ARTERIAL 76.9* 82.9*   PO2 ART 55.8* 62.5*   FIO2 30 21   HCO3 ART 40.4* 41.3*   BASE EXCESS ART 10.6* 10.8*   CARBOXYHEMOGLOBIN 5.5* 5.9*     Brief Urine Lab Results  (Last result in the past 365 days)        Color   Clarity   Blood   Leuk Est   Nitrite   Protein   CREAT   Urine HCG        10/14/24 1518 Yellow    Clear   Negative   Negative   Negative   Negative                 Microbiology Results (last 10 days)       Procedure Component Value - Date/Time    COVID PRE-OP / PRE-PROCEDURE SCREENING ORDER (NO ISOLATION) - Swab, Nasopharynx [[id number]]  (Normal) Collected: 10/14/24 1323    Lab Status: Final result Specimen: Swab from Nasopharynx Updated: 10/14/24 1411    Narrative:      The following orders were created for panel order COVID PRE-OP / PRE-PROCEDURE SCREENING ORDER (NO ISOLATION) - Swab, Nasopharynx.  Procedure                               Abnormality         Status                     ---------                               -----------         ------                     COVID-19, FLU A/B, RSV P...[[id number]]  Normal              Final result                 Please view results for these tests on the individual orders.    COVID-19, FLU A/B, RSV PCR 1 HR TAT - Swab, Nasopharynx [[id number]]  (Normal) Collected: 10/14/24 1323    Lab Status: Final result Specimen: Swab from Nasopharynx Updated: 10/14/24 1411     COVID19 Not Detected     Influenza A PCR Not Detected     Influenza B PCR Not Detected     RSV, PCR Not Detected    Narrative:      Fact sheet for providers: https://www.fda.gov/media/526805/download    Fact sheet for patients: https://www.fda.gov/media/935519/download    Test performed by PCR.            XR Chest 1 View    Result Date: 10/14/2024  XR CHEST 1 VW Date of Exam: 10/14/2024 12:53 PM EDT Indication: SOA triage protocol Comparison: 9/2/2024 Findings: Cardiomediastinal silhouette is unremarkable.  No airspace disease, pneumothorax, nor pleural effusion. The lungs are emphysematous with chronic interstitial changes. No acute osseous abnormality identified.     Impression: Impression: Stable appearance of the chest with emphysema and chronic interstitial changes. Electronically Signed: Claudia Romero MD  10/14/2024 1:24 PM EDT  Workstation ID: [id number]     Results for orders placed during the  hospital encounter of 08/30/24    Adult Transthoracic Echo Complete W/ Cont if Necessary Per Protocol    Interpretation Summary    Left ventricular ejection fraction appears to be greater than 70%.    Left ventricular wall thickness is consistent with mild posterior asymmetric hypertrophy.    The right ventricular cavity is mild to moderately dilated.    The left atrial cavity is mildly dilated.    Left atrial volume is mildly increased.    The right atrial cavity is moderately  dilated.    Estimated right ventricular systolic pressure from tricuspid regurgitation is normal (<35 mmHg).      Assessment & Plan  Assessment & Plan       COPD with exacerbation    Chronic hypoxemic respiratory failure    Nicotine dependence    COPD exacerbation  Chronic hypoxic hypercapnic respiratory failure   Nicotine dependence  -Required BiPAP on admission. Recommend to continue BiPAP nightly and when sleeping during the day. Good candidate for Trilogy machine, recommend to discuss with CM tomorrow.   -Zithromax x 3 days  -IV Solu-Medrol x 3 days then switch to prednisone on 10/17/2024  -Dustin scheduled  -On [facility] outpatient, formulary sub here   -NRT  -Tobacco cessation education  -Consider Pulmonology consult pending improvement.     HTN  -Home meds      DVT prophylaxis:  Mechanical    CODE STATUS:    Level Of Support Discussed With: Patient  Code Status (Patient has no pulse and is not breathing): CPR (Attempt to Resuscitate)  Medical Interventions (Patient has pulse or is breathing): Full Support      Expected Discharge  Expected Discharge Date: 10/17/2024; Expected Discharge Time:       This note has been completed as part of a split-shared workflow.     Electronically signed by ELZA Negron, 10/14/24, 4:54 PM EDT.        Attending   Admission Attestation       I have performed an independent face-to-face diagnostic evaluation including performing an independent physical examination.  I approve of the documented plan of  care above that was reviewed and developed with the advanced practice clinician (APC) and take responsibility for that plan along with its associated risks.  I have updated the HPI as appropriate.    Brief HPI    Patient is a 69 year old F with PMHx COPD on 3-4L NC chronically, HTN who presents to ED today due to increasing SOA.  Patient was recently admitted to [facility] through 9[facility]3 for COPD exacerbation.  She was treated with IV Solu-Medrol, empiric Rocephin and doxycycline and DuoNebs.  She was discharged on prednisone taper.  She has not followed up with her primary pulmonologist.  Initial ABG in ED with respiratory acidosis with hypercapnia.  Patient placed on BiPAP with improvement.  Patient tells me that at home her O2 saturation on her home oxygen usually runs in the mid 80s.  Tells me she is claustrophobic and has difficulty tolerating the BiPAP masks.  She has never had a trilogy machine.  On exam, she is currently on 6 L nasal cannula.  Her O2 saturations drop when she is talking to the low 80s.  She recovers quickly to 88 to 91%.  He was able to ambulate to the bathroom and back to her bed.    Physical Exam:  Temp:  [98.1 °F (36.7 °C)-98.2 °F (36.8 °C)] 98.1 °F (36.7 °C)  Heart Rate:  [0-73] 73  Resp:  [16-20] 20  BP: ()/(50-70) 112/70  Flow (L/min) (Oxygen Therapy):  [3-6] 6    Constitutional: Awake, alert, NAD, chronically ill appearing   Eyes: PERRLA, sclerae anicteric, no conjunctival injection  HENT: NCAT, mucous membranes moist  Neck: Supple, no thyromegaly, no lymphadenopathy, trachea midline  Respiratory: diminished breath sounds in all lung fields, nonlabored respirations on 6L NC  Cardiovascular: RRR, no murmurs, rubs, or gallops, palpable pedal pulses bilaterally  Gastrointestinal: Positive bowel sounds, soft, nontender, nondistended  Musculoskeletal: No bilateral ankle edema, no clubbing or cyanosis to extremities  Psychiatric: Appropriate affect,  cooperative  Neurologic: Oriented x 3, strength symmetric in all extremities, Cranial Nerves grossly intact to confrontation, speech clear  Skin: No rashes   Result Review:  I have personally reviewed the results from the time of this admission to 10/14/2024 21:43 EDT and agree with these findings:  [x]  Laboratory list / accordion  [x]  Microbiology  [x]  Radiology  [x]  EKG/Telemetry   []  Cardiology/Vascular   []  Pathology  [x]  Old records  []  Other:    Assessment and Plan:  Admit to telemetry.  Continue IV Solu-Medrol.  Continue Zithromax, more for inflammatory effect.  Continue DuoNebs.  Sitter pulmonology consult pending improvement.  Consult case management for trilogy device.  See assessment and plan documented by APC above and updated/edited by me as appropriate.    Zakia Lane DO  10/14/24                       Electronically signed by Zakia Lane DO at 10/14/24 1100          Emergency Department Notes        Theresa Xie RN at 10/14/24 1643           Elvi Keita    Nursing Report ED to Floor:  Mental status: aox4  Ambulatory status: ambulatory independently   Oxygen Therapy:  bipap, tolerates 4L NC  Cardiac Rhythm: NSR  Admitted from: home  Safety Concerns:  none  Social Issues: none  ED Room #:  21    ED Nurse Phone Extension - [phone] or may call [phone].      HPI:   Chief Complaint   Patient presents with    Shortness of Breath    Weakness - Generalized       Past Medical History:  Past Medical History:   Diagnosis Date    COPD (chronic obstructive pulmonary disease)     Hypertension         Past Surgical History:  Past Surgical History:   Procedure Laterality Date    LAPAROSCOPIC TUBAL LIGATION      TOE SURGERY          Admitting Doctor:   Zakia Lane DO    Consulting Provider(s):  Consults       No orders found from 9/15/2024 to 10/15/2024.             Admitting Diagnosis:   The primary encounter diagnosis was Respiratory acidosis. Diagnoses of Acute on chronic respiratory  failure with hypoxia and hypercapnia, COPD with exacerbation, and Cigarette nicotine dependence without complication were also pertinent to this visit.    Most Recent Vitals:   Vitals:    10/14/24 1615 10/14/24 1620 10/14/24 1628 10/14/24 1630   BP:    122/70   BP Location:       Patient Position:       Pulse:  63 64 61   Resp:       Temp:       TempSrc:       SpO2: 95%  93% 93%   Weight:       Height:           Active LDAs/IV Access:   Lines, Drains & Airways       Active LDAs       Name Placement date Placement time Site Days    Peripheral IV 10/14/24 1324 Left Antecubital 10/14/24  1324  Antecubital  less than 1                    Labs (abnormal labs have a star):   Labs Reviewed   COMPREHENSIVE METABOLIC PANEL - Abnormal; Notable for the following components:       Result Value    Glucose 115 (*)     BUN 7 (*)     Chloride 95 (*)     CO2 41.0 (*)     Anion Gap 4.0 (*)     All other components within normal limits    Narrative:     GFR Normal >60  Chronic Kidney Disease <60  Kidney Failure <15     CBC WITH AUTO DIFFERENTIAL - Abnormal; Notable for the following components:    Hematocrit 49.1 (*)     .9 (*)     MCHC 30.5 (*)     RDW-SD 56.5 (*)     Lymphocyte % 16.7 (*)     All other components within normal limits   C-REACTIVE PROTEIN - Abnormal; Notable for the following components:    C-Reactive Protein 0.89 (*)     All other components within normal limits   BLOOD GAS, ARTERIAL W/CO-OXIMETRY - Abnormal; Notable for the following components:    pH, Arterial 7.306 (*)     pCO2, Arterial 82.9 (*)     pO2, Arterial 62.5 (*)     HCO3, Arterial 41.3 (*)     Base Excess, Arterial 10.8 (*)     Oxyhemoglobin 84.7 (*)     Carboxyhemoglobin 5.9 (*)     CO2 Content 43.8 (*)     pCO2, Temperature Corrected 82.9 (*)     pO2, Temperature Corrected 62.5 (*)     All other components within normal limits   BLOOD GAS, ARTERIAL W/CO-OXIMETRY - Abnormal; Notable for the following components:    pH, Arterial 7.328 (*)      pCO2, Arterial 76.9 (*)     pO2, Arterial 55.8 (*)     HCO3, Arterial 40.4 (*)     Base Excess, Arterial 10.6 (*)     Oxyhemoglobin 82.7 (*)     Carboxyhemoglobin 5.5 (*)     CO2 Content 42.7 (*)     pCO2, Temperature Corrected 76.9 (*)     pO2, Temperature Corrected 55.8 (*)     All other components within normal limits   COVID-19/FLUA&B/RSV, NP SWAB IN TRANSPORT MEDIA 1 HR TAT - Normal    Narrative:     Fact sheet for providers: https://www.fda.gov/media/476901/download    Fact sheet for patients: https://www.fda.gov/media/468580/download    Test performed by PCR.   BNP (IN-HOUSE) - Normal    Narrative:     This assay is used as an aid in the diagnosis of individuals suspected of having heart failure. It can be used as an aid in the diagnosis of acute decompensated heart failure (ADHF) in patients presenting with signs and symptoms of ADHF to the emergency department (ED). In addition, NT-proBNP of <300 pg/mL indicates ADHF is not likely.    Age Range Result Interpretation  NT-proBNP Concentration (pg/mL:      <50             Positive            >450                   Gray                 300-450                    Negative             <300    50-75           Positive            >900                  Gray                300-900                  Negative            <300      >75             Positive            >1800                  Gray                300-1800                  Negative            <300   SINGLE HS TROPONIN T - Normal    Narrative:     High Sensitive Troponin T Reference Range:  <14.0 ng/L- Negative Female for AMI  <22.0 ng/L- Negative Male for AMI  >=14 - Abnormal Female indicating possible myocardial injury.  >=22 - Abnormal Male indicating possible myocardial injury.   Clinicians would have to utilize clinical acumen, EKG, Troponin, and serial changes to determine if it is an Acute Myocardial Infarction or myocardial injury due to an underlying chronic condition.        URINALYSIS W/ MICROSCOPIC  "IF INDICATED (NO CULTURE) - Normal    Narrative:     Urine microscopic not indicated.   D-DIMER, QUANTITATIVE - Normal    Narrative:     According to the assay 's published package insert, a normal (<0.50 MCGFEU/mL) D-dimer result in conjunction with a non-high clinical probability assessment, excludes deep vein thrombosis (DVT) and pulmonary embolism (PE) with high sensitivity.    D-dimer values increase with age and this can make VTE exclusion of an older population difficult. To address this, the American College of Physicians, based on best available evidence and recent guidelines, recommends that clinicians use age-adjusted D-dimer thresholds in patients greater than 50 years of age with: a) a low probability of PE who do not meet all Pulmonary Embolism Rule Out Criteria, or b) in those with intermediate probability of PE.   The formula for an age-adjusted D-dimer cut-off is \"age/100\".  For example, a 60 year old patient would have an age-adjusted cut-off of 0.60 MCGFEU/mL and an 80 year old 0.80 MCGFEU/mL.   LACTIC ACID, PLASMA - Normal   MAGNESIUM - Normal   COVID PRE-OP / PRE-PROCEDURE SCREENING ORDER (NO ISOLATION)    Narrative:     The following orders were created for panel order COVID PRE-OP / PRE-PROCEDURE SCREENING ORDER (NO ISOLATION) - Swab, Nasopharynx.  Procedure                               Abnormality         Status                     ---------                               -----------         ------                     COVID-19, FLU A/B, RSV P...[[id number]]  Normal              Final result                 Please view results for these tests on the individual orders.   BLOOD CULTURE   BLOOD CULTURE   RAINBOW DRAW    Narrative:     The following orders were created for panel order Lotus Draw.  Procedure                               Abnormality         Status                     ---------                               -----------         ------                     Green Top " (Gel)[[id number]]                                  Final result               Lavender Top[[id number]]                                     Final result               Gold Top - SST[[id number]]                                   Final result               Gray Top[[id number]]                                         Final result               Light Blue Top[[id number]]                                   Final result                 Please view results for these tests on the individual orders.   BLOOD GAS, ARTERIAL   BLOOD GAS, VENOUS   CBC AND DIFFERENTIAL    Narrative:     The following orders were created for panel order CBC & Differential.  Procedure                               Abnormality         Status                     ---------                               -----------         ------                     CBC Auto Differential[[id number]]        Abnormal            Final result                 Please view results for these tests on the individual orders.   GREEN TOP   LAVENDER TOP   GOLD TOP - SST   GRAY TOP   LIGHT BLUE TOP       Meds Given in ED:   Medications   sodium chloride 0.9 % flush 10 mL (has no administration in time range)   ipratropium-albuterol (DUO-NEB) nebulizer solution 3 mL (3 mL Nebulization Given 10/14/24 1343)   methylPREDNISolone sodium succinate (SOLU-Medrol) injection 125 mg (125 mg Intravenous Given 10/14/24 1336)   magnesium sulfate 2g/50 mL (PREMIX) infusion (0 g Intravenous Stopped 10/14/24 1417)           Last NIH score:                                                          Dysphagia screening results:  Patient Factors Component (Dysphagia:Stroke or Rule-out)  Best Eye Response: 4-->(E4) spontaneous (10/14/24 1554)  Best Motor Response: 6-->(M6) obeys commands (10/14/24 1554)  Best Verbal Response: 5-->(V5) oriented (10/14/24 1554)  Chemo Coma Scale Score: 15 (10/14/24 1554)     Chemo Coma Scale:  No data recorded     CIWA:        Restraint Type:            Isolation Status:  No  active isolations          Electronically signed by Theresa Xie RN at 10/14/24 1644       Raymon Cruz MD at 10/14/24 1613           EMERGENCY DEPARTMENT ENCOUNTER    Pt Name: Elvi Keita  MRN: [id number]  Pt :   1955  Room Number:    Date of encounter:  10/14/2024  PCP: Maria Alejandra West APRN  ED Provider: Raymon Cruz MD    Historian: Patient, daughter      HPI:  Chief Complaint: Shortness of breath, cough        Context: Elvi Keita is a 69-year-old woman who is on chronic oxygen for COPD who continues to smoke who presents for evaluation of worsening dyspnea over the last few weeks she says she was feeling better after she was hospitalized within about a week later started getting worse again she has chronic productive cough.  She knows of no sick contacts and denies fevers.  No other complaints at this time.       PAST MEDICAL HISTORY  Past Medical History:   Diagnosis Date    COPD (chronic obstructive pulmonary disease)     Hypertension          PAST SURGICAL HISTORY  Past Surgical History:   Procedure Laterality Date    LAPAROSCOPIC TUBAL LIGATION      TOE SURGERY           FAMILY HISTORY  Family History   Problem Relation Age of Onset    COPD Mother     No Known Problems Father          SOCIAL HISTORY  Social History     Socioeconomic History    Marital status:    Tobacco Use    Smoking status: Every Day     Current packs/day: 1.00     Average packs/day: 1 pack/day for 53.8 years (53.8 ttl pk-yrs)     Types: Cigarettes     Start date:    Vaping Use    Vaping status: Never Used   Substance and Sexual Activity    Drug use: Never    Sexual activity: Not Currently         ALLERGIES  Fish oil        REVIEW OF SYSTEMS  Review of Systems       All systems reviewed and negative except for those discussed in HPI.       PHYSICAL EXAM    I have reviewed the triage vital signs and nursing notes.    ED Triage Vitals [10/14/24 1224]   Temp Heart Rate Resp BP SpO2   98.2 °F (36.8 °C)  66 16 123/56 95 %      Temp src Heart Rate Source Patient Position BP Location FiO2 (%)   Oral Monitor Sitting Left arm --       Physical Exam  GENERAL:   Appears acute on chronically ill  HENT: Nares patent.  EYES: No scleral icterus.  CV: Regular rhythm, regular rate.  RESPIRATORY: Wet cough, decreased air movement and expiratory wheezes in all lung fields  ABDOMEN: Soft, nontender  MUSCULOSKELETAL: No deformities.   NEURO: Alert, moves all extremities, follows commands.  SKIN: Warm, dry, no rash visualized.      LAB RESULTS  Recent Results (from the past 24 hours)   ECG 12 Lead ED Triage Standing Order; SOA    Collection Time: 10/14/24 12:30 PM   Result Value Ref Range    QT Interval 428 ms    QTC Interval 445 ms   Comprehensive Metabolic Panel    Collection Time: 10/14/24 12:38 PM    Specimen: Blood   Result Value Ref Range    Glucose 115 (H) 65 - 99 mg/dL    BUN 7 (L) 8 - 23 mg/dL    Creatinine 0.73 0.57 - 1.00 mg/dL    Sodium 140 136 - 145 mmol/L    Potassium 4.5 3.5 - 5.2 mmol/L    Chloride 95 (L) 98 - 107 mmol/L    CO2 41.0 (H) 22.0 - 29.0 mmol/L    Calcium 8.9 8.6 - 10.5 mg/dL    Total Protein 6.5 6.0 - 8.5 g/dL    Albumin 4.0 3.5 - 5.2 g/dL    ALT (SGPT) 8 1 - 33 U/L    AST (SGOT) 12 1 - 32 U/L    Alkaline Phosphatase 101 39 - 117 U/L    Total Bilirubin 0.4 0.0 - 1.2 mg/dL    Globulin 2.5 gm/dL    A/G Ratio 1.6 g/dL    BUN/Creatinine Ratio 9.6 7.0 - 25.0    Anion Gap 4.0 (L) 5.0 - 15.0 mmol/L    eGFR 89.2 >60.0 mL/min/1.73   BNP    Collection Time: 10/14/24 12:38 PM    Specimen: Blood   Result Value Ref Range    proBNP 616.9 0.0 - 900.0 pg/mL   Single High Sensitivity Troponin T    Collection Time: 10/14/24 12:38 PM    Specimen: Blood   Result Value Ref Range    HS Troponin T 9 <14 ng/L   Green Top (Gel)    Collection Time: 10/14/24 12:38 PM   Result Value Ref Range    Extra Tube Hold for add-ons.    Lavender Top    Collection Time: 10/14/24 12:38 PM   Result Value Ref Range    Extra Tube hold for add-on     Gold Top - SST    Collection Time: 10/14/24 12:38 PM   Result Value Ref Range    Extra Tube Hold for add-ons.    Gray Top    Collection Time: 10/14/24 12:38 PM   Result Value Ref Range    Extra Tube Hold for add-ons.    Light Blue Top    Collection Time: 10/14/24 12:38 PM   Result Value Ref Range    Extra Tube Hold for add-ons.    CBC Auto Differential    Collection Time: 10/14/24 12:38 PM    Specimen: Blood   Result Value Ref Range    WBC 8.19 3.40 - 10.80 10*3/mm3    RBC 4.77 3.77 - 5.28 10*6/mm3    Hemoglobin 15.0 12.0 - 15.9 g/dL    Hematocrit 49.1 (H) 34.0 - 46.6 %    .9 (H) 79.0 - 97.0 fL    MCH 31.4 26.6 - 33.0 pg    MCHC 30.5 (L) 31.5 - 35.7 g/dL    RDW 14.9 12.3 - 15.4 %    RDW-SD 56.5 (H) 37.0 - 54.0 fl    MPV 10.2 6.0 - 12.0 fL    Platelets 155 140 - 450 10*3/mm3    Neutrophil % 75.6 42.7 - 76.0 %    Lymphocyte % 16.7 (L) 19.6 - 45.3 %    Monocyte % 5.5 5.0 - 12.0 %    Eosinophil % 1.1 0.3 - 6.2 %    Basophil % 0.6 0.0 - 1.5 %    Immature Grans % 0.5 0.0 - 0.5 %    Neutrophils, Absolute 6.19 1.70 - 7.00 10*3/mm3    Lymphocytes, Absolute 1.37 0.70 - 3.10 10*3/mm3    Monocytes, Absolute 0.45 0.10 - 0.90 10*3/mm3    Eosinophils, Absolute 0.09 0.00 - 0.40 10*3/mm3    Basophils, Absolute 0.05 0.00 - 0.20 10*3/mm3    Immature Grans, Absolute 0.04 0.00 - 0.05 10*3/mm3    nRBC 0.0 0.0 - 0.2 /100 WBC   D-dimer, Quantitative    Collection Time: 10/14/24 12:38 PM    Specimen: Blood   Result Value Ref Range    D-Dimer, Quantitative 0.40 0.00 - 0.69 MCGFEU/mL   Lactic Acid, Plasma    Collection Time: 10/14/24 12:38 PM    Specimen: Blood   Result Value Ref Range    Lactate 0.9 0.5 - 2.0 mmol/L   C-reactive Protein    Collection Time: 10/14/24 12:38 PM    Specimen: Blood   Result Value Ref Range    C-Reactive Protein 0.89 (H) 0.00 - 0.50 mg/dL   Magnesium    Collection Time: 10/14/24 12:38 PM    Specimen: Blood   Result Value Ref Range    Magnesium 1.7 1.6 - 2.4 mg/dL   COVID-19, FLU A/B, RSV PCR 1 HR TAT -  Swab, Nasopharynx    Collection Time: 10/14/24  1:23 PM    Specimen: Nasopharynx; Swab   Result Value Ref Range    COVID19 Not Detected Not Detected - Ref. Range    Influenza A PCR Not Detected Not Detected    Influenza B PCR Not Detected Not Detected    RSV, PCR Not Detected Not Detected   Blood Gas, Arterial With Co-Ox    Collection Time: 10/14/24  2:25 PM    Specimen: Arterial Blood   Result Value Ref Range    Site Right Radial     Santiago's Test N/A     pH, Arterial 7.306 (L) 7.350 - 7.450 pH units    pCO2, Arterial 82.9 (C) 35.0 - 45.0 mm Hg    pO2, Arterial 62.5 (L) 83.0 - 108.0 mm Hg    HCO3, Arterial 41.3 (H) 20.0 - 26.0 mmol/L    Base Excess, Arterial 10.8 (H) 0.0 - 2.0 mmol/L    Hemoglobin, Blood Gas 15.0 14 - 18 g/dL    Hematocrit, Blood Gas 46.1 38.0 - 51.0 %    Oxyhemoglobin 84.7 (L) 94 - 99 %    Methemoglobin 0.20 0.00 - 1.50 %    Carboxyhemoglobin 5.9 (H) 0 - 2 %    CO2 Content 43.8 (H) 22 - 33 mmol/L    Temperature 37.0     Barometric Pressure for Blood Gas      Modality Room Air     FIO2 21 %    Rate 0 Breaths/minute    PIP 0 cmH2O    IPAP 0     EPAP 0     Notified Who CHRISTIEN CALABRESE     Notified By [id number]     Notified Time 10/14/2024 14:30     pH, Temp Corrected 7.306 pH Units    pCO2, Temperature Corrected 82.9 (H) 35 - 45 mm Hg    pO2, Temperature Corrected 62.5 (L) 83 - 108 mm Hg   Urinalysis With Microscopic If Indicated (No Culture) - Urine, Clean Catch    Collection Time: 10/14/24  3:18 PM    Specimen: Urine, Clean Catch   Result Value Ref Range    Color, UA Yellow Yellow, Straw    Appearance, UA Clear Clear    pH, UA 6.0 5.0 - 8.0    Specific Gravity, UA 1.010 1.001 - 1.030    Glucose, UA Negative Negative    Ketones, UA Negative Negative    Bilirubin, UA Negative Negative    Blood, UA Negative Negative    Protein, UA Negative Negative    Leuk Esterase, UA Negative Negative    Nitrite, UA Negative Negative    Urobilinogen, UA 0.2 E.U./dL 0.2 - 1.0 E.U./dL   Blood Gas, Arterial With Co-Ox     Collection Time: 10/14/24  3:42 PM    Specimen: Arterial Blood   Result Value Ref Range    Site Right Radial     Santiago's Test Positive     pH, Arterial 7.328 (L) 7.350 - 7.450 pH units    pCO2, Arterial 76.9 (C) 35.0 - 45.0 mm Hg    pO2, Arterial 55.8 (L) 83.0 - 108.0 mm Hg    HCO3, Arterial 40.4 (H) 20.0 - 26.0 mmol/L    Base Excess, Arterial 10.6 (H) 0.0 - 2.0 mmol/L    Hemoglobin, Blood Gas 15.1 14 - 18 g/dL    Hematocrit, Blood Gas 46.1 38.0 - 51.0 %    Oxyhemoglobin 82.7 (L) 94 - 99 %    Methemoglobin 0.20 0.00 - 1.50 %    Carboxyhemoglobin 5.5 (H) 0 - 2 %    CO2 Content 42.7 (H) 22 - 33 mmol/L    Temperature 37.0     Barometric Pressure for Blood Gas      Modality BiPap     FIO2 30 %    Ventilator Mode BiPAP     Rate 0 Breaths/minute    PEEP 5.0     PIP 0 cmH2O    IPAP 0     EPAP 0     Notified [facility] CHRISTINE CALABRESE     Notified By [id number]     Notified Time 10/14/2024 15:47     pH, Temp Corrected 7.328 pH Units    pCO2, Temperature Corrected 76.9 (H) 35 - 45 mm Hg    pO2, Temperature Corrected 55.8 (L) 83 - 108 mm Hg       If labs were ordered, I independently reviewed the results and considered them in treating the patient.        RADIOLOGY  XR Chest 1 View    Result Date: 10/14/2024  XR CHEST 1 VW Date of Exam: 10/14/2024 12:53 PM EDT Indication: SOA triage protocol Comparison: 9/2/2024 Findings: Cardiomediastinal silhouette is unremarkable.  No airspace disease, pneumothorax, nor pleural effusion. The lungs are emphysematous with chronic interstitial changes. No acute osseous abnormality identified.     Impression: Stable appearance of the chest with emphysema and chronic interstitial changes. Electronically Signed: Claudia Romero MD  10/14/2024 1:24 PM EDT  Workstation ID: [id number]     I ordered and independently reviewed the above noted radiographic studies.      I viewed images of chest x-ray which showed emphysema changes but no acute pathology that I can appreciate per my independent interpretation.    See  radiologist's dictation for official interpretation.        PROCEDURES    Procedures    ECG 12 Lead ED Triage Standing Order; SOA   Preliminary Result   Test Reason : ED Triage Standing Order~   Blood Pressure :   */*   mmHG   Vent. Rate :  65 BPM     Atrial Rate :  65 BPM      P-R Int : 206 ms          QRS Dur :  92 ms       QT Int : 428 ms       P-R-T Axes :  76  34  35 degrees      QTc Int : 445 ms      Normal sinus rhythm   Normal ECG   When compared with ECG of 30-AUG-2024 09:43,   No significant change was found      Referred By: EDMD           Confirmed By:           MEDICATIONS GIVEN IN ER    Medications   sodium chloride 0.9 % flush 10 mL (has no administration in time range)   ipratropium-albuterol (DUO-NEB) nebulizer solution 3 mL (3 mL Nebulization Given 10/14/24 1343)   methylPREDNISolone sodium succinate (SOLU-Medrol) injection 125 mg (125 mg Intravenous Given 10/14/24 1336)   magnesium sulfate 2g/50 mL (PREMIX) infusion (0 g Intravenous Stopped 10/14/24 1417)         MEDICAL DECISION MAKING, PROGRESS, and CONSULTS    All labs, if obtained, have been independently reviewed by me.  All radiology studies, if obtained, have been reviewed by me and the radiologist dictating the report.  All EKG's, if obtained, have been independently viewed and interpreted by me/my attending physician.      Discussion below represents my analysis of pertinent findings related to patient's condition, differential diagnosis, treatment plan and final disposition.                         Differential diagnosis:    Acute respiratory failure, respiratory acidosis, hypoxia, COPD exacerbation, pneumonia, pulmonary embolism, heart failure, sepsis, anemia, electrolyte abnormality      Additional sources:    - Discussed/ obtained information from independent historians: Daughter    - External (non-ED) record review:  Chart review of recent hospitalization for COPD exacerbation and elevated creatinine that responded to IV fluid shows  history of:  COPD with chronic hypoxemic respiratory failure and hypertension    - Chronic or social conditions impacting care: COPD, chronic hypoxia, ongoing tobacco abuse    - Shared decision making: Patient/patient representative in complete agreement with current plans for evaluation and management.      Orders placed during this visit:  Orders Placed This Encounter   Procedures    COVID PRE-OP / PRE-PROCEDURE SCREENING ORDER (NO ISOLATION) - Swab, Nasopharynx    Blood Culture - Blood,    Blood Culture - Blood,    COVID-19, FLU A/B, RSV PCR 1 HR TAT - Swab, Nasopharynx    XR Chest 1 View    [place] Draw    Comprehensive Metabolic Panel    BNP    Single High Sensitivity Troponin T    CBC Auto Differential    Urinalysis With Microscopic If Indicated (No Culture) - Urine, Clean Catch    Blood Gas, Arterial -With Co-Ox Panel: Yes    D-dimer, Quantitative    Lactic Acid, Plasma    C-reactive Protein    Magnesium    Blood Gas, Arterial With Co-Ox    Blood Gas, Venous -With Co-Ox Panel: Yes    Blood Gas, Arterial With Co-Ox    NPO Diet NPO Type: Strict NPO    Undress & Gown    Continuous Pulse Oximetry    Vital Signs    Oxygen Therapy- Nasal Cannula; Titrate 1-6 LPM Per SpO2; 90 - 95%    NIPPV-IPPV / BIPAP / CPAP    ECG 12 Lead ED Triage Standing Order; SOA    Insert Peripheral IV    CBC & Differential    Green Top (Gel)    Lavender Top    Gold Top - SST    Gray Top    Light Blue Top         Additional orders considered but not ordered:      ED Course:    Consultants:      ED Course as of 10/14/24 1614   Mon Oct 14, 2024   1252 Chart review of recent hospitalization for COPD exacerbation and elevated creatinine that responded to IV fluid shows history of:  COPD with chronic hypoxemic respiratory failure and hypertension [CC]   1253 This is a 69-year-old woman who is on chronic oxygen for COPD who continues to smoke who presents for evaluation of worsening dyspnea over the last few weeks she says she was feeling better  after she was hospitalized within about a week later started getting worse again she has chronic productive cough.  She knows of no sick contacts and denies fevers.  No other complaints at this time. [CC]   1253 She arrived awake and alert she has productive sounding cough wet rhonchorous breath sounds and expiratory wheezes in all lung fields she does appear to be satting well on 3 L nasal cannula treating with nebulizer magnesium Solu-Medrol obtaining full respiratory infectious workup will reevaluate pending initial workup. [CC]   1610 Chest x-ray only showing emphysematous changes.  CBC reassuring nonactionable metabolic panel likewise no actionable items  D-dimer is negative  Negative troponin.  Initial blood gas concerning for acute on chronic hypercapnia with a mild respiratory acidosis [CC]   1611 BiPAP she is tolerating this well on minimal settings 12 over 5, 30%.  Repeat blood gas still mildly acidotic but trending in the right direction she remains well upon reevaluation I think she is stable for telemetry.  Have discussed with Dr. Lane for hospital admission. [CC]      ED Course User Index  [CC] Raymon Cruz MD     40 minutes of critical care provided. This time excludes other billable procedures. Time does include preparation of documents, medical consultations, review of old records, and direct bedside care. Patient is at high risk for life-threatening deterioration due to acute on chronic respiratory failure with hypoxia and hypercapnia with respiratory acidosis requiring positive pressure ventilation and multiple IV and nebulizer medication.           Shared Decision Making:  After my consideration of clinical presentation and any laboratory/radiology studies obtained, I discussed the findings with the patient/patient representative who is in agreement with the treatment plan and the final disposition.   Risks and benefits of discharge and/or observation/admission were discussed.        AS OF 16:14 EDT VITALS:    BP - 129/60  HR - 64  TEMP - 98.2 °F (36.8 °C) (Oral)  O2 SATS - (!) 86%                  DIAGNOSIS  Final diagnoses:   Respiratory acidosis   Acute on chronic respiratory failure with hypoxia and hypercapnia   COPD with exacerbation   Cigarette nicotine dependence without complication         DISPOSITION  Admit      Please note that portions of this document were completed with voice recognition software.        Raymon Cruz MD  10/14/24 1618      Electronically signed by Raymon Cruz MD at 10/14/24 1618       Vital Signs (last day)       Date/Time Temp Temp src Pulse Resp BP Patient Position SpO2    10/15/24 0720 97.6 (36.4) Axillary 61 18 144/68 Lying 96    10/15/24 0714 -- -- 69 16 -- Lying 95    10/15/24 0428 97.6 (36.4) Oral 65 18 140/74 Lying 97    10/15/24 0016 97.5 (36.4) Oral 60 18 122/53 Lying 93    10/14/24 2234 -- -- 68 18 -- -- 92    10/14/24 2018 -- -- 73 -- -- -- --    10/14/24 2010 98.1 (36.7) Oral 71 20 112/70 Lying --    10/14/24 1922 -- -- 73 20 -- -- 91    10/14/24 1745 98.1 (36.7) Oral 69 18 150/65 Lying 94    10/14/24 1718 -- -- 65 -- -- -- 92    10/14/24 1713 -- -- 64 -- -- -- 94    10/14/24 1658 -- -- 64 -- 133/66 -- 93    10/14/24 1630 -- -- 61 -- 122/70 -- 93    10/14/24 1628 -- -- 64 -- -- -- 93    10/14/24 1620 -- -- 63 -- -- -- --    10/14/24 1615 -- -- -- -- -- -- 95    10/14/24 1610 -- -- -- -- -- -- 98    10/14/24 1605 -- -- -- -- -- -- 92    10/14/24 1600 -- -- 64 -- 127/58 -- --    10/14/24 1555 -- -- 64 -- -- -- 86    10/14/24 1550 -- -- 64 -- -- -- 90    10/14/24 1502 -- -- 63 -- 129/60 -- 87    10/14/24 1450 -- -- 65 -- -- -- 89    10/14/24 1442 -- -- 63 -- -- -- 90    10/14/24 1439 -- -- 64 -- -- -- 97    10/14/24 1429 -- -- 65 -- 119/54 -- 93    10/14/24 1412 -- -- 65 -- 92/62 -- 86    10/14/24 1402 -- -- 64 -- 112/50 -- 92    10/14/24 1343 -- -- 69 18 -- -- 96    10/14/24 1330 -- -- 0 -- 132/60 -- 96    10/14/24 1224 98.2  (36.8) Oral 66 16 123/56 Sitting 95          Oxygen Therapy (last day)       Date/Time SpO2 Device (Oxygen Therapy) Flow (L/min) (Oxygen Therapy) Oxygen Concentration (%) ETCO2 (mmHg)    10/15/24 0720 96 humidified;nasal cannula 4 -- --    10/15/24 0714 95 nasal cannula;humidified 4 -- --    10/15/24 0601 -- humidified;nasal cannula 6 -- --    10/15/24 0428 97 nasal cannula;humidified -- -- --    10/15/24 0408 -- NPPV/NIV -- -- --    10/15/24 0305 -- -- -- 50 --    10/15/24 0213 -- NPPV/NIV -- 50 --    10/15/24 0047 -- -- -- 50 --    10/15/24 0040 -- NPPV/NIV -- 50 --    10/15/24 0016 93 -- -- -- --    10/14/24 2234 92 humidified;nasal cannula 5 -- --    10/14/24 2215 -- humidified;nasal cannula 5 -- --    10/14/24 2034 -- humidified;nasal cannula 6 -- --    10/14/24 1922 91 nasal cannula 6 -- --    10/14/24 1842 -- nasal cannula 6 -- --    10/14/24 1745 94 nasal cannula 6 -- --    10/14/24 1718 92 -- -- -- --    10/14/24 1713 94 -- -- -- --    10/14/24 1658 93 -- -- -- --    10/14/24 1630 93 -- -- -- --    10/14/24 1628 93 -- -- -- --    10/14/24 1615 95 -- -- -- --    10/14/24 1610 98 -- -- -- --    10/14/24 1605 92 -- -- -- --    10/14/24 1555 86 -- -- -- --    10/14/24 1550 90 -- -- -- --    10/14/24 1502 87 -- -- -- --    10/14/24 1450 89 -- -- -- --    10/14/24 1442 90 -- -- -- --    10/14/24 1439 97 NPPV/NIV -- 30 --    10/14/24 1429 93 -- -- -- --    10/14/24 1412 86 -- -- -- --    10/14/24 1402 92 -- -- -- --    10/14/24 1343 96 nasal cannula 3 -- --    10/14/24 1330 96 -- -- -- --    10/14/24 1224 95 nasal cannula 3 -- --          Lines, Drains & Airways       Active LDAs       Name Placement date Placement time Site Days    Peripheral IV 10/14/24 1324 Left Antecubital 10/14/24  1324  Antecubital  less than 1                  Current Facility-Administered Medications   Medication Dose Route Frequency Provider Last Rate Last Admin    acetaminophen (TYLENOL) tablet 650 mg  650 mg Oral Q4H PRN Angelica Can,  APRN        Or    acetaminophen (TYLENOL) 160 MG/5ML oral solution 650 mg  650 mg Oral Q4H PRN Angelica Can APRN        Or    acetaminophen (TYLENOL) suppository 650 mg  650 mg Rectal Q4H PRN Angelica Can APRN        arformoterol (BROVANA) nebulizer solution 15 mcg  15 mcg Nebulization BID - RT Angelica Can APRN   15 mcg at 10/15/24 0715    And    tiotropium (SPIRIVA RESPIMAT) 2.5 mcg/act aerosol solution inhaler  2 puff Inhalation Daily - RT Angelica Can APRN   2 puff at 10/15/24 0715    azithromycin (ZITHROMAX) tablet 500 mg  500 mg Oral Q24H Angelica Can APRN   500 mg at 10/14/24 1835    sennosides-docusate (PERICOLACE) 8.6-50 MG per tablet 2 tablet  2 tablet Oral BID PRN Angelica Can APRN        And    polyethylene glycol (MIRALAX) packet 17 g  17 g Oral Daily PRN Angelica Can APRN        And    bisacodyl (DULCOLAX) EC tablet 5 mg  5 mg Oral Daily PRN Angelica Can APRN        And    bisacodyl (DULCOLAX) suppository 10 mg  10 mg Rectal Daily PRN Angelica Can APRN        bisoprolol (ZEBeta) tablet 10 mg  10 mg Oral Daily Angelica Can APRN        And    hydroCHLOROthiazide tablet 6.25 mg  6.25 mg Oral Daily Angelica Can APRN        calcium carbonate (TUMS) chewable tablet 500 mg (200 mg elemental)  2 tablet Oral BID PRN Angelica Can APRN        Calcium Replacement - Follow Nurse / BPA Driven Protocol   Does not apply PRN Angelica Can APRN        gabapentin (NEURONTIN) capsule 800 mg  800 mg Oral Q8H Angelica aCn APRN   800 mg at 10/15/24 0536    ipratropium-albuterol (DUO-NEB) nebulizer solution 3 mL  3 mL Nebulization 4x Daily - RT Zakia Lane DO   3 mL at 10/15/24 0714    Magnesium Standard Dose Replacement - Follow Nurse / BPA Driven Protocol   Does not apply PRN Angelica Can APRN        methylPREDNISolone sodium succinate (SOLU-Medrol) injection 60 mg  60 mg Intravenous Q12H Angelica Can APRN   60 mg at 10/14/24 2034    nicotine (NICODERM CQ) 21 MG/24HR patch 1 patch  1  patch Transdermal Q24H Angelica Can APRN   1 patch at 10/14/24 1834    nicotine polacrilex (NICORETTE) gum 4 mg  4 mg Mouth/Throat Q1H PRN Angelica Can APRN        nitroglycerin (NITROSTAT) SL tablet 0.4 mg  0.4 mg Sublingual Q5 Min PRN Angelica Can APRN        Phosphorus Replacement - Follow Nurse / BPA Driven Protocol   Does not apply PRN Angelica Can APRN        Potassium Replacement - Follow Nurse / BPA Driven Protocol   Does not apply PRN Angelica Can APRN        [START ON 10/16/2024] predniSONE (DELTASONE) tablet 40 mg  40 mg Oral Daily With Breakfast Angelica Can APRN        promethazine (PHENERGAN) tablet 12.5 mg  12.5 mg Oral Q6H PRN Angelica Can APRN        Or    promethazine (PHENERGAN) suppository 12.5 mg  12.5 mg Rectal Q6H PRN Angelica Can APRN        sodium chloride 0.9 % flush 10 mL  10 mL Intravenous Q12H Angelica Can APRN   10 mL at 10/14/24 2037    sodium chloride 0.9 % flush 10 mL  10 mL Intravenous PRN Angelica Can APRN        traZODone (DESYREL) tablet 100 mg  100 mg Oral Nightly Angelica Can APRN   100 mg at 10/14/24 2030     Lab Results (last 24 hours)       Procedure Component Value Units Date/Time    Basic Metabolic Panel [[id number]]  (Abnormal) Collected: 10/15/24 0350    Specimen: Blood Updated: 10/15/24 0613     Glucose 128 mg/dL      BUN 9 mg/dL      Creatinine 0.65 mg/dL      Sodium 141 mmol/L      Potassium 4.3 mmol/L      Chloride 98 mmol/L      CO2 39.0 mmol/L      Calcium 8.7 mg/dL      BUN/Creatinine Ratio 13.8     Anion Gap 4.0 mmol/L      eGFR 95.4 mL/min/1.73     Narrative:      GFR Normal >60  Chronic Kidney Disease <60  Kidney Failure <15      CBC (No Diff) [[id number]]  (Abnormal) Collected: 10/15/24 0350    Specimen: Blood Updated: 10/15/24 0506     WBC 4.79 10*3/mm3      RBC 4.53 10*6/mm3      Hemoglobin 13.9 g/dL      Hematocrit 45.7 %      .9 fL      MCH 30.7 pg      MCHC 30.4 g/dL      RDW 14.8 %      RDW-SD 55.2 fl      MPV 10.4 fL       Platelets 141 10*3/mm3     Urinalysis With Microscopic If Indicated (No Culture) - Urine, Clean Catch [[id number]]  (Normal) Collected: 10/14/24 1518    Specimen: Urine, Clean Catch Updated: 10/14/24 1604     Color, UA Yellow     Appearance, UA Clear     pH, UA 6.0     Specific Gravity, UA 1.010     Glucose, UA Negative     Ketones, UA Negative     Bilirubin, UA Negative     Blood, UA Negative     Protein, UA Negative     Leuk Esterase, UA Negative     Nitrite, UA Negative     Urobilinogen, UA 0.2 E.U./dL    Narrative:      Urine microscopic not indicated.    Blood Gas, Arterial With Co-Ox [[id number]]  (Abnormal) Collected: 10/14/24 1542    Specimen: Arterial Blood Updated: 10/14/24 1543     Site Right Radial     Santiago's Test Positive     pH, Arterial 7.328 pH units      Comment: 84 Value below reference range        pCO2, Arterial 76.9 mm Hg      Comment: 86 Value above critical limit        pO2, Arterial 55.8 mm Hg      Comment: 84 Value below reference range        HCO3, Arterial 40.4 mmol/L      Base Excess, Arterial 10.6 mmol/L      Hemoglobin, Blood Gas 15.1 g/dL      Hematocrit, Blood Gas 46.1 %      Oxyhemoglobin 82.7 %      Comment: 84 Value below reference range        Methemoglobin 0.20 %      Carboxyhemoglobin 5.5 %      Comment: 83 Value above reference range        CO2 Content 42.7 mmol/L      Temperature 37.0     Barometric Pressure for Blood Gas --     Comment: N/A        Modality BiPap     FIO2 30 %      Ventilator Mode BiPAP     Rate 0 Breaths/minute      PEEP 5.0     PIP 0 cmH2O      Comment: Meter: [id number]     :  [id number]        IPAP 0     EPAP 0     Notified [facility] CHRISTINE CALABRESE     Notified By [id number]     Notified Time 10/14/2024 15:47     pH, Temp Corrected 7.328 pH Units      pCO2, Temperature Corrected 76.9 mm Hg      pO2, Temperature Corrected 55.8 mm Hg     Blood Gas, Arterial With Co-Ox [[id number]]  (Abnormal) Collected: 10/14/24 1425    Specimen: Arterial Blood Updated:  10/14/24 1425     Site Right Radial     Santiago's Test N/A     pH, Arterial 7.306 pH units      Comment: 84 Value below reference range        pCO2, Arterial 82.9 mm Hg      Comment: 86 Value above critical limit        pO2, Arterial 62.5 mm Hg      Comment: 84 Value below reference range        HCO3, Arterial 41.3 mmol/L      Base Excess, Arterial 10.8 mmol/L      Hemoglobin, Blood Gas 15.0 g/dL      Hematocrit, Blood Gas 46.1 %      Oxyhemoglobin 84.7 %      Comment: 84 Value below reference range        Methemoglobin 0.20 %      Carboxyhemoglobin 5.9 %      Comment: 83 Value above reference range        CO2 Content 43.8 mmol/L      Temperature 37.0     Barometric Pressure for Blood Gas --     Comment: N/A        Modality Room Air     FIO2 21 %      Rate 0 Breaths/minute      PIP 0 cmH2O      Comment: Meter: [id number]     :  [id number]        IPAP 0     EPAP 0     Notified [facility] CHRISTINE CALABRESE     Notified By [id number]     Notified Time 10/14/2024 14:30     pH, Temp Corrected 7.306 pH Units      pCO2, Temperature Corrected 82.9 mm Hg      pO2, Temperature Corrected 62.5 mm Hg     COVID PRE-OP / PRE-PROCEDURE SCREENING ORDER (NO ISOLATION) - Swab, Nasopharynx [[id number]]  (Normal) Collected: 10/14/24 1323    Specimen: Swab from Nasopharynx Updated: 10/14/24 1411    Narrative:      The following orders were created for panel order COVID PRE-OP / PRE-PROCEDURE SCREENING ORDER (NO ISOLATION) - Swab, Nasopharynx.  Procedure                               Abnormality         Status                     ---------                               -----------         ------                     COVID-19, FLU A/B, RSV P...[[id number]]  Normal              Final result                 Please view results for these tests on the individual orders.    COVID-19, FLU A/B, RSV PCR 1 HR TAT - Swab, Nasopharynx [[id number]]  (Normal) Collected: 10/14/24 1323    Specimen: Swab from Nasopharynx Updated: 10/14/24 1411     COVID19 Not  "Detected     Influenza A PCR Not Detected     Influenza B PCR Not Detected     RSV, PCR Not Detected    Narrative:      Fact sheet for providers: https://www.fda.gov/media/985066/download    Fact sheet for patients: https://www.fda.gov/media/101903/download    Test performed by PCR.    Blood Culture - Blood, Arm, Left [[id number]] Collected: 10/14/24 1310    Specimen: Blood from Arm, Left Updated: 10/14/24 1338    Blood Culture - Blood, Arm, Right [[id number]] Collected: 10/14/24 1255    Specimen: Blood from Arm, Right Updated: 10/14/24 1338    D-dimer, Quantitative [[id number]]  (Normal) Collected: 10/14/24 1238    Specimen: Blood Updated: 10/14/24 1324     D-Dimer, Quantitative 0.40 MCGFEU/mL     Narrative:      According to the assay 's published package insert, a normal (<0.50 MCGFEU/mL) D-dimer result in conjunction with a non-high clinical probability assessment, excludes deep vein thrombosis (DVT) and pulmonary embolism (PE) with high sensitivity.    D-dimer values increase with age and this can make VTE exclusion of an older population difficult. To address this, the [employer], based on best available evidence and recent guidelines, recommends that clinicians use age-adjusted D-dimer thresholds in patients greater than 50 years of age with: a) a low probability of PE who do not meet all Pulmonary Embolism Rule Out Criteria, or b) in those with intermediate probability of PE.   The formula for an age-adjusted D-dimer cut-off is \"age/100\".  For example, a 60 year old patient would have an age-adjusted cut-off of 0.60 MCGFEU/mL and an 80 year old 0.80 MCGFEU/mL.    Comprehensive Metabolic Panel [[id number]]  (Abnormal) Collected: 10/14/24 1238    Specimen: Blood Updated: 10/14/24 1321     Glucose 115 mg/dL      BUN 7 mg/dL      Creatinine 0.73 mg/dL      Sodium 140 mmol/L      Potassium 4.5 mmol/L      Chloride 95 mmol/L      CO2 41.0 mmol/L      Calcium 8.9 mg/dL      Total Protein " 6.5 g/dL      Albumin 4.0 g/dL      ALT (SGPT) 8 U/L      AST (SGOT) 12 U/L      Alkaline Phosphatase 101 U/L      Total Bilirubin 0.4 mg/dL      Globulin 2.5 gm/dL      Comment: Calculated Result        A/G Ratio 1.6 g/dL      BUN/Creatinine Ratio 9.6     Anion Gap 4.0 mmol/L      eGFR 89.2 mL/min/1.73     Narrative:      GFR Normal >60  Chronic Kidney Disease <60  Kidney Failure <15      Magnesium [[id number]]  (Normal) Collected: 10/14/24 1238    Specimen: Blood Updated: 10/14/24 1321     Magnesium 1.7 mg/dL     BNP [[id number]]  (Normal) Collected: 10/14/24 1238    Specimen: Blood Updated: 10/14/24 1320     proBNP 616.9 pg/mL     Narrative:      This assay is used as an aid in the diagnosis of individuals suspected of having heart failure. It can be used as an aid in the diagnosis of acute decompensated heart failure (ADHF) in patients presenting with signs and symptoms of ADHF to the emergency department (ED). In addition, NT-proBNP of <300 pg/mL indicates ADHF is not likely.    Age Range Result Interpretation  NT-proBNP Concentration (pg/mL:      <50             Positive            >450                   Gray                 300-450                    Negative             <300    50-75           Positive            >900                  Gray                300-900                  Negative            <300      >75             Positive            >1800                  Gray                300-1800                  Negative            <300    Single High Sensitivity Troponin T [[id number]]  (Normal) Collected: 10/14/24 1238    Specimen: Blood Updated: 10/14/24 1320     HS Troponin T 9 ng/L     Narrative:      High Sensitive Troponin T Reference Range:  <14.0 ng/L- Negative Female for AMI  <22.0 ng/L- Negative Male for AMI  >=14 - Abnormal Female indicating possible myocardial injury.  >=22 - Abnormal Male indicating possible myocardial injury.   Clinicians would have to utilize clinical acumen, EKG, Troponin, and  serial changes to determine if it is an Acute Myocardial Infarction or myocardial injury due to an underlying chronic condition.         C-reactive Protein [[id number]]  (Abnormal) Collected: 10/14/24 1238    Specimen: Blood Updated: 10/14/24 1320     C-Reactive Protein 0.89 mg/dL     CBC & Differential [[id number]]  (Abnormal) Collected: 10/14/24 1238    Specimen: Blood Updated: 10/14/24 1320    Narrative:      The following orders were created for panel order CBC & Differential.  Procedure                               Abnormality         Status                     ---------                               -----------         ------                     CBC Auto Differential[[id number]]        Abnormal            Final result                 Please view results for these tests on the individual orders.    CBC Auto Differential [[id number]]  (Abnormal) Collected: 10/14/24 1238    Specimen: Blood Updated: 10/14/24 1320     WBC 8.19 10*3/mm3      RBC 4.77 10*6/mm3      Hemoglobin 15.0 g/dL      Hematocrit 49.1 %      .9 fL      MCH 31.4 pg      MCHC 30.5 g/dL      RDW 14.9 %      RDW-SD 56.5 fl      MPV 10.2 fL      Platelets 155 10*3/mm3      Neutrophil % 75.6 %      Lymphocyte % 16.7 %      Monocyte % 5.5 %      Eosinophil % 1.1 %      Basophil % 0.6 %      Immature Grans % 0.5 %      Neutrophils, Absolute 6.19 10*3/mm3      Lymphocytes, Absolute 1.37 10*3/mm3      Monocytes, Absolute 0.45 10*3/mm3      Eosinophils, Absolute 0.09 10*3/mm3      Basophils, Absolute 0.05 10*3/mm3      Immature Grans, Absolute 0.04 10*3/mm3      nRBC 0.0 /100 WBC     Lactic Acid, Plasma [[id number]]  (Normal) Collected: 10/14/24 1238    Specimen: Blood Updated: 10/14/24 1314     Lactate 0.9 mmol/L      Comment: Falsely depressed results may occur on samples drawn from patients receiving N-Acetylcysteine (NAC) or Metamizole.       [place] Draw [[id number]] Collected: 10/14/24 1238    Specimen: Blood Updated: 10/14/24 1300    Narrative:       The following orders were created for panel order [place] Draw.  Procedure                               Abnormality         Status                     ---------                               -----------         ------                     Green Top (Gel)[[id number]]                                  Final result               Lavender Top[[id number]]                                     Final result               Gold Top - SST[[id number]]                                   Final result               Gray Top[[id number]]                                         Final result               Light Blue Top[[id number]]                                   Final result                 Please view results for these tests on the individual orders.    Green Top (Gel) [[id number]] Collected: 10/14/24 1238    Specimen: Blood Updated: 10/14/24 1300     Extra Tube Hold for add-ons.     Comment: Auto resulted.       Lavender Top [[id number]] Collected: 10/14/24 1238    Specimen: Blood Updated: 10/14/24 1300     Extra Tube hold for add-on     Comment: Auto resulted       Gold Top - SST [[id number]] Collected: 10/14/24 1238    Specimen: Blood Updated: 10/14/24 1300     Extra Tube Hold for add-ons.     Comment: Auto resulted.       Gray Top [[id number]] Collected: 10/14/24 1238    Specimen: Blood Updated: 10/14/24 1300     Extra Tube Hold for add-ons.     Comment: Auto resulted.       Light Blue Top [[id number]] Collected: 10/14/24 1238    Specimen: Blood Updated: 10/14/24 1300     Extra Tube Hold for add-ons.     Comment: Auto resulted             Imaging Results (Last 24 Hours)       Procedure Component Value Units Date/Time    XR Chest 1 View [[id number]] Collected: 10/14/24 1323     Updated: 10/14/24 1327    Narrative:      XR CHEST 1 VW    Date of Exam: 10/14/2024 12:53 PM EDT    Indication: SOA triage protocol    Comparison: 9/2/2024    Findings:  Cardiomediastinal silhouette is unremarkable.  No airspace disease, pneumothorax, nor pleural  effusion. The lungs are emphysematous with chronic interstitial changes. No acute osseous abnormality identified.      Impression:      Impression:  Stable appearance of the chest with emphysema and chronic interstitial changes.      Electronically Signed: Claudia Romero MD    10/14/2024 1:24 PM EDT    Workstation ID: [id number]          ECG/EMG Results (last 24 hours)       Procedure Component Value Units Date/Time    ECG 12 Lead ED Triage Standing Order; SOA [[id number]] Collected: 10/14/24 1230     Updated: 10/15/24 0556     QT Interval 428 ms      QTC Interval 445 ms     Narrative:      Test Reason : ED Triage Standing Order~  Blood Pressure :   */*   mmHG  Vent. Rate :  65 BPM     Atrial Rate :  65 BPM     P-R Int : 206 ms          QRS Dur :  92 ms      QT Int : 428 ms       P-R-T Axes :  76  34  35 degrees     QTc Int : 445 ms    Normal sinus rhythm  Normal ECG  When compared with ECG of 30-AUG-2024 09:43,  No significant change was found  Confirmed by FRANKLIN KC ([id number]) on 10/15/2024 5:56:16 AM    Referred By: EDMD           Confirmed By: FRANKLIN KC            No notes of this type exist for this encounter.

## 2024-10-15 NOTE — CASE MANAGEMENT/SOCIAL WORK
Continued Stay Note  [facility]     Patient Name: Elvi Keita  MRN: [id number]  Today's Date: 10/15/2024    Admit Date: 10/14/2024    Plan: discharge plan   Discharge Plan       Row Name 10/15/24 1358       Plan    Plan discharge plan    Plan Comments I received  a consult for trilogy with no preference to a particular [employer] company. A referrals was made to [facility] and spoke with Perla. Perla will retreive clinical informationfrom epic. CM will cont to follow    Final Discharge Disposition Code 01 - home or self-care      Row Name 10/15/24 7869       Plan    Plan discharge plan    Plan Comments . Pt is here with COPD and states she wears 3-4 L cont at home, provided by [employer]. Plan is home with family and currently denies discharge needs. CM will cont to follow    Final Discharge Disposition Code 01 - home or self-care                   Discharge Codes    No documentation.                 Expected Discharge Date and Time       Expected Discharge Date Expected Discharge Time    Oct 17, 2024               Xena Hemphill RN

## 2024-10-15 NOTE — THERAPY EVALUATION
Patient Name: Elvi Keita  : 1955    MRN: [id number]                              Today's Date: 10/15/2024       Admit Date: 10/14/2024    Visit Dx:     ICD-10-CM ICD-9-CM   1. Respiratory acidosis  E87.29 276.2   2. Acute on chronic respiratory failure with hypoxia and hypercapnia  J96.21 518.84    J96.22 786.09     799.02   3. COPD with exacerbation  J44.1 491.21   4. Cigarette nicotine dependence without complication  F17.210 305.1     Patient Active Problem List   Diagnosis    COPD with exacerbation    Chronic hypoxemic respiratory failure    Cor pulmonale    Generalized osteoarthritis    Hyperlipidemia    Lumbar spondylosis    Nicotine dependence    Essential hypertension    Acute and chronic respiratory failure with hypoxia     Past Medical History:   Diagnosis Date    COPD (chronic obstructive pulmonary disease)     Hypertension      Past Surgical History:   Procedure Laterality Date    LAPAROSCOPIC TUBAL LIGATION      TOE SURGERY        General Information       Row Name 10/15/24 0750          Physical Therapy Time and Intention    Document Type evaluation  -NS     Mode of Treatment physical therapy  -NS       Row Name 10/15/24 0750          General Information    Patient Profile Reviewed yes  -NS     Prior Level of Function independent:;all household mobility;transfer;gait;bed mobility;ADL's  no AD use at baseline, wears 4L O2  -NS     Existing Precautions/Restrictions fall;oxygen therapy device and L/min  -NS     Barriers to Rehab medically complex;previous functional deficit  -NS       Row Name 10/15/24 0750          Living Environment    People in Home spouse;grandchild(fani)  -NS       Row Name 10/15/24 0750          Home Main Entrance    Number of Stairs, Main Entrance five  -NS     Stair Railings, Main Entrance railings safe and in good condition  -NS       Row Name 10/15/24 0750          Stairs Within Home, Primary    Number of Stairs, Within Home, Primary none  -NS       Row Name 10/15/24 0752           Cognition    Orientation Status (Cognition) oriented x 4  -NS       Row Name 10/15/24 0750          Safety Issues/Impairments Affecting Functional Mobility    Safety Issues Affecting Function (Mobility) safety precaution awareness;safety precautions follow-through/compliance  -NS     Impairments Affecting Function (Mobility) balance;endurance/activity tolerance;shortness of breath;strength  -NS               User Key  (r) = Recorded By, (t) = Taken By, (c) = Cosigned By      Initials Name Provider Type    Gema Nieves PT Physical Therapist                   Mobility       Row Name 10/15/24 0750          Bed Mobility    Bed Mobility supine-sit  -NS     Supine-Sit [place] (Bed Mobility) standby assist  -NS     Assistive Device (Bed Mobility) bed rails;head of bed elevated  -NS     Comment, (Bed Mobility) O2 85% on 5L O2 upon sitting EOB- recovering quickly.  -NS       Row Name 10/15/24 0750          Sit-Stand Transfer    Sit-Stand [place] (Transfers) contact guard  -NS       Row Name 10/15/24 [facility]          Gait/Stairs (Locomotion)    [place] Level (Gait) contact guard  -NS     Patient was able to Ambulate yes  -NS     Distance in Feet (Gait) 8  -NS     Deviations/Abnormal Patterns (Gait) alexis decreased;gait speed decreased;stride length decreased  -NS     Bilateral Gait Deviations heel strike decreased  -NS     Comment, (Gait/Stairs) Patient ambulated 8ft to the restroom then an additional 15ft to the chair, demonstrating slow pace and decent balance. Pt's O2 decreased to 76% on 6L O2, requiring 2 mins to recover with rest and cues for PLB. Pt reported feeling mildly SOA but not severely.  -NS               User Key  (r) = Recorded By, (t) = Taken By, (c) = Cosigned By      Initials Name Provider Type    Gema Nieves PT Physical Therapist                   Obj/Interventions       Row Name 10/15/24 0750          Range of Motion Comprehensive    General Range of Motion bilateral  lower extremity ROM WFL  -NS       [facility] Name 10/15/24 0750          Strength Comprehensive (MMT)    General Manual Muscle Testing (MMT) Assessment lower extremity strength deficits identified  -NS     Comment, General Manual Muscle Testing (MMT) Assessment B ankle DF: 5/5, B knee ext: 4/5  -NS       [facility] Name 10/15/24 0750          Balance    Balance Assessment sitting static balance;sitting dynamic balance;standing static balance;standing dynamic balance  -NS     Static Sitting Balance independent  -NS     Dynamic Sitting Balance standby assist  -NS     Position, Sitting Balance unsupported;sitting in chair  -NS     Static Standing Balance contact guard  -NS     Dynamic Standing Balance contact guard  -NS     Position/Device Used, Standing Balance unsupported  -NS       [facility] Name 10/15/24 0750          Sensory Assessment (Somatosensory)    Sensory Assessment (Somatosensory) LE sensation intact  -NS               User Key  (r) = Recorded By, (t) = Taken By, (c) = Cosigned By      Initials Name Provider Type    Gema Nieves, PT Physical Therapist                   Goals/Plan       Row Name 10/15/24 0750          Bed Mobility Goal 1 (PT)    Activity/Assistive Device (Bed Mobility Goal 1, PT) supine to sit  -NS     [place] Level/Cues Needed (Bed Mobility Goal 1, PT) independent  -NS     Time Frame (Bed Mobility Goal 1, PT) short term goal (STG);1 week  -[facility] Name 10/15/24 0750          Transfer Goal 1 (PT)    Activity/Assistive Device (Transfer Goal 1, PT) sit-to-stand/stand-to-sit;bed-to-chair/chair-to-bed  -NS     [place] Level/Cues Needed (Transfer Goal 1, PT) independent  -NS     Time Frame (Transfer Goal 1, PT) long term goal (LTG);10 days  -[facility] Name 10/15/24 [facility]          Gait Training Goal 1 (PT)    Activity/Assistive Device (Gait Training Goal 1, PT) gait (walking locomotion);increase energy conservation;increase endurance/gait distance  -NS     [place] Level (Gait Training Goal  1, PT) independent  -NS     Distance (Gait Training Goal 1, PT) 100  -NS     Time Frame (Gait Training Goal 1, PT) long term goal (LTG);10 days  -NS       Row Name 10/15/24 0750          Stairs Goal 1 (PT)    Activity/Assistive Device (Stairs Goal 1, PT) ascending stairs;descending stairs  -NS     [place] Level/Cues Needed (Stairs Goal 1, PT) standby assist  -NS     Number of Stairs (Stairs Goal 1, PT) 5  -NS     Time Frame (Stairs Goal 1, PT) long term goal (LTG);10 days  -NS       Row Name 10/15/24 0750          Therapy Assessment/Plan (PT)    Planned Therapy Interventions (PT) balance training;bed mobility training;gait training;home exercise program;neuromuscular re-education;strengthening;patient/family education;stair training;transfer training  -NS               User Key  (r) = Recorded By, (t) = Taken By, (c) = Cosigned By      Initials Name Provider Type    Gema Nieves, PT Physical Therapist                   Clinical Impression       Row Name 10/15/24 [facility]          Pain    Pretreatment Pain Rating 0/10 - no pain  -NS     Posttreatment Pain Rating 0/10 - no pain  -NS       Row Name 10/15/24 [facility]          Plan of Care Review    Plan of Care Reviewed With patient  -NS     Progress no change  -NS     Outcome Evaluation Patient presents with weakness, dyspnea on exertion, and decreased functional endurance affecting mobility. She desatted to 76% on 6L O2 following short ambulation distance in the room. Skilled IP PT services warranted. Anticipate that pt will be appropriate to return home with assist and OP pulmonary rehab at discharge.  -NS       Row Name 10/15/24 [facility]0          Therapy Assessment/Plan (PT)    Patient/Family Therapy Goals Statement (PT) return to PLOF  -NS     Rehab Potential (PT) fair  -NS     Criteria for Skilled Interventions Met (PT) yes;meets criteria;skilled treatment is necessary  -NS     Therapy Frequency (PT) daily  -NS     Predicted Duration of Therapy Intervention (PT) 10  days  -NS       Row Name 10/15/24 0750          Vital Signs    Pre Systolic BP Rehab 144  -NS     Pre Treatment Diastolic BP 68  -NS     Pretreatment Heart Rate (beats/min) 73  -NS     Posttreatment Heart Rate (beats/min) 69  -NS     Pre SpO2 (%) 91  -NS     O2 Delivery Pre Treatment nasal cannula  5L  -NS     Intra SpO2 (%) 76  -NS     O2 Delivery Intra Treatment nasal cannula  6L  -NS     Post SpO2 (%) 91  -NS     O2 Delivery Post Treatment nasal cannula  6L  -NS     Pre Patient Position Supine  -NS     Intra Patient Position Standing  -NS     Post Patient Position Sitting  -NS       Row Name 10/15/24 0750          Positioning and Restraints    Pre-Treatment Position in bed  -NS     Post Treatment Position chair  -NS     In Chair notified nsg;call light within reach;encouraged to call for assist;exit alarm on;waffle cushion;sitting  -NS               User Key  (r) = Recorded By, (t) = Taken By, (c) = Cosigned By      Initials Name Provider Type    Gema Nieves, RETA Physical Therapist                   Outcome Measures       Row Name 10/15/24 0800 10/15/24 0750       How much help from another person do you currently need...    Turning from your back to your side while in flat bed without using bedrails? 4  -SP 4  -NS    Moving from lying on back to sitting on the side of a flat bed without bedrails? 4  -SP 3  -NS    Moving to and from a bed to a chair (including a wheelchair)? 4  -SP 3  -NS    Standing up from a chair using your arms (e.g., wheelchair, bedside chair)? 4  -SP 3  -NS    Climbing 3-5 steps with a railing? 3  -SP 2  -NS    To walk in hospital room? 4  -SP 3  -NS    AM-PAC 6 Clicks Score (PT) 23  -SP 18  -NS    Highest Level of Mobility Goal 7 --> Walk 25 feet or more  -SP 6 --> Walk 10 steps or more  -NS      Row Name 10/15/24 0750          Functional Assessment    Outcome Measure Options AM-PAC 6 Clicks Basic Mobility (PT)  -NS               User Key  (r) = Recorded By, (t) = Taken By, (c) =  Cosigned By      Initials Name Provider Type    Gema Nieves, PT Physical Therapist    Sakshi Booker, RN Registered Nurse                                 Physical Therapy Education       Title: PT OT SLP Therapies (In Progress)       Topic: Physical Therapy (In Progress)       Point: Mobility training (Done)       Learning Progress Summary            Patient Acceptance, E, VU by NS at 10/15/2024 1007    Comment: PLB, importance of monitoring O2 sat                      Point: Home exercise program (Not Started)       Learner Progress:  Not documented in this visit.              Point: Body mechanics (Done)       Learning Progress Summary            Patient Acceptance, E, VU by NS at 10/15/2024 1007    Comment: PLB, importance of monitoring O2 sat                      Point: Precautions (Done)       Learning Progress Summary            Patient Acceptance, E, VU by NS at 10/15/2024 1007    Comment: PLB, importance of monitoring O2 sat                                      User Key       Initials Effective Dates Name Provider Type Discipline    NS 06/16/21 -  Gema Ruiz PT Physical Therapist PT                  PT Recommendation and Plan  Planned Therapy Interventions (PT): balance training, bed mobility training, gait training, home exercise program, neuromuscular re-education, strengthening, patient/family education, stair training, transfer training  Progress: no change  Outcome Evaluation: Patient presents with weakness, dyspnea on exertion, and decreased functional endurance affecting mobility. She desatted to 76% on 6L O2 following short ambulation distance in the room. Skilled IP PT services warranted. Anticipate that pt will be appropriate to return home with assist and OP pulmonary rehab at discharge.     Time Calculation:   PT Evaluation Complexity  History, PT Evaluation Complexity: 3 or more personal factors and/or comorbidities  Examination of Body Systems (PT Eval Complexity): total of 4  or more elements  Clinical Presentation (PT Evaluation Complexity): evolving  Clinical Decision Making (PT Evaluation Complexity): moderate complexity  Overall Complexity (PT Evaluation Complexity): moderate complexity     PT Charges       Row Name 10/15/24 0750             Time Calculation    Start Time 0750  -NS      PT Received On 10/15/24  -NS      PT Goal Re-Cert Due Date 10/25/24  -NS         Untimed Charges    PT Eval/Re-eval Minutes 46  -NS         Total Minutes    Untimed Charges Total Minutes 46  -NS       Total Minutes 46  -NS                User Key  (r) = Recorded By, (t) = Taken By, (c) = Cosigned By      Initials Name Provider Type    Gema Nieves, RETA Physical Therapist                  Therapy Charges for Today       Code Description Service Date Service Provider Modifiers Qty    71795523504 HC PT EVAL MOD COMPLEXITY 4 10/15/2024 Gema Ruiz, PT GP 1            PT G-Codes  Outcome Measure Options: AM-PAC 6 Clicks Basic Mobility (PT)  AM-PAC 6 Clicks Score (PT): 23  PT Discharge Summary  Anticipated Discharge Disposition (PT): home with assist, home with outpatient therapy services (pulmonary rehab)    Gema Ruiz PT  10/15/2024

## 2024-10-15 NOTE — PLAN OF CARE
Goal Outcome Evaluation:  Plan of Care Reviewed With: patient        Progress: no change  Outcome Evaluation: Patient presents with weakness, dyspnea on exertion, and decreased functional endurance affecting mobility. She desatted to 76% on 6L O2 following short ambulation distance in the room. Skilled IP PT services warranted. Anticipate that pt will be appropriate to return home with assist and OP pulmonary rehab at discharge.    Anticipated Discharge Disposition (PT): home with assist, home with outpatient therapy services (pulmonary rehab)

## 2024-10-16 LAB
ANION GAP SERPL CALCULATED.3IONS-SCNC: 7 MMOL/L (ref 5–15)
BUN SERPL-MCNC: 17 MG/DL (ref 8–23)
BUN/CREAT SERPL: 25 (ref 7–25)
CALCIUM SPEC-SCNC: 8.4 MG/DL (ref 8.6–10.5)
CHLORIDE SERPL-SCNC: 95 MMOL/L (ref 98–107)
CO2 SERPL-SCNC: 36 MMOL/L (ref 22–29)
CREAT SERPL-MCNC: 0.68 MG/DL (ref 0.57–1)
DEPRECATED RDW RBC AUTO: 54.1 FL (ref 37–54)
EGFRCR SERPLBLD CKD-EPI 2021: 94.4 ML/MIN/1.73
ERYTHROCYTE [DISTWIDTH] IN BLOOD BY AUTOMATED COUNT: 14.7 % (ref 12.3–15.4)
GLUCOSE SERPL-MCNC: 128 MG/DL (ref 65–99)
HCT VFR BLD AUTO: 45.1 % (ref 34–46.6)
HGB BLD-MCNC: 13.6 G/DL (ref 12–15.9)
MCH RBC QN AUTO: 30.2 PG (ref 26.6–33)
MCHC RBC AUTO-ENTMCNC: 30.2 G/DL (ref 31.5–35.7)
MCV RBC AUTO: 100.2 FL (ref 79–97)
PLATELET # BLD AUTO: 145 10*3/MM3 (ref 140–450)
PMV BLD AUTO: 10.3 FL (ref 6–12)
POTASSIUM SERPL-SCNC: 4.6 MMOL/L (ref 3.5–5.2)
RBC # BLD AUTO: 4.5 10*6/MM3 (ref 3.77–5.28)
SODIUM SERPL-SCNC: 138 MMOL/L (ref 136–145)
WBC NRBC COR # BLD AUTO: 9.41 10*3/MM3 (ref 3.4–10.8)

## 2024-10-16 PROCEDURE — 63710000001 PREDNISONE PER 1 MG: Performed by: NURSE PRACTITIONER

## 2024-10-16 PROCEDURE — 99232 SBSQ HOSP IP/OBS MODERATE 35: CPT | Performed by: INTERNAL MEDICINE

## 2024-10-16 PROCEDURE — 94799 UNLISTED PULMONARY SVC/PX: CPT

## 2024-10-16 PROCEDURE — 94761 N-INVAS EAR/PLS OXIMETRY MLT: CPT

## 2024-10-16 PROCEDURE — 94664 DEMO&/EVAL PT USE INHALER: CPT

## 2024-10-16 PROCEDURE — 80048 BASIC METABOLIC PNL TOTAL CA: CPT | Performed by: INTERNAL MEDICINE

## 2024-10-16 PROCEDURE — 25010000002 HEPARIN (PORCINE) PER 1000 UNITS: Performed by: INTERNAL MEDICINE

## 2024-10-16 PROCEDURE — 85027 COMPLETE CBC AUTOMATED: CPT | Performed by: INTERNAL MEDICINE

## 2024-10-16 RX ADMIN — GABAPENTIN 800 MG: 400 CAPSULE ORAL at 05:14

## 2024-10-16 RX ADMIN — IPRATROPIUM BROMIDE AND ALBUTEROL SULFATE 3 ML: 2.5; .5 SOLUTION RESPIRATORY (INHALATION) at 16:04

## 2024-10-16 RX ADMIN — TRAZODONE HYDROCHLORIDE 100 MG: 100 TABLET ORAL at 21:12

## 2024-10-16 RX ADMIN — HEPARIN SODIUM 5000 UNITS: 5000 INJECTION INTRAVENOUS; SUBCUTANEOUS at 13:51

## 2024-10-16 RX ADMIN — Medication 10 ML: at 21:12

## 2024-10-16 RX ADMIN — ARFORMOTEROL TARTRATE 15 MCG: 15 SOLUTION RESPIRATORY (INHALATION) at 19:43

## 2024-10-16 RX ADMIN — AZITHROMYCIN DIHYDRATE 500 MG: 250 TABLET ORAL at 08:11

## 2024-10-16 RX ADMIN — GABAPENTIN 800 MG: 400 CAPSULE ORAL at 13:51

## 2024-10-16 RX ADMIN — HEPARIN SODIUM 5000 UNITS: 5000 INJECTION INTRAVENOUS; SUBCUTANEOUS at 21:12

## 2024-10-16 RX ADMIN — IPRATROPIUM BROMIDE AND ALBUTEROL SULFATE 3 ML: 2.5; .5 SOLUTION RESPIRATORY (INHALATION) at 19:43

## 2024-10-16 RX ADMIN — Medication 10 ML: at 08:13

## 2024-10-16 RX ADMIN — IPRATROPIUM BROMIDE AND ALBUTEROL SULFATE 3 ML: 2.5; .5 SOLUTION RESPIRATORY (INHALATION) at 09:26

## 2024-10-16 RX ADMIN — IPRATROPIUM BROMIDE AND ALBUTEROL SULFATE 3 ML: 2.5; .5 SOLUTION RESPIRATORY (INHALATION) at 13:20

## 2024-10-16 RX ADMIN — ARFORMOTEROL TARTRATE 15 MCG: 15 SOLUTION RESPIRATORY (INHALATION) at 09:25

## 2024-10-16 RX ADMIN — PREDNISONE 40 MG: 20 TABLET ORAL at 08:11

## 2024-10-16 RX ADMIN — NICOTINE 1 PATCH: 21 PATCH TRANSDERMAL at 08:13

## 2024-10-16 RX ADMIN — BISOPROLOL FUMARATE 10 MG: 5 TABLET ORAL at 08:11

## 2024-10-16 RX ADMIN — HYDROCHLOROTHIAZIDE 6.25 MG: 25 TABLET ORAL at 08:11

## 2024-10-16 RX ADMIN — TIOTROPIUM BROMIDE INHALATION SPRAY 2 PUFF: 3.12 SPRAY, METERED RESPIRATORY (INHALATION) at 09:41

## 2024-10-16 RX ADMIN — HEPARIN SODIUM 5000 UNITS: 5000 INJECTION INTRAVENOUS; SUBCUTANEOUS at 05:13

## 2024-10-16 RX ADMIN — PANTOPRAZOLE SODIUM 40 MG: 40 TABLET, DELAYED RELEASE ORAL at 05:14

## 2024-10-16 RX ADMIN — GABAPENTIN 800 MG: 400 CAPSULE ORAL at 21:11

## 2024-10-16 NOTE — PROGRESS NOTES
[facility] Medicine Services  PROGRESS NOTE    Patient Name: Elvi Keita  : 1955  MRN: [id number]    Date of Admission: 10/14/2024  Primary Care Physician: Maria Alejandra West APRN    Subjective   Subjective     CC:  Dyspnea     HPI:  No acute events. States that she is feeling better period. Reviewed trilogy for home      Objective   Objective     Vital Signs:   Temp:  [97.6 °F (36.4 °C)-98.2 °F (36.8 °C)] 97.8 °F (36.6 °C)  Heart Rate:  [59-87] 69  Resp:  [18-20] 20  BP: (126-160)/(44-75) 146/59  Flow (L/min) (Oxygen Therapy):  [4.5-6] 4.5     Physical Exam:  Constitutional: chronically ill   Respiratory: normal effort; diminished; exp wheeze   Cardiovascular: RRR, no murmurs  Gastrointestinal: Positive bowel sounds, soft, nontender, nondistended  Musculoskeletal: No bilateral ankle edema  Psychiatric: Appropriate affect, cooperative  Neurologic: Oriented x 3, strength symmetric in all extremities, Cranial Nerves grossly intact to confrontation, speech clear      Results Reviewed:  LAB RESULTS:      Lab 10/16/24  0420 10/15/24  0350 10/14/24  1238   WBC 9.41 4.79 8.19   HEMOGLOBIN 13.6 13.9 15.0   HEMATOCRIT 45.1 45.7 49.1*   PLATELETS 145 141 155   NEUTROS ABS  --   --  6.19   IMMATURE GRANS (ABS)  --   --  0.04   LYMPHS ABS  --   --  1.37   MONOS ABS  --   --  0.45   EOS ABS  --   --  0.09   .2* 100.9* 102.9*   CRP  --   --  0.89*   LACTATE  --   --  0.9   D DIMER QUANT  --   --  0.40   HSTROP T  --   --  9         Lab 10/16/24  0420 10/15/24  0350 10/14/24  1238   SODIUM 138 141 140   POTASSIUM 4.6 4.3 4.5   CHLORIDE 95* 98 95*   CO2 36.0* 39.0* 41.0*   ANION GAP 7.0 4.0* 4.0*   BUN 17 9 7*   CREATININE 0.68 0.65 0.73   EGFR 94.4 95.4 89.2   GLUCOSE 128* 128* 115*   CALCIUM 8.4* 8.7 8.9   MAGNESIUM  --   --  1.7         Lab 10/14/24  1238   TOTAL PROTEIN 6.5   ALBUMIN 4.0   GLOBULIN 2.5   ALT (SGPT) 8   AST (SGOT) 12   BILIRUBIN 0.4   ALK PHOS 101         Lab 10/14/24  1238    PROBNP 616.9   HSTROP T 9                 Lab 10/14/24  1542 10/14/24  1425   PH, ARTERIAL 7.328* 7.306*   PCO2, ARTERIAL 76.9* 82.9*   PO2 ART 55.8* 62.5*   FIO2 30 21   HCO3 ART 40.4* 41.3*   BASE EXCESS ART 10.6* 10.8*   CARBOXYHEMOGLOBIN 5.5* 5.9*     Brief Urine Lab Results  (Last result in the past 365 days)        Color   Clarity   Blood   Leuk Est   Nitrite   Protein   CREAT   Urine HCG        10/14/24 1518 Yellow   Clear   Negative   Negative   Negative   Negative                   Microbiology Results Abnormal       Procedure Component Value - Date/Time    Blood Culture - Blood, Arm, Left [[id number]]  (Normal) Collected: 10/14/24 1310    Lab Status: Preliminary result Specimen: Blood from Arm, Left Updated: 10/16/24 1345     Blood Culture No growth at 2 days    Narrative:      Less than seven (7) mL's of blood was collected.  Insufficient quantity may yield false negative results.    Blood Culture - Blood, Arm, Right [[id number]]  (Normal) Collected: 10/14/24 1255    Lab Status: Preliminary result Specimen: Blood from Arm, Right Updated: 10/16/24 1345     Blood Culture No growth at 2 days    Narrative:      Less than seven (7) mL's of blood was collected.  Insufficient quantity may yield false negative results.    COVID PRE-OP / PRE-PROCEDURE SCREENING ORDER (NO ISOLATION) - Swab, Nasopharynx [[id number]]  (Normal) Collected: 10/14/24 1323    Lab Status: Final result Specimen: Swab from Nasopharynx Updated: 10/14/24 1411    Narrative:      The following orders were created for panel order COVID PRE-OP / PRE-PROCEDURE SCREENING ORDER (NO ISOLATION) - Swab, Nasopharynx.  Procedure                               Abnormality         Status                     ---------                               -----------         ------                     COVID-19, FLU A/B, RSV P...[[id number]]  Normal              Final result                 Please view results for these tests on the individual orders.    COVID-19, FLU  A/B, RSV PCR 1 HR TAT - Swab, Nasopharynx [[id number]]  (Normal) Collected: 10/14/24 1323    Lab Status: Final result Specimen: Swab from Nasopharynx Updated: 10/14/24 1411     COVID19 Not Detected     Influenza A PCR Not Detected     Influenza B PCR Not Detected     RSV, PCR Not Detected    Narrative:      Fact sheet for providers: https://www.fda.gov/media/333886/download    Fact sheet for patients: https://www.fda.gov/media/220615/download    Test performed by PCR.            No radiology results from the last 24 hrs    Results for orders placed during the hospital encounter of 08/30/24    Adult Transthoracic Echo Complete W/ Cont if Necessary Per Protocol    Interpretation Summary    Left ventricular ejection fraction appears to be greater than 70%.    Left ventricular wall thickness is consistent with mild posterior asymmetric hypertrophy.    The right ventricular cavity is mild to moderately dilated.    The left atrial cavity is mildly dilated.    Left atrial volume is mildly increased.    The right atrial cavity is moderately  dilated.    Estimated right ventricular systolic pressure from tricuspid regurgitation is normal (<35 mmHg).      Current medications:  Scheduled Meds:arformoterol, 15 mcg, Nebulization, BID - RT   And  tiotropium bromide monohydrate, 2 puff, Inhalation, Daily - RT  bisoprolol, 10 mg, Oral, Daily   And  hydroCHLOROthiazide, 6.25 mg, Oral, Daily  gabapentin, 800 mg, Oral, Q8H  heparin (porcine), 5,000 Units, Subcutaneous, Q8H  ipratropium-albuterol, 3 mL, Nebulization, 4x Daily - RT  nicotine, 1 patch, Transdermal, Q24H  pantoprazole, 40 mg, Oral, QAM AC  predniSONE, 40 mg, Oral, Daily With Breakfast  sodium chloride, 10 mL, Intravenous, Q12H  traZODone, 100 mg, Oral, Nightly      Continuous Infusions:   PRN Meds:.  acetaminophen **OR** acetaminophen **OR** acetaminophen    senna-docusate sodium **AND** polyethylene glycol **AND** bisacodyl **AND** bisacodyl    calcium carbonate     Calcium Replacement - Follow Nurse / BPA Driven Protocol    Magnesium Standard Dose Replacement - Follow Nurse / BPA Driven Protocol    nicotine polacrilex    nitroglycerin    Phosphorus Replacement - Follow Nurse / BPA Driven Protocol    Potassium Replacement - Follow Nurse / BPA Driven Protocol    promethazine **OR** promethazine    sodium chloride    Assessment & Plan   Assessment & Plan     Active Hospital Problems    Diagnosis  POA    **COPD with exacerbation [J44.1]  Yes    Chronic hypoxemic respiratory failure [J96.11]  Yes    Nicotine dependence [F17.200]  Yes      Resolved Hospital Problems   No resolved problems to display.        Brief Hospital Course to date:  Elvi Keita is a 69 y.o. female with PMH significant for COPD, HTN who is chronically on 4 L oxygen and who continues to smoke presented to [facility] ED on 10/14/2024 for increased shortness of breath over the last couple weeks.      COPD exacerbation  Chronic hypoxic hypercapnic respiratory failure   Nicotine dependence  - ddimer negative  -Zithromax x 3 days  -s/p solumedrol.  Continue prednisone  -DuoNebs scheduled  - Continue home inhalers   -NRT  -Tobacco cessation education  -Consider Pulmonology consult pending improvement.   - Initially on 6L. Weaned down to 4L.   - Required BiPAP on admission. Recommend to continue BiPAP nightly and when sleeping during the day. Good candidate for Trilogy machine with ABG and significant improvement with bipap. Due to her most recent ABG revealing a PaCO2 of 76.9, the patient is at risk of worsening Chronic Respiratory Failure. Bilevel/RAD has been tried and failed. Additionally, patient requires continuous alarms, backup battery and portability which are not possible with BiLevel/Rad devices. I am prescribing Non-Invasive Ventilation therapy to decrease the chance of continued unplanned expensive medical encounters, and worsening of the patient's condition.     HTN  -Home meds    Expected Discharge Location and  Transportation: home  Expected Discharge   Expected Discharge Date: 10/17/2024; Expected Discharge Time:      VTE Prophylaxis:  Pharmacologic & mechanical VTE prophylaxis orders are present.         AM-PAC 6 Clicks Score (PT): 23 (10/16/24 0800)    CODE STATUS:   Code Status and Medical Interventions: CPR (Attempt to Resuscitate); Full Support   Ordered at: 10/14/24 8406     Level Of Support Discussed With:    Patient     Code Status (Patient has no pulse and is not breathing):    CPR (Attempt to Resuscitate)     Medical Interventions (Patient has pulse or is breathing):    Full Support       Cherelle Lucas DO  10/16/24

## 2024-10-16 NOTE — PLAN OF CARE
Goal Outcome Evaluation:              Outcome Evaluation: Pt is A&O with VSS, NSR on the monitor, and on 6L NC. She complained of having acid reflux and stated she takes something at home for it but doesn't have it ordered here so the APRN on call with hospitalist ordered scheduled PO protonix and she stated that helped after awhile. She received scheduled sleep medication last night but still complained of not being able to sleep well. She refused to wear her bipap last night. There are no other complaints at this time.

## 2024-10-16 NOTE — PLAN OF CARE
Goal Outcome Evaluation:            Pt A&Ox4. VSS. 4.5L NC. NSR per monitor. No complaints of pain/soa. In the bed with alarm set and call light in reach. POC ongoing.

## 2024-10-16 NOTE — CASE MANAGEMENT/SOCIAL WORK
Continued Stay Note  [facility]     Patient Name: Elvi Keita  MRN: [id number]  Today's Date: 10/16/2024    Admit Date: 10/14/2024    Plan: discharge plan   Discharge Plan       Row Name 10/16/24 1242       Plan    Plan discharge plan    Plan Comments Per discussion in MDR, possibly medically ready for discharge tomorrow(Thurs). Plan is home with family. CM is working on trilogy through [employer]. CM will cont to follow    Final Discharge Disposition Code 01 - home or self-care                   Discharge Codes    No documentation.                 Expected Discharge Date and Time       Expected Discharge Date Expected Discharge Time    Oct 17, 2024               Xena Hemphill RN

## 2024-10-17 ENCOUNTER — READMISSION MANAGEMENT (OUTPATIENT)
Dept: CALL CENTER | Facility: HOSPITAL | Age: 69
End: 2024-10-17
Payer: MEDICARE

## 2024-10-17 VITALS
HEIGHT: 64 IN | WEIGHT: 160 LBS | RESPIRATION RATE: 18 BRPM | TEMPERATURE: 98.4 F | OXYGEN SATURATION: 92 % | SYSTOLIC BLOOD PRESSURE: 148 MMHG | HEART RATE: 58 BPM | DIASTOLIC BLOOD PRESSURE: 63 MMHG | BODY MASS INDEX: 27.31 KG/M2

## 2024-10-17 PROBLEM — J44.1 COPD WITH EXACERBATION: Status: RESOLVED | Noted: 2024-08-30 | Resolved: 2024-10-17

## 2024-10-17 PROCEDURE — 94664 DEMO&/EVAL PT USE INHALER: CPT

## 2024-10-17 PROCEDURE — 63710000001 PREDNISONE PER 1 MG: Performed by: NURSE PRACTITIONER

## 2024-10-17 PROCEDURE — 94799 UNLISTED PULMONARY SVC/PX: CPT

## 2024-10-17 PROCEDURE — 25010000002 HEPARIN (PORCINE) PER 1000 UNITS: Performed by: INTERNAL MEDICINE

## 2024-10-17 PROCEDURE — 99239 HOSP IP/OBS DSCHRG MGMT >30: CPT | Performed by: NURSE PRACTITIONER

## 2024-10-17 PROCEDURE — 94761 N-INVAS EAR/PLS OXIMETRY MLT: CPT

## 2024-10-17 RX ORDER — NICOTINE 21 MG/24HR
1 PATCH, TRANSDERMAL 24 HOURS TRANSDERMAL
Qty: 30 PATCH | Refills: 0 | Status: SHIPPED | OUTPATIENT
Start: 2024-10-18

## 2024-10-17 RX ORDER — HYDROCHLOROTHIAZIDE 12.5 MG/1
6.25 TABLET ORAL DAILY
Qty: 15 TABLET | Refills: 0 | Status: SHIPPED | OUTPATIENT
Start: 2024-10-18

## 2024-10-17 RX ORDER — ACETAMINOPHEN 325 MG/1
650 TABLET ORAL EVERY 6 HOURS PRN
Start: 2024-10-17

## 2024-10-17 RX ORDER — PREDNISONE 20 MG/1
40 TABLET ORAL
Qty: 4 TABLET | Refills: 0 | Status: SHIPPED | OUTPATIENT
Start: 2024-10-18 | End: 2024-10-20

## 2024-10-17 RX ADMIN — NICOTINE 1 PATCH: 21 PATCH TRANSDERMAL at 08:14

## 2024-10-17 RX ADMIN — PREDNISONE 40 MG: 20 TABLET ORAL at 08:12

## 2024-10-17 RX ADMIN — TIOTROPIUM BROMIDE INHALATION SPRAY 2 PUFF: 3.12 SPRAY, METERED RESPIRATORY (INHALATION) at 08:56

## 2024-10-17 RX ADMIN — PANTOPRAZOLE SODIUM 40 MG: 40 TABLET, DELAYED RELEASE ORAL at 05:45

## 2024-10-17 RX ADMIN — HEPARIN SODIUM 5000 UNITS: 5000 INJECTION INTRAVENOUS; SUBCUTANEOUS at 05:45

## 2024-10-17 RX ADMIN — Medication 10 ML: at 08:14

## 2024-10-17 RX ADMIN — IPRATROPIUM BROMIDE AND ALBUTEROL SULFATE 3 ML: 2.5; .5 SOLUTION RESPIRATORY (INHALATION) at 08:56

## 2024-10-17 RX ADMIN — HYDROCHLOROTHIAZIDE 6.25 MG: 25 TABLET ORAL at 08:12

## 2024-10-17 RX ADMIN — GABAPENTIN 800 MG: 400 CAPSULE ORAL at 05:45

## 2024-10-17 RX ADMIN — ARFORMOTEROL TARTRATE 15 MCG: 15 SOLUTION RESPIRATORY (INHALATION) at 08:57

## 2024-10-17 RX ADMIN — BISOPROLOL FUMARATE 10 MG: 5 TABLET ORAL at 08:12

## 2024-10-17 NOTE — DISCHARGE SUMMARY
[facility] Medicine Services  DISCHARGE SUMMARY    Patient Name: Elvi Keita  : 1955  MRN: [id number]    Date of Admission: 10/14/2024  Date of Discharge:  10/17/2024  Primary Care Physician: Maria Alejandra West APRN    Consults       No orders found from 9/15/2024 to 10/15/2024.            Hospital Course     Presenting Problem:   COPD with acute exacerbation [J44.1]    Active Hospital Problems    Diagnosis  POA    Chronic hypoxemic respiratory failure [J96.11]  Yes    Nicotine dependence [F17.200]  Yes      Resolved Hospital Problems    Diagnosis Date Resolved POA    **COPD with exacerbation [J44.1] 10/17/2024 Yes      Hospital Course:  Elvi Keita is a 69 y.o. female PMH COPD (chronically on 4L oxygen who continues to smoke), HTN who presented with increased shortness of breath over several weeks.  Patient found with COPD exacerbation.  Patient is s/p azithromycin.  Patient received steroid therapy with scheduled neb treatments with improvement.  Patient reported to be a good candidate for trilogy machine with ABG showing significant improvement with the BiPAP that was used on arrival.  ABG revealing PaCO2 76.9, making the patient at risk for worsening chronic respiratory failure.  Bilevel/RAD was tried but failed.    Assessment/Plan  Exacerbation-resolved  Chronic hypoxic hypercapnic respiratory failure  Nicotine dependence  - D-dimer negative  - Azithromycin x 3 days  - S/p Solu-Medrol transition to prednisone  - DuoNebs scheduled  - Home inhalers  - NRT  - Tobacco cessation education  - Pulmonology outpatient consult  - Initially on 6 L weaned down to 3L  - Required BiPAP on admission, recommend to continue BiPAP nightly and when sleeping during the day.  Patient is good candidate for trilogy machine with ABG showing significant improvement with BiPAP.  Most recent ABG revealing PaCO2 76.9, patient at risk for worsening chronic respiratory failure.  Bilevel/RAD has been tried and  "failed.    HTN  - Home meds      Discharge Follow Up Recommendations for labs/diagnostics:  Follow up with PCP in 1 week  Follow up with [facility] pulmonology in 1 week    Day of Discharge     HPI:   Pt sitting up in the bed reporting she feels better than on arrival. O2 sat 94% on 3L NC. She reports occasional cough.       Otherwise ROS is negative except as mentioned in the HPI.    Vital Signs:   Temp:  [97.7 °F (36.5 °C)-98.4 °F (36.9 °C)] 98.4 °F (36.9 °C)  Heart Rate:  [56-84] 58  Resp:  [18-20] 18  BP: (128-152)/(57-65) 140/58     Physical Exam:  Constitutional: Awake, alert, NAD  HENT: NCAT, mucous membranes moist  Respiratory: dim in bases with occasional E/W, nonlabored  Cardiovascular: RRR, no murmurs, rubs, or gallops  Gastrointestinal: Positive bowel sounds, soft, nontender, nondistended  Musculoskeletal: No bilateral ankle edema  Psychiatric: Appropriate affect, cooperative  Neurologic: Oriented x 3, strength symmetric in all extremities, Cranial Nerves grossly intact to confrontation, speech clear  Skin: No rashes      Pertinent  and/or Most Recent Results     Results from last 7 days   Lab Units 10/16/24  0420 10/15/24  0350 10/14/24  1238   WBC 10*3/mm3 9.41 4.79 8.19   HEMOGLOBIN g/dL 13.6 13.9 15.0   HEMATOCRIT % 45.1 45.7 49.1*   PLATELETS 10*3/mm3 145 141 155   SODIUM mmol/L 138 141 140   POTASSIUM mmol/L 4.6 4.3 4.5   CHLORIDE mmol/L 95* 98 95*   CO2 mmol/L 36.0* 39.0* 41.0*   BUN mg/dL 17 9 7*   CREATININE mg/dL 0.68 0.65 0.73   GLUCOSE mg/dL 128* 128* 115*   CALCIUM mg/dL 8.4* 8.7 8.9     Results from last 7 days   Lab Units 10/14/24  1238   BILIRUBIN mg/dL 0.4   ALK PHOS U/L 101   ALT (SGPT) U/L 8   AST (SGOT) U/L 12           Invalid input(s): \"TG\", \"LDLCALC\", \"LDLREALC\"  Results from last 7 days   Lab Units 10/14/24  1238   PROBNP pg/mL 616.9   HSTROP T ng/L 9   LACTATE mmol/L 0.9       Brief Urine Lab Results  (Last result in the past 365 days)        Color   Clarity   Blood   Leuk Est   " Nitrite   Protein   CREAT   Urine HCG        10/14/24 1518 Yellow   Clear   Negative   Negative   Negative   Negative                   Microbiology Results Abnormal       Procedure Component Value - Date/Time    Blood Culture - Blood, Arm, Left [[id number]]  (Normal) Collected: 10/14/24 1310    Lab Status: Preliminary result Specimen: Blood from Arm, Left Updated: 10/16/24 1345     Blood Culture No growth at 2 days    Narrative:      Less than seven (7) mL's of blood was collected.  Insufficient quantity may yield false negative results.    Blood Culture - Blood, Arm, Right [[id number]]  (Normal) Collected: 10/14/24 1255    Lab Status: Preliminary result Specimen: Blood from Arm, Right Updated: 10/16/24 1345     Blood Culture No growth at 2 days    Narrative:      Less than seven (7) mL's of blood was collected.  Insufficient quantity may yield false negative results.    COVID PRE-OP / PRE-PROCEDURE SCREENING ORDER (NO ISOLATION) - Swab, Nasopharynx [[id number]]  (Normal) Collected: 10/14/24 1323    Lab Status: Final result Specimen: Swab from Nasopharynx Updated: 10/14/24 1411    Narrative:      The following orders were created for panel order COVID PRE-OP / PRE-PROCEDURE SCREENING ORDER (NO ISOLATION) - Swab, Nasopharynx.  Procedure                               Abnormality         Status                     ---------                               -----------         ------                     COVID-19, FLU A/B, RSV P...[[id number]]  Normal              Final result                 Please view results for these tests on the individual orders.    COVID-19, FLU A/B, RSV PCR 1 HR TAT - Swab, Nasopharynx [[id number]]  (Normal) Collected: 10/14/24 1323    Lab Status: Final result Specimen: Swab from Nasopharynx Updated: 10/14/24 1411     COVID19 Not Detected     Influenza A PCR Not Detected     Influenza B PCR Not Detected     RSV, PCR Not Detected    Narrative:      Fact sheet for providers:  https://www.fda.gov/media/508450/download    Fact sheet for patients: https://www.fda.gov/media/816172/download    Test performed by PCR.            Imaging Results (All)       Procedure Component Value Units Date/Time    XR Chest 1 View [[id number]] Collected: 10/14/24 1323     Updated: 10/14/24 1327    Narrative:      XR CHEST 1 VW    Date of Exam: 10/14/2024 12:53 PM EDT    Indication: SOA triage protocol    Comparison: 9/2/2024    Findings:  Cardiomediastinal silhouette is unremarkable.  No airspace disease, pneumothorax, nor pleural effusion. The lungs are emphysematous with chronic interstitial changes. No acute osseous abnormality identified.      Impression:      Impression:  Stable appearance of the chest with emphysema and chronic interstitial changes.      Electronically Signed: Claudia Romero MD    10/14/2024 1:24 PM EDT    Workstation ID: [id number]                    Results for orders placed during the hospital encounter of 08/30/24    Adult Transthoracic Echo Complete W/ Cont if Necessary Per Protocol    Interpretation Summary    Left ventricular ejection fraction appears to be greater than 70%.    Left ventricular wall thickness is consistent with mild posterior asymmetric hypertrophy.    The right ventricular cavity is mild to moderately dilated.    The left atrial cavity is mildly dilated.    Left atrial volume is mildly increased.    The right atrial cavity is moderately  dilated.    Estimated right ventricular systolic pressure from tricuspid regurgitation is normal (<35 mmHg).      Pending Labs       Order Current Status    Blood Culture - Blood, Arm, Left Preliminary result    Blood Culture - Blood, Arm, Right Preliminary result          Discharge Details        Discharge Medications        New Medications        Instructions Start Date   acetaminophen 325 MG tablet  Commonly known as: TYLENOL   650 mg, Oral, Every 6 Hours PRN      nicotine 21 MG/24HR patch  Commonly known as: NICODERM CQ   1  patch, Transdermal, Every 24 Hours Scheduled   Start Date: October 18, 2024     nicotine polacrilex 4 MG gum  Commonly known as: NICORETTE   4 mg, Mouth/Throat, Every 1 Hour PRN      predniSONE 20 MG tablet  Commonly known as: DELTASONE   40 mg, Oral, Daily With Breakfast   Start Date: October 18, 2024            Changes to Medications        Instructions Start Date   hydroCHLOROthiazide 12.5 MG tablet  What changed:   how much to take  when to take this   6.25 mg, Oral, Daily   Start Date: October 18, 2024            Continue These Medications        Instructions Start Date   albuterol sulfate  (90 Base) MCG/ACT inhaler  Commonly known as: PROVENTIL HFA;VENTOLIN HFA;PROAIR HFA   2 puffs, Every 6 Hours PRN      Anoro Ellipta 62.5-25 MCG/ACT aerosol powder  inhaler  Generic drug: Umeclidinium-Vilanterol   1 puff, Daily - RT      bisoprolol-hydrochlorothiazide 10-6.25 MG per tablet  Commonly known as: ZIAC   1 tablet, Daily      esomeprazole 40 MG capsule  Commonly known as: nexIUM   40 mg, Every Morning Before Breakfast      gabapentin 800 MG tablet  Commonly known as: NEURONTIN   800 mg, 3 Times Daily      sertraline 50 MG tablet  Commonly known as: ZOLOFT   50 mg, Daily      traZODone 100 MG tablet  Commonly known as: DESYREL   100 mg, Nightly               Allergies   Allergen Reactions    Fish Oil Rash         Discharge Disposition:  Home or Self Care    Discharge Diet:  Diet Order   Procedures    Diet: Cardiac; Healthy Heart (2-3 Na+); Fluid Consistency: Thin (IDDSI 0)         Discharge Activity:   Activity Instructions       Activity as Tolerated      Up WIth Assist                CODE STATUS:    Code Status and Medical Interventions: CPR (Attempt to Resuscitate); Full Support   Ordered at: 10/14/24 8028     Level Of Support Discussed With:    Patient     Code Status (Patient has no pulse and is not breathing):    CPR (Attempt to Resuscitate)     Medical Interventions (Patient has pulse or is breathing):     Full Support         No future appointments.    Additional Instructions for the Follow-ups that You Need to Schedule       Call MD With Problems / Concerns   As directed      Instructions: Call provider for temp >100.5, shortness of breath, increased cough    Order Comments: Instructions: Call provider for temp >100.5, shortness of breath, increased cough         Discharge Follow-up with PCP   As directed       Currently Documented PCP:    Maria Alejandra West APRN    PCP Phone Number:    [phone]     Follow Up Details: 10/29/24 @ 11 am                Time Spent on Discharge:  48 minutes    Electronically signed by ELZA Paul, 10/17/24, 9:36 AM EDT.

## 2024-10-17 NOTE — CASE MANAGEMENT/SOCIAL WORK
Case Management Discharge Note      Final Note: Per medical team, pt is medically ready for discharge today and the plan is home with spous and granddaughter. [facility] I spoke with Radha with [facility] and he is aware pt is being discharged today and will make arrangements to set up trilogy in the home today. Per Radha, [facility] business office is working with patient on copay/payment arrangements.  Family will provide transportation home. Pt denies further discharge needs.         Selected Continued Care - Admitted Since 10/14/2024       Destination    No services have been selected for the patient.                Durable Medical Equipment       Service Provider Services Address Phone Fax Patient Preferred    [facility], Durable Medical Equipment [address] [address] [phone] --              Dialysis/Infusion    No services have been selected for the patient.                Home Medical Care    No services have been selected for the patient.                Therapy    No services have been selected for the patient.                Community Resources    No services have been selected for the patient.                Community & DME    No services have been selected for the patient.                         Final Discharge Disposition Code: 01 - home or self-care

## 2024-10-17 NOTE — PLAN OF CARE
Goal Outcome Evaluation:  Plan of Care Reviewed With: patient        Progress: improving  Outcome Evaluation: Pt is A&O with VSS, NSR on the monitor, and on 3L NC. She slept better last night. There are no complaints at this time.

## 2024-10-18 NOTE — PAYOR COMM NOTE
"Bryn Keita (69 y.o. Female)     [id number]     Monae Shaver, FABIOLA  Utilization Review  [phone]  [phone]        Date of Birth   1955    Social Security Number       Address   [address] [place] [id number]    Home Phone   [phone]    MRN   [id number]       Quaker   None    Marital Status                               Admission Date   10/14/24    Admission Type   Emergency    Admitting Provider   Cesar Peters MD    Attending Provider       Department, Room/Bed   [facility] 6A, N603/1       Discharge Date   10/17/2024    Discharge Disposition   Home or Self Care    Discharge Destination                                 Attending Provider: (none)   Allergies: Fish Oil    Isolation: None   Infection: None   Code Status: Prior    Ht: 162.6 cm (64\")   Wt: 72.6 kg (160 lb)    Admission Cmt: None   Principal Problem: COPD with exacerbation [J44.1]                   Active Insurance as of 10/14/2024       Primary Coverage       Payor Plan Insurance Group Employer/Plan Group    [facility] MEDICARE REPLACEMENT ANTH MEDICARE ADVANTAGE [id number]       Payor Plan Address Payor Plan Phone Number Payor Plan Fax Number Effective Dates    [address] [phone]  2019 - None Entered    [facility] [address]         Subscriber Name Subscriber Birth Date Member ID       BRYN KEITA 1955 [id number]                     Emergency Contacts        (Rel.) Home Phone Work Phone Mobile Phone    AARON WYLIE (Daughter) -- -- [phone]    JESENIA KEITA (Spouse) [phone] -- --    BRYN WYLIE (Daughter) -- -- [phone]                 Discharge Summary        Stefany Huffman APRN at 10/17/24 0936              [facility] Medicine Services  DISCHARGE SUMMARY    Patient Name: Bryn Keita  : 1955  MRN: [id number]    Date of Admission: 10/14/2024  Date of Discharge:  10/17/2024  Primary Care Physician: Maria Alejandra West, " APRN    Consults       No orders found from 9/15/2024 to 10/15/2024.            Hospital Course     Presenting Problem:   COPD with acute exacerbation [J44.1]    Active Hospital Problems    Diagnosis  POA    Chronic hypoxemic respiratory failure [J96.11]  Yes    Nicotine dependence [F17.200]  Yes      Resolved Hospital Problems    Diagnosis Date Resolved POA    **COPD with exacerbation [J44.1] 10/17/2024 Yes      Hospital Course:  Elvi Keita is a 69 y.o. female PMH COPD (chronically on 4L oxygen who continues to smoke), HTN who presented with increased shortness of breath over several weeks.  Patient found with COPD exacerbation.  Patient is s/p azithromycin.  Patient received steroid therapy with scheduled neb treatments with improvement.  Patient reported to be a good candidate for trilogy machine with ABG showing significant improvement with the BiPAP that was used on arrival.  ABG revealing PaCO2 76.9, making the patient at risk for worsening chronic respiratory failure.  Bilevel/RAD was tried but failed.    Assessment/Plan  Exacerbation-resolved  Chronic hypoxic hypercapnic respiratory failure  Nicotine dependence  - D-dimer negative  - Azithromycin x 3 days  - S/p Solu-Medrol transition to prednisone  - DuoNebs scheduled  - Home inhalers  - NRT  - Tobacco cessation education  - Pulmonology outpatient consult  - Initially on 6 L weaned down to 3L  - Required BiPAP on admission, recommend to continue BiPAP nightly and when sleeping during the day.  Patient is good candidate for trilogy machine with ABG showing significant improvement with BiPAP.  Most recent ABG revealing PaCO2 76.9, patient at risk for worsening chronic respiratory failure.  Bilevel/RAD has been tried and failed.    HTN  - Home meds      Discharge Follow Up Recommendations for labs/diagnostics:  Follow up with PCP in 1 week  Follow up with [facility] pulmonology in 1 week    Day of Discharge     HPI:   Pt sitting up in the bed reporting she feels  "better than on arrival. O2 sat 94% on 3L NC. She reports occasional cough.       Otherwise ROS is negative except as mentioned in the HPI.    Vital Signs:   Temp:  [97.7 °F (36.5 °C)-98.4 °F (36.9 °C)] 98.4 °F (36.9 °C)  Heart Rate:  [56-84] 58  Resp:  [18-20] 18  BP: (128-152)/(57-65) 140/58     Physical Exam:  Constitutional: Awake, alert, NAD  HENT: NCAT, mucous membranes moist  Respiratory: dim in bases with occasional E/W, nonlabored  Cardiovascular: RRR, no murmurs, rubs, or gallops  Gastrointestinal: Positive bowel sounds, soft, nontender, nondistended  Musculoskeletal: No bilateral ankle edema  Psychiatric: Appropriate affect, cooperative  Neurologic: Oriented x 3, strength symmetric in all extremities, Cranial Nerves grossly intact to confrontation, speech clear  Skin: No rashes      Pertinent  and/or Most Recent Results     Results from last 7 days   Lab Units 10/16/24  0420 10/15/24  0350 10/14/24  1238   WBC 10*3/mm3 9.41 4.79 8.19   HEMOGLOBIN g/dL 13.6 13.9 15.0   HEMATOCRIT % 45.1 45.7 49.1*   PLATELETS 10*3/mm3 145 141 155   SODIUM mmol/L 138 141 140   POTASSIUM mmol/L 4.6 4.3 4.5   CHLORIDE mmol/L 95* 98 95*   CO2 mmol/L 36.0* 39.0* 41.0*   BUN mg/dL 17 9 7*   CREATININE mg/dL 0.68 0.65 0.73   GLUCOSE mg/dL 128* 128* 115*   CALCIUM mg/dL 8.4* 8.7 8.9     Results from last 7 days   Lab Units 10/14/24  1238   BILIRUBIN mg/dL 0.4   ALK PHOS U/L 101   ALT (SGPT) U/L 8   AST (SGOT) U/L 12           Invalid input(s): \"TG\", \"LDLCALC\", \"LDLREALC\"  Results from last 7 days   Lab Units 10/14/24  1238   PROBNP pg/mL 616.9   HSTROP T ng/L 9   LACTATE mmol/L 0.9       Brief Urine Lab Results  (Last result in the past 365 days)        Color   Clarity   Blood   Leuk Est   Nitrite   Protein   CREAT   Urine HCG        10/14/24 1518 Yellow   Clear   Negative   Negative   Negative   Negative                   Microbiology Results Abnormal       Procedure Component Value - Date/Time    Blood Culture - Blood, Arm, Left " [[id number]]  (Normal) Collected: 10/14/24 1310    Lab Status: Preliminary result Specimen: Blood from Arm, Left Updated: 10/16/24 1345     Blood Culture No growth at 2 days    Narrative:      Less than seven (7) mL's of blood was collected.  Insufficient quantity may yield false negative results.    Blood Culture - Blood, Arm, Right [[id number]]  (Normal) Collected: 10/14/24 1255    Lab Status: Preliminary result Specimen: Blood from Arm, Right Updated: 10/16/24 1345     Blood Culture No growth at 2 days    Narrative:      Less than seven (7) mL's of blood was collected.  Insufficient quantity may yield false negative results.    COVID PRE-OP / PRE-PROCEDURE SCREENING ORDER (NO ISOLATION) - Swab, Nasopharynx [[id number]]  (Normal) Collected: 10/14/24 1323    Lab Status: Final result Specimen: Swab from Nasopharynx Updated: 10/14/24 1411    Narrative:      The following orders were created for panel order COVID PRE-OP / PRE-PROCEDURE SCREENING ORDER (NO ISOLATION) - Swab, Nasopharynx.  Procedure                               Abnormality         Status                     ---------                               -----------         ------                     COVID-19, FLU A/B, RSV P...[[id number]]  Normal              Final result                 Please view results for these tests on the individual orders.    COVID-19, FLU A/B, RSV PCR 1 HR TAT - Swab, Nasopharynx [[id number]]  (Normal) Collected: 10/14/24 1323    Lab Status: Final result Specimen: Swab from Nasopharynx Updated: 10/14/24 1411     COVID19 Not Detected     Influenza A PCR Not Detected     Influenza B PCR Not Detected     RSV, PCR Not Detected    Narrative:      Fact sheet for providers: https://www.fda.gov/media/622190/download    Fact sheet for patients: https://www.fda.gov/media/844825/download    Test performed by PCR.            Imaging Results (All)       Procedure Component Value Units Date/Time    XR Chest 1 View [[id number]] Collected: 10/14/24 1323      Updated: 10/14/24 1327    Narrative:      XR CHEST 1 VW    Date of Exam: 10/14/2024 12:53 PM EDT    Indication: SOA triage protocol    Comparison: 9/2/2024    Findings:  Cardiomediastinal silhouette is unremarkable.  No airspace disease, pneumothorax, nor pleural effusion. The lungs are emphysematous with chronic interstitial changes. No acute osseous abnormality identified.      Impression:      Impression:  Stable appearance of the chest with emphysema and chronic interstitial changes.      Electronically Signed: Claudia Romero MD    10/14/2024 1:24 PM EDT    Workstation ID: [id number]                    Results for orders placed during the hospital encounter of 08/30/24    Adult Transthoracic Echo Complete W/ Cont if Necessary Per Protocol    Interpretation Summary    Left ventricular ejection fraction appears to be greater than 70%.    Left ventricular wall thickness is consistent with mild posterior asymmetric hypertrophy.    The right ventricular cavity is mild to moderately dilated.    The left atrial cavity is mildly dilated.    Left atrial volume is mildly increased.    The right atrial cavity is moderately  dilated.    Estimated right ventricular systolic pressure from tricuspid regurgitation is normal (<35 mmHg).      Pending Labs       Order Current Status    Blood Culture - Blood, Arm, Left Preliminary result    Blood Culture - Blood, Arm, Right Preliminary result          Discharge Details        Discharge Medications        New Medications        Instructions Start Date   acetaminophen 325 MG tablet  Commonly known as: TYLENOL   650 mg, Oral, Every 6 Hours PRN      nicotine 21 MG/24HR patch  Commonly known as: NICODERM CQ   1 patch, Transdermal, Every 24 Hours Scheduled   Start Date: October 18, 2024     nicotine polacrilex 4 MG gum  Commonly known as: NICORETTE   4 mg, Mouth/Throat, Every 1 Hour PRN      predniSONE 20 MG tablet  Commonly known as: DELTASONE   40 mg, Oral, Daily With Breakfast    Start Date: October 18, 2024            Changes to Medications        Instructions Start Date   hydroCHLOROthiazide 12.5 MG tablet  What changed:   how much to take  when to take this   6.25 mg, Oral, Daily   Start Date: October 18, 2024            Continue These Medications        Instructions Start Date   albuterol sulfate  (90 Base) MCG/ACT inhaler  Commonly known as: PROVENTIL HFA;VENTOLIN HFA;PROAIR HFA   2 puffs, Every 6 Hours PRN      Anoro Ellipta 62.5-25 MCG/ACT aerosol powder  inhaler  Generic drug: Umeclidinium-Vilanterol   1 puff, Daily - RT      bisoprolol-hydrochlorothiazide 10-6.25 MG per tablet  Commonly known as: ZIAC   1 tablet, Daily      esomeprazole 40 MG capsule  Commonly known as: nexIUM   40 mg, Every Morning Before Breakfast      gabapentin 800 MG tablet  Commonly known as: NEURONTIN   800 mg, 3 Times Daily      sertraline 50 MG tablet  Commonly known as: ZOLOFT   50 mg, Daily      traZODone 100 MG tablet  Commonly known as: DESYREL   100 mg, Nightly               Allergies   Allergen Reactions    Fish Oil Rash         Discharge Disposition:  Home or Self Care    Discharge Diet:  Diet Order   Procedures    Diet: Cardiac; Healthy Heart (2-3 Na+); Fluid Consistency: Thin (IDDSI 0)         Discharge Activity:   Activity Instructions       Activity as Tolerated      Up WIth Assist                CODE STATUS:    Code Status and Medical Interventions: CPR (Attempt to Resuscitate); Full Support   Ordered at: 10/14/24 9681     Level Of Support Discussed With:    Patient     Code Status (Patient has no pulse and is not breathing):    CPR (Attempt to Resuscitate)     Medical Interventions (Patient has pulse or is breathing):    Full Support         No future appointments.    Additional Instructions for the Follow-ups that You Need to Schedule       Call MD With Problems / Concerns   As directed      Instructions: Call provider for temp >100.5, shortness of breath, increased cough    Order  Comments: Instructions: Call provider for temp >100.5, shortness of breath, increased cough         Discharge Follow-up with PCP   As directed       Currently Documented PCP:    Maria Alejandra West APRN    PCP Phone Number:    [phone]     Follow Up Details: 10/29/24 @ 11 am                Time Spent on Discharge:  48 minutes    Electronically signed by ELZA Paul, 10/17/24, 9:36 AM EDT.       Electronically signed by Stefany Huffman APRN at 10/17/24 9113

## 2024-10-18 NOTE — OUTREACH NOTE
Prep Survey      Flowsheet Row Responses   [facility] facility patient discharged from? [facility]   Is LACE score < 7 ? No   Eligibility Readm Mgmt   Discharge diagnosis COPD with exacerbation   Does the patient have one of the following disease processes/diagnoses(primary or secondary)? COPD   Does the patient have Home health ordered? No   Is there a DME ordered? Yes   What DME was ordered? trilogy from [facility]   Prep survey completed? Yes            Fern WHITTINGTON - Registered Nurse

## 2024-10-19 LAB
BACTERIA SPEC AEROBE CULT: NORMAL
BACTERIA SPEC AEROBE CULT: NORMAL

## 2024-10-21 ENCOUNTER — READMISSION MANAGEMENT (OUTPATIENT)
Dept: CALL CENTER | Facility: HOSPITAL | Age: 69
End: 2024-10-21
Payer: MEDICARE

## 2024-10-21 NOTE — OUTREACH NOTE
COPD/PN Week 1 Survey      Flowsheet Row Responses   [facility] patient discharged from? [facility]   Does the patient have one of the following disease processes/diagnoses(primary or secondary)? COPD   Week 1 attempt successful? Yes   Call start time 1021   Call end time 1025   List who call center can speak with pt   Meds reviewed with patient/caregiver? Yes   Is the patient having any side effects they believe may be caused by any medication additions or changes? No   Does the patient have all medications ordered at discharge? Yes   Is the patient taking all medications as directed (includes completed medication regime)? Yes   Comments regarding appointments Pt to see pcp next month.   Does the patient have a primary care provider?  Yes   Has the patient kept scheduled appointments due by today? N/A   Pulse Ox monitoring Intermittent   Psychosocial issues? No   Did the patient receive a copy of their discharge instructions? Yes   Nursing interventions Reviewed instructions with patient   What is the patient's perception of their health status since discharge? Improving   Is the patient/caregiver able to teach back the hierarchy of who to call/visit for symptoms/problems? PCP, Specialist, Home health nurse, Urgent Care, ED, 911 Yes   Is the patient able to teach back COPD zones? Yes   Patient reports what zone on this call? Green Zone   Green Zone Reports doing well, Usual activity and exercise level   Green Zone interventions: Take daily medications, Use oxygen as prescribed   Week 1 call completed? Yes   Is the patient interested in additional calls from an ambulatory ? No   Would this patient benefit from a Referral to Amb Social Work? No   Wrap up additional comments Pt reports improving. Pt has all medications. Pt using continuous 02 @ 3L.   Call end time 1025            Sumi THOMPSON - Registered Nurse

## 2024-10-29 ENCOUNTER — READMISSION MANAGEMENT (OUTPATIENT)
Dept: CALL CENTER | Facility: HOSPITAL | Age: 69
End: 2024-10-29
Payer: MEDICARE

## 2024-10-29 NOTE — OUTREACH NOTE
COPD/PN Week 2 Survey      Flowsheet Row Responses   [facility] facility patient discharged from? [facility]   Does the patient have one of the following disease processes/diagnoses(primary or secondary)? COPD   Week 2 attempt successful? No   Unsuccessful attempts Attempt 1            Cindy Ritter Licensed Nurse

## 2025-05-21 ENCOUNTER — HOSPITAL ENCOUNTER (EMERGENCY)
Facility: HOSPITAL | Age: 70
Discharge: HOME | End: 2025-05-21
Attending: EMERGENCY MEDICINE | Admitting: EMERGENCY MEDICINE
Payer: MEDICARE

## 2025-05-21 ENCOUNTER — APPOINTMENT (OUTPATIENT)
Dept: GENERAL RADIOLOGY | Facility: HOSPITAL | Age: 70
End: 2025-05-21
Payer: MEDICARE

## 2025-05-21 ENCOUNTER — APPOINTMENT (OUTPATIENT)
Dept: CT IMAGING | Facility: HOSPITAL | Age: 70
End: 2025-05-21
Payer: MEDICARE

## 2025-05-21 VITALS
BODY MASS INDEX: 29.02 KG/M2 | DIASTOLIC BLOOD PRESSURE: 86 MMHG | RESPIRATION RATE: 16 BRPM | TEMPERATURE: 98 F | HEIGHT: 64 IN | HEART RATE: 67 BPM | WEIGHT: 170 LBS | OXYGEN SATURATION: 94 % | SYSTOLIC BLOOD PRESSURE: 158 MMHG

## 2025-05-21 DIAGNOSIS — Z86.79 HISTORY OF HYPERTENSION: ICD-10-CM

## 2025-05-21 DIAGNOSIS — F17.200 SMOKER: ICD-10-CM

## 2025-05-21 DIAGNOSIS — R06.02 SHORTNESS OF BREATH: ICD-10-CM

## 2025-05-21 DIAGNOSIS — R60.0 BILATERAL LEG EDEMA: ICD-10-CM

## 2025-05-21 DIAGNOSIS — J44.1 ACUTE EXACERBATION OF CHRONIC OBSTRUCTIVE PULMONARY DISEASE (COPD): Primary | ICD-10-CM

## 2025-05-21 LAB
ALBUMIN SERPL-MCNC: 3.5 G/DL (ref 3.5–5.2)
ALBUMIN/GLOB SERPL: 1.3 G/DL
ALP SERPL-CCNC: 115 U/L (ref 39–117)
ALT SERPL W P-5'-P-CCNC: 11 U/L (ref 1–33)
ANION GAP SERPL CALCULATED.3IONS-SCNC: 9 MMOL/L (ref 5–15)
AST SERPL-CCNC: 19 U/L (ref 1–32)
BASOPHILS # BLD AUTO: 0.04 10*3/MM3 (ref 0–0.2)
BASOPHILS NFR BLD AUTO: 0.6 % (ref 0–1.5)
BILIRUB SERPL-MCNC: 0.7 MG/DL (ref 0–1.2)
BUN SERPL-MCNC: 4 MG/DL (ref 8–23)
BUN/CREAT SERPL: 4.7 (ref 7–25)
CALCIUM SPEC-SCNC: 9.5 MG/DL (ref 8.6–10.5)
CHLORIDE SERPL-SCNC: 96 MMOL/L (ref 98–107)
CO2 SERPL-SCNC: 32 MMOL/L (ref 22–29)
CREAT SERPL-MCNC: 0.85 MG/DL (ref 0.57–1)
D DIMER PPP FEU-MCNC: 1.42 MCGFEU/ML (ref 0–0.7)
DEPRECATED RDW RBC AUTO: 52 FL (ref 37–54)
EGFRCR SERPLBLD CKD-EPI 2021: 73.8 ML/MIN/1.73
EOSINOPHIL # BLD AUTO: 0.12 10*3/MM3 (ref 0–0.4)
EOSINOPHIL NFR BLD AUTO: 1.9 % (ref 0.3–6.2)
ERYTHROCYTE [DISTWIDTH] IN BLOOD BY AUTOMATED COUNT: 13.6 % (ref 12.3–15.4)
FLUAV RNA RESP QL NAA+PROBE: NOT DETECTED
FLUBV RNA RESP QL NAA+PROBE: NOT DETECTED
GEN 5 1HR TROPONIN T REFLEX: 12 NG/L
GLOBULIN UR ELPH-MCNC: 2.7 GM/DL
GLUCOSE SERPL-MCNC: 102 MG/DL (ref 65–99)
HCT VFR BLD AUTO: 53.1 % (ref 34–46.6)
HGB BLD-MCNC: 16.5 G/DL (ref 12–15.9)
HOLD SPECIMEN: NORMAL
IMM GRANULOCYTES # BLD AUTO: 0.01 10*3/MM3 (ref 0–0.05)
IMM GRANULOCYTES NFR BLD AUTO: 0.2 % (ref 0–0.5)
LYMPHOCYTES # BLD AUTO: 1.19 10*3/MM3 (ref 0.7–3.1)
LYMPHOCYTES NFR BLD AUTO: 18.8 % (ref 19.6–45.3)
MCH RBC QN AUTO: 31.4 PG (ref 26.6–33)
MCHC RBC AUTO-ENTMCNC: 31.1 G/DL (ref 31.5–35.7)
MCV RBC AUTO: 101 FL (ref 79–97)
MONOCYTES # BLD AUTO: 0.49 10*3/MM3 (ref 0.1–0.9)
MONOCYTES NFR BLD AUTO: 7.7 % (ref 5–12)
NEUTROPHILS NFR BLD AUTO: 4.48 10*3/MM3 (ref 1.7–7)
NEUTROPHILS NFR BLD AUTO: 70.8 % (ref 42.7–76)
NRBC BLD AUTO-RTO: 0 /100 WBC (ref 0–0.2)
NT-PROBNP SERPL-MCNC: 2317 PG/ML (ref 0–900)
PLATELET # BLD AUTO: 158 10*3/MM3 (ref 140–450)
PMV BLD AUTO: 10.2 FL (ref 6–12)
POTASSIUM SERPL-SCNC: 4.1 MMOL/L (ref 3.5–5.2)
PROT SERPL-MCNC: 6.2 G/DL (ref 6–8.5)
RBC # BLD AUTO: 5.26 10*6/MM3 (ref 3.77–5.28)
SARS-COV-2 RNA RESP QL NAA+PROBE: NOT DETECTED
SODIUM SERPL-SCNC: 137 MMOL/L (ref 136–145)
TROPONIN T % DELTA: -14
TROPONIN T NUMERIC DELTA: -2 NG/L
TROPONIN T SERPL HS-MCNC: 14 NG/L
WBC NRBC COR # BLD AUTO: 6.33 10*3/MM3 (ref 3.4–10.8)
WHOLE BLOOD HOLD COAG: NORMAL
WHOLE BLOOD HOLD SPECIMEN: NORMAL

## 2025-05-21 PROCEDURE — 85025 COMPLETE CBC W/AUTO DIFF WBC: CPT | Performed by: EMERGENCY MEDICINE

## 2025-05-21 PROCEDURE — 85379 FIBRIN DEGRADATION QUANT: CPT | Performed by: EMERGENCY MEDICINE

## 2025-05-21 PROCEDURE — 80053 COMPREHEN METABOLIC PANEL: CPT | Performed by: EMERGENCY MEDICINE

## 2025-05-21 PROCEDURE — 63710000001 PREDNISONE PER 1 MG: Performed by: EMERGENCY MEDICINE

## 2025-05-21 PROCEDURE — 36415 COLL VENOUS BLD VENIPUNCTURE: CPT

## 2025-05-21 PROCEDURE — 93005 ELECTROCARDIOGRAM TRACING: CPT | Performed by: EMERGENCY MEDICINE

## 2025-05-21 PROCEDURE — 87636 SARSCOV2 & INF A&B AMP PRB: CPT | Performed by: EMERGENCY MEDICINE

## 2025-05-21 PROCEDURE — 71045 X-RAY EXAM CHEST 1 VIEW: CPT

## 2025-05-21 PROCEDURE — 83880 ASSAY OF NATRIURETIC PEPTIDE: CPT | Performed by: EMERGENCY MEDICINE

## 2025-05-21 PROCEDURE — 71275 CT ANGIOGRAPHY CHEST: CPT

## 2025-05-21 PROCEDURE — 99285 EMERGENCY DEPT VISIT HI MDM: CPT

## 2025-05-21 PROCEDURE — 94640 AIRWAY INHALATION TREATMENT: CPT

## 2025-05-21 PROCEDURE — 84484 ASSAY OF TROPONIN QUANT: CPT | Performed by: EMERGENCY MEDICINE

## 2025-05-21 PROCEDURE — 25510000001 IOPAMIDOL PER 1 ML: Performed by: EMERGENCY MEDICINE

## 2025-05-21 PROCEDURE — 94799 UNLISTED PULMONARY SVC/PX: CPT

## 2025-05-21 RX ORDER — DOXYCYCLINE 100 MG/1
100 CAPSULE ORAL 2 TIMES DAILY
Qty: 10 CAPSULE | Refills: 0 | Status: SHIPPED | OUTPATIENT
Start: 2025-05-21 | End: 2025-05-26

## 2025-05-21 RX ORDER — ALBUTEROL SULFATE 90 UG/1
2 INHALANT RESPIRATORY (INHALATION) ONCE
Status: DISCONTINUED | OUTPATIENT
Start: 2025-05-21 | End: 2025-05-21 | Stop reason: HOSPADM

## 2025-05-21 RX ORDER — NICOTINE 21 MG/24HR
1 PATCH, TRANSDERMAL 24 HOURS TRANSDERMAL EVERY 24 HOURS
Qty: 30 PATCH | Refills: 1 | Status: SHIPPED | OUTPATIENT
Start: 2025-05-21

## 2025-05-21 RX ORDER — FUROSEMIDE 20 MG/1
20 TABLET ORAL DAILY
Qty: 5 TABLET | Refills: 0 | Status: SHIPPED | OUTPATIENT
Start: 2025-05-21

## 2025-05-21 RX ORDER — SODIUM CHLORIDE 0.9 % (FLUSH) 0.9 %
10 SYRINGE (ML) INJECTION AS NEEDED
Status: DISCONTINUED | OUTPATIENT
Start: 2025-05-21 | End: 2025-05-21 | Stop reason: HOSPADM

## 2025-05-21 RX ORDER — PREDNISONE 50 MG/1
50 TABLET ORAL DAILY
Qty: 4 TABLET | Refills: 0 | Status: SHIPPED | OUTPATIENT
Start: 2025-05-21

## 2025-05-21 RX ORDER — PREDNISONE 20 MG/1
60 TABLET ORAL ONCE
Status: COMPLETED | OUTPATIENT
Start: 2025-05-21 | End: 2025-05-21

## 2025-05-21 RX ORDER — DOXYCYCLINE 100 MG/1
100 CAPSULE ORAL ONCE
Status: COMPLETED | OUTPATIENT
Start: 2025-05-21 | End: 2025-05-21

## 2025-05-21 RX ORDER — IOPAMIDOL 755 MG/ML
100 INJECTION, SOLUTION INTRAVASCULAR
Status: COMPLETED | OUTPATIENT
Start: 2025-05-21 | End: 2025-05-21

## 2025-05-21 RX ORDER — IPRATROPIUM BROMIDE AND ALBUTEROL SULFATE 2.5; .5 MG/3ML; MG/3ML
3 SOLUTION RESPIRATORY (INHALATION) ONCE
Status: COMPLETED | OUTPATIENT
Start: 2025-05-21 | End: 2025-05-21

## 2025-05-21 RX ADMIN — IOPAMIDOL 80 ML: 755 INJECTION, SOLUTION INTRAVENOUS at 13:37

## 2025-05-21 RX ADMIN — DOXYCYCLINE 100 MG: 100 CAPSULE ORAL at 13:11

## 2025-05-21 RX ADMIN — IPRATROPIUM BROMIDE AND ALBUTEROL SULFATE 3 ML: 2.5; .5 SOLUTION RESPIRATORY (INHALATION) at 12:12

## 2025-05-21 RX ADMIN — PREDNISONE 60 MG: 20 TABLET ORAL at 13:11

## 2025-05-21 NOTE — ED PROVIDER NOTES
[facility]    EMERGENCY DEPARTMENT ENCOUNTER      Pt Name: Elvi Keita  MRN: [id number]  YOB: 1955  Date of evaluation: 5/21/2025  Provider: Deion Ruiz MD    CHIEF COMPLAINT       Chief Complaint   Patient presents with    Shortness of Breath         HISTORY OF PRESENT ILLNESS   Elvi Keita is a 70 y.o. female who presents to the emergency department with complaint of increased shortness of breath, wheezing, dry cough for the past several weeks. Patient is a long time smoker, has known COPD, and continues to smoke. She has also had new swelling in her BLE, progressive over a few weeks. Denies any hx CHF, liver disease, renal disease.        Nursing notes were reviewed.    REVIEW OF SYSTEMS     ROS:  A chief complaint appropriate review of systems was completed and is negative except as noted in the HPI.      PAST MEDICAL HISTORY     Past Medical History:   Diagnosis Date    COPD (chronic obstructive pulmonary disease)     Hypertension          SURGICAL HISTORY       Past Surgical History:   Procedure Laterality Date    LAPAROSCOPIC TUBAL LIGATION      TOE SURGERY           CURRENT MEDICATIONS     No current facility-administered medications for this encounter.    Current Outpatient Medications:     acetaminophen (TYLENOL) 325 MG tablet, Take 2 tablets by mouth Every 6 (Six) Hours As Needed for Mild Pain., Disp: , Rfl:     albuterol sulfate  (90 Base) MCG/ACT inhaler, Inhale 2 puffs Every 6 (Six) Hours As Needed for Wheezing or Shortness of Air., Disp: , Rfl:     bisoprolol-hydrochlorothiazide (ZIAC) 10-6.25 MG per tablet, Take 1 tablet by mouth Daily., Disp: , Rfl:     doxycycline (MONODOX) 100 MG capsule, Take 1 capsule by mouth 2 (Two) Times a Day for 5 days., Disp: 10 capsule, Rfl: 0    esomeprazole (nexIUM) 40 MG capsule, Take 1 capsule by mouth Every Morning Before Breakfast., Disp: , Rfl:     furosemide (LASIX) 20 MG tablet, Take 1 tablet by mouth Daily., Disp: 5 tablet, Rfl: 0     gabapentin (NEURONTIN) 800 MG tablet, Take 1 tablet by mouth 3 (Three) Times a Day., Disp: , Rfl:     hydroCHLOROthiazide 12.5 MG tablet, Take 0.5 tablets by mouth Daily., Disp: 15 tablet, Rfl: 0    nicotine (NICODERM CQ) 21 MG/24HR patch, Place 1 patch on the skin as directed by provider Daily., Disp: 30 patch, Rfl: 0    nicotine (NICODERM CQ) 21 MG/24HR patch, Place 1 patch on the skin as directed by provider Daily., Disp: 30 patch, Rfl: 1    nicotine polacrilex (NICORETTE) 4 MG gum, Chew 1 each Every 1 (One) Hour As Needed for Smoking Cessation., Disp: 60 each, Rfl: 0    predniSONE (DELTASONE) 50 MG tablet, Take 1 tablet by mouth Daily., Disp: 4 tablet, Rfl: 0    sertraline (ZOLOFT) 50 MG tablet, Take 1 tablet by mouth Daily., Disp: , Rfl:     traZODone (DESYREL) 100 MG tablet, Take 1 tablet by mouth Every Night., Disp: , Rfl:     Umeclidinium-Vilanterol (Anoro Ellipta) 62.5-25 MCG/ACT aerosol powder  inhaler, Inhale 1 puff Daily., Disp: , Rfl:     ALLERGIES     Fish oil    FAMILY HISTORY       Family History   Problem Relation Age of Onset    COPD Mother     No Known Problems Father           SOCIAL HISTORY       Social History     Socioeconomic History    Marital status:    Tobacco Use    Smoking status: Every Day     Current packs/day: 1.00     Average packs/day: 1 pack/day for 54.4 years (54.4 ttl pk-yrs)     Types: Cigarettes     Start date: 1971   Vaping Use    Vaping status: Never Used   Substance and Sexual Activity    Drug use: Never    Sexual activity: Not Currently         PHYSICAL EXAM    (up to 7 for level 4, 8 or more for level 5)     Vitals:    05/21/25 1330 05/21/25 1400 05/21/25 1430 05/21/25 1500   BP: 162/59 159/64 125/49 158/86   BP Location:       Patient Position:       Pulse: 61 70 61 67   Resp:       Temp:       TempSrc:       SpO2: 93% 90% 92% 94%   Weight:       Height:           General: Awake, alert, no acute distress.  HEENT: Conjunctivae normal.  Neck: Trachea  midline.  Cardiac: Heart regular rate, rhythm, no murmurs, rubs, or gallops  Lungs: No tachypnea, mild diffuse expiratory wheezes  Abdomen: Abdomen is soft, nontender, nondistended. There are no firm or pulsatile masses, no rebound rigidity or guarding.   Musculoskeletal: No deformity.  Neuro: Alert   Dermatology: Skin is warm and dry  Psych: Mentation is grossly normal, cognition is grossly normal. Affect is appropriate.        DIAGNOSTIC RESULTS     EKG: All EKGs are interpreted by the Emergency Department Physician who either signs or Co-signs this chart in the absence of a cardiologist.    ECG 12 Lead Dyspnea   Preliminary Result   Test Reason : Dyspnea   Blood Pressure :   */*   mmHG   Vent. Rate :  58 BPM     Atrial Rate :  58 BPM      P-R Int : 222 ms          QRS Dur :  92 ms       QT Int : 462 ms       P-R-T Axes :  64  21  47 degrees     QTcB Int : 453 ms      Sinus bradycardia with 1st degree AV block   RSR' or QR pattern in V1 suggests right ventricular conduction delay   Borderline ECG   When compared with ECG of 21-May-2025 11:49, (Unconfirmed)   No significant change was found      Referred By: ed           Confirmed By:       ECG 12 Lead Dyspnea   Preliminary Result   Test Reason : Dyspnea   Blood Pressure :   */*   mmHG   Vent. Rate :  57 BPM     Atrial Rate :  57 BPM      P-R Int : 198 ms          QRS Dur :  92 ms       QT Int : 456 ms       P-R-T Axes :  75  35  51 degrees     QTcB Int : 443 ms      Sinus bradycardia   RSR' or QR pattern in V1 suggests right ventricular conduction delay   Borderline ECG   When compared with ECG of 14-Oct-2024 12:30,   RSR' pattern in V1 is now present      Referred By: ED MD           Confirmed By:             RADIOLOGY:   [x] Radiologist's Report Reviewed:  CT Angiogram Chest Pulmonary Embolism   Final Result   Impression:      1. No evidence of pulmonary embolic disease. No evidence of pneumonia or other clearly acute chest pathology.      2. Small area of new  discoid atelectasis or linear scarring in the medial right middle lobe of doubtful significance. Stable, similar scarring in the lingula.            Electronically Signed: Ady Connell MD     5/21/2025 2:26 PM EDT     Workstation ID: [id number]      XR Chest 1 View   Final Result   Impression:   No acute cardiopulmonary abnormality is identified.            Electronically Signed: Marjorie Romo MD     5/21/2025 12:23 PM EDT     Workstation ID: [id number]          I ordered and independently reviewed the above noted radiographic studies.        LABS:    I have reviewed and interpreted all of the currently available lab results from this visit (if applicable):  Results for orders placed or performed during the hospital encounter of 05/21/25   Comprehensive Metabolic Panel    Collection Time: 05/21/25 11:39 AM    Specimen: Blood   Result Value Ref Range    Glucose 102 (H) 65 - 99 mg/dL    BUN 4 (L) 8 - 23 mg/dL    Creatinine 0.85 0.57 - 1.00 mg/dL    Sodium 137 136 - 145 mmol/L    Potassium 4.1 3.5 - 5.2 mmol/L    Chloride 96 (L) 98 - 107 mmol/L    CO2 32.0 (H) 22.0 - 29.0 mmol/L    Calcium 9.5 8.6 - 10.5 mg/dL    Total Protein 6.2 6.0 - 8.5 g/dL    Albumin 3.5 3.5 - 5.2 g/dL    ALT (SGPT) 11 1 - 33 U/L    AST (SGOT) 19 1 - 32 U/L    Alkaline Phosphatase 115 39 - 117 U/L    Total Bilirubin 0.7 0.0 - 1.2 mg/dL    Globulin 2.7 gm/dL    A/G Ratio 1.3 g/dL    BUN/Creatinine Ratio 4.7 (L) 7.0 - 25.0    Anion Gap 9.0 5.0 - 15.0 mmol/L    eGFR 73.8 >60.0 mL/min/1.73   BNP    Collection Time: 05/21/25 11:39 AM    Specimen: Blood   Result Value Ref Range    proBNP 2,317.0 (H) 0.0 - 900.0 pg/mL   High Sensitivity Troponin T    Collection Time: 05/21/25 11:39 AM    Specimen: Blood   Result Value Ref Range    HS Troponin T 14 (H) <14 ng/L   CBC Auto Differential    Collection Time: 05/21/25 11:39 AM    Specimen: Blood   Result Value Ref Range    WBC 6.33 3.40 - 10.80 10*3/mm3    RBC 5.26 3.77 - 5.28 10*6/mm3    Hemoglobin 16.5 (H) 12.0  - 15.9 g/dL    Hematocrit 53.1 (H) 34.0 - 46.6 %    .0 (H) 79.0 - 97.0 fL    MCH 31.4 26.6 - 33.0 pg    MCHC 31.1 (L) 31.5 - 35.7 g/dL    RDW 13.6 12.3 - 15.4 %    RDW-SD 52.0 37.0 - 54.0 fl    MPV 10.2 6.0 - 12.0 fL    Platelets 158 140 - 450 10*3/mm3    Neutrophil % 70.8 42.7 - 76.0 %    Lymphocyte % 18.8 (L) 19.6 - 45.3 %    Monocyte % 7.7 5.0 - 12.0 %    Eosinophil % 1.9 0.3 - 6.2 %    Basophil % 0.6 0.0 - 1.5 %    Immature Grans % 0.2 0.0 - 0.5 %    Neutrophils, Absolute 4.48 1.70 - 7.00 10*3/mm3    Lymphocytes, Absolute 1.19 0.70 - 3.10 10*3/mm3    Monocytes, Absolute 0.49 0.10 - 0.90 10*3/mm3    Eosinophils, Absolute 0.12 0.00 - 0.40 10*3/mm3    Basophils, Absolute 0.04 0.00 - 0.20 10*3/mm3    Immature Grans, Absolute 0.01 0.00 - 0.05 10*3/mm3    nRBC 0.0 0.0 - 0.2 /100 WBC   D-dimer, Quantitative    Collection Time: 05/21/25 11:39 AM    Specimen: Blood   Result Value Ref Range    D-Dimer, Quantitative 1.42 (H) 0.00 - 0.70 MCGFEU/mL   Green Top (Gel)    Collection Time: 05/21/25 11:39 AM   Result Value Ref Range    Extra Tube Hold for add-ons.    Lavender Top    Collection Time: 05/21/25 11:39 AM   Result Value Ref Range    Extra Tube hold for add-on    Gold Top - SST    Collection Time: 05/21/25 11:39 AM   Result Value Ref Range    Extra Tube Hold for add-ons.    Gray Top    Collection Time: 05/21/25 11:39 AM   Result Value Ref Range    Extra Tube Hold for add-ons.    Light Blue Top    Collection Time: 05/21/25 11:39 AM   Result Value Ref Range    Extra Tube Hold for add-ons.    COVID-19 and FLU A/B PCR, 1 HR TAT - Swab, Nasopharynx    Collection Time: 05/21/25 11:47 AM    Specimen: Nasopharynx; Swab   Result Value Ref Range    COVID19 Not Detected Not Detected - Ref. Range    Influenza A PCR Not Detected Not Detected    Influenza B PCR Not Detected Not Detected   ECG 12 Lead Dyspnea    Collection Time: 05/21/25 11:49 AM   Result Value Ref Range    QT Interval 456 ms    QTC Interval 443 ms   ECG 12  Lead Dyspnea    Collection Time: 05/21/25  1:06 PM   Result Value Ref Range    QT Interval 462 ms    QTC Interval 453 ms   High Sensitivity Troponin T 1Hr    Collection Time: 05/21/25  1:10 PM    Specimen: Blood   Result Value Ref Range    HS Troponin T 12 <14 ng/L    Troponin T Numeric Delta -2 ng/L    Troponin T % Delta -14 Abnormal if >/= 20%        If labs were ordered, I independently reviewed the results and considered them in treating the patient.      EMERGENCY DEPARTMENT COURSE and DIFFERENTIAL DIAGNOSIS/MDM:   Vitals:  AS OF 09:55 EDT    BP - 158/86  HR - 67  TEMP - 98 °F (36.7 °C) (Oral)  O2 SATS - 94%        Discussion below represents my analysis of pertinent findings related to patient's condition, differential diagnosis, treatment plan and final disposition.      Differential diagnosis:  The differential diagnosis associated with the patient's presentation includes: copd, chf, renal failure, hepatic failure, pna, acs, dysrhythmia      Independent interpretations (ECG/rhythm strip/X-ray/US/CT scan): I independently intepreted the pt CXR and cardiac monitor - no PNA and the patient is in NSR      Additional sources:  Discussed/obtained information from independent historians:   [] Spouse:   [] Parent:   [] Friend:   [x] EMS: Report was taken from EMS, VSS during transport   [] Other:  External (non-ED) record review:   [] Inpatient record:   [] Office record:   [] Outpatient record:   [] Prior Outpatient labs:   [] Prior Outpatient radiology:   [] Primary Care record:   [] Outside ED record:   [] Other:       Patient's care impacted by:   [] Diabetes   [] Hypertension   [] Coronary Artery Disease   [] Cancer   [x] Other: COPD, smoker    Care significantly affected by Social Determinants of Health (housing and economic circumstances, unemployment)    [] Yes     [x] No   If yes, Patient's care significantly limited by  Social Determinants of Health including:    [] Inadequate housing    [] Low  income    [] Alcoholism and drug addiction in family    [] Problems related to primary support group    [] Unemployment    [] Problems related to employment    [] Other Social Determinants of Health:       Consideration of admission/observation vs discharge: Considered admission, however for the reasons outlined below, patient felt stable for outpatient management      I considered prescription management with:    [] Pain medication:   [] Antiviral:   [] Antibiotic:   [x] Other: Patient started on prednisone, doxycycline        ED Course:    ED Course as of 05/24/25 0955   Wed May 21, 2025   1501 On reexamination, the patient's breathing is significantly improved.  She is resting comfortably in bed and has no tachypnea.  She is currently saturating well on her baseline 3 L of oxygen.  In regards to her dyspnea, suspect COPD exacerbation as the primary cause.  Had diffuse wheezing on initial presentation, improved after nebs along with symptoms.  No chest pain, diaphoresis, nausea, vomiting to suggest myocardial ischemia.  ECG and troponin testing reassuring.  D-dimer was elevated, however follow-up CTA chest shows no pulmonary embolism or pneumonia.  Patient does have some swelling in her bilateral lower extremities, suspect some pulmonary hypertension/right heart dysfunction as result of her COPD.  Her lungs are clear without any evidence of pulmonary edema.  I have instructed her to follow-up with her PCP for referral for echocardiogram.  Will prescribe a short course of Lasix.  I had a long discussion with the patient about the importance of smoking cessation, she is requesting prescription for nicotine patches which I will prescribe. [NS]      ED Course User Index  [NS] Deion Ruiz MD           I had a discussion with the patient/family regarding diagnosis, diagnostic results, treatment plan, and medications.  The patient/family indicated understanding of these instructions.  I spent adequate time at the  bedside preceding discharge necessary to personally discuss the aftercare instructions, giving patient education, providing explanations of the results of our evaluations/findings, and my decision making to assure that the patient/family understand the plan of care.  Time was allotted to answer questions at that time and throughout the ED course.  Emphasis was placed on timely follow-up after discharge.  I also discussed the potential for the development of an acute emergent condition requiring further evaluation, admission, or even surgical intervention. I discussed that we found nothing during the visit today indicating the need for further workup, admission, or the presence of an unstable medical condition.  I encouraged the patient to return to the emergency department immediately for ANY concerns, worsening, new complaints, or if symptoms persist and unable to seek follow-up in a timely fashion.  The patient/family expressed understanding and agreement with this plan.  The patient will follow-up with their PCP in 1-2 days for reevaluation.           PROCEDURES:  Procedures    CRITICAL CARE TIME        FINAL IMPRESSION      1. Acute exacerbation of chronic obstructive pulmonary disease (COPD)    2. Bilateral leg edema    3. Shortness of breath    4. Smoker    5. History of hypertension          DISPOSITION/PLAN     ED Disposition       ED Disposition   Discharge    Condition   Stable    Comment   --                 Comment: Please note this report has been produced using speech recognition software.      Deion Ruiz MD  Attending Emergency Physician             Deion Ruiz MD  05/24/25 6750

## 2025-05-26 LAB
QT INTERVAL: 456 MS
QT INTERVAL: 462 MS
QTC INTERVAL: 443 MS
QTC INTERVAL: 453 MS

## 2025-06-07 ENCOUNTER — HOSPITAL ENCOUNTER (INPATIENT)
Facility: HOSPITAL | Age: 70
LOS: 4 days | Discharge: HOME | End: 2025-06-11
Attending: EMERGENCY MEDICINE | Admitting: STUDENT IN AN ORGANIZED HEALTH CARE EDUCATION/TRAINING PROGRAM
Payer: MEDICARE

## 2025-06-07 ENCOUNTER — APPOINTMENT (OUTPATIENT)
Dept: GENERAL RADIOLOGY | Facility: HOSPITAL | Age: 70
End: 2025-06-07
Payer: MEDICARE

## 2025-06-07 DIAGNOSIS — F17.200 SMOKER: ICD-10-CM

## 2025-06-07 DIAGNOSIS — J96.21 ACUTE ON CHRONIC RESPIRATORY FAILURE WITH HYPOXIA AND HYPERCAPNIA: ICD-10-CM

## 2025-06-07 DIAGNOSIS — J96.22 ACUTE ON CHRONIC RESPIRATORY FAILURE WITH HYPOXIA AND HYPERCAPNIA: ICD-10-CM

## 2025-06-07 DIAGNOSIS — Z86.79 HISTORY OF HYPERTENSION: ICD-10-CM

## 2025-06-07 DIAGNOSIS — J44.1 ACUTE EXACERBATION OF CHRONIC OBSTRUCTIVE PULMONARY DISEASE (COPD): Primary | ICD-10-CM

## 2025-06-07 PROBLEM — G62.9 PERIPHERAL POLYNEUROPATHY: Status: ACTIVE | Noted: 2025-06-07

## 2025-06-07 PROBLEM — F39 MOOD DISORDER: Status: ACTIVE | Noted: 2025-06-07

## 2025-06-07 PROBLEM — I87.8 LOWER EXTREMITY VENOUS STASIS: Status: ACTIVE | Noted: 2025-06-07

## 2025-06-07 LAB
ALBUMIN SERPL-MCNC: 3.9 G/DL (ref 3.5–5.2)
ALBUMIN/GLOB SERPL: 1.4 G/DL
ALP SERPL-CCNC: 100 U/L (ref 39–117)
ALT SERPL W P-5'-P-CCNC: 12 U/L (ref 1–33)
ANION GAP SERPL CALCULATED.3IONS-SCNC: 10 MMOL/L (ref 5–15)
ARTERIAL PATENCY WRIST A: POSITIVE
AST SERPL-CCNC: 14 U/L (ref 1–32)
ATMOSPHERIC PRESS: ABNORMAL MM[HG]
BASE EXCESS BLDA CALC-SCNC: 7.9 MMOL/L (ref 0–2)
BASOPHILS # BLD AUTO: 0.03 10*3/MM3 (ref 0–0.2)
BASOPHILS NFR BLD AUTO: 0.4 % (ref 0–1.5)
BDY SITE: ABNORMAL
BILIRUB SERPL-MCNC: 0.4 MG/DL (ref 0–1.2)
BODY TEMPERATURE: 37
BUN SERPL-MCNC: 9 MG/DL (ref 8–23)
BUN/CREAT SERPL: 11 (ref 7–25)
CALCIUM SPEC-SCNC: 9.4 MG/DL (ref 8.6–10.5)
CHLORIDE SERPL-SCNC: 94 MMOL/L (ref 98–107)
CO2 BLDA-SCNC: 37.7 MMOL/L (ref 22–33)
CO2 SERPL-SCNC: 33 MMOL/L (ref 22–29)
COHGB MFR BLD: 5.5 % (ref 0–2)
CREAT SERPL-MCNC: 0.82 MG/DL (ref 0.57–1)
D DIMER PPP FEU-MCNC: 0.6 MCGFEU/ML (ref 0–0.7)
DEPRECATED RDW RBC AUTO: 46.9 FL (ref 37–54)
EGFRCR SERPLBLD CKD-EPI 2021: 77.1 ML/MIN/1.73
EOSINOPHIL # BLD AUTO: 0.07 10*3/MM3 (ref 0–0.4)
EOSINOPHIL NFR BLD AUTO: 0.8 % (ref 0.3–6.2)
EPAP: 6
ERYTHROCYTE [DISTWIDTH] IN BLOOD BY AUTOMATED COUNT: 12.6 % (ref 12.3–15.4)
FLUAV SUBTYP SPEC NAA+PROBE: NOT DETECTED
FLUBV RNA ISLT QL NAA+PROBE: NOT DETECTED
GEN 5 1HR TROPONIN T REFLEX: 31 NG/L
GLOBULIN UR ELPH-MCNC: 2.7 GM/DL
GLUCOSE SERPL-MCNC: 133 MG/DL (ref 65–99)
HCO3 BLDA-SCNC: 35.9 MMOL/L (ref 20–26)
HCT VFR BLD AUTO: 54.4 % (ref 34–46.6)
HCT VFR BLD CALC: 54 % (ref 38–51)
HGB BLD-MCNC: 17.4 G/DL (ref 12–15.9)
HGB BLDA-MCNC: 17.6 G/DL (ref 14–18)
IMM GRANULOCYTES # BLD AUTO: 0.03 10*3/MM3 (ref 0–0.05)
IMM GRANULOCYTES NFR BLD AUTO: 0.4 % (ref 0–0.5)
INHALED O2 CONCENTRATION: 50 %
IPAP: 12
LDH SERPL-CCNC: 213 U/L (ref 135–214)
LYMPHOCYTES # BLD AUTO: 1.34 10*3/MM3 (ref 0.7–3.1)
LYMPHOCYTES NFR BLD AUTO: 15.9 % (ref 19.6–45.3)
MCH RBC QN AUTO: 32 PG (ref 26.6–33)
MCHC RBC AUTO-ENTMCNC: 32 G/DL (ref 31.5–35.7)
MCV RBC AUTO: 100 FL (ref 79–97)
METHGB BLD QL: 0 % (ref 0–1.5)
MODALITY: ABNORMAL
MONOCYTES # BLD AUTO: 0.53 10*3/MM3 (ref 0.1–0.9)
MONOCYTES NFR BLD AUTO: 6.3 % (ref 5–12)
NEUTROPHILS NFR BLD AUTO: 6.44 10*3/MM3 (ref 1.7–7)
NEUTROPHILS NFR BLD AUTO: 76.2 % (ref 42.7–76)
NRBC BLD AUTO-RTO: 0 /100 WBC (ref 0–0.2)
NT-PROBNP SERPL-MCNC: 1097 PG/ML (ref 0–900)
OXYHGB MFR BLDV: 93.8 % (ref 94–99)
PAW @ PEAK INSP FLOW SETTING VENT: 0 CMH2O
PCO2 BLDA: 60.4 MM HG (ref 35–45)
PCO2 TEMP ADJ BLD: 60.4 MM HG (ref 35–45)
PH BLDA: 7.38 PH UNITS (ref 7.35–7.45)
PH, TEMP CORRECTED: 7.38 PH UNITS
PLATELET # BLD AUTO: 153 10*3/MM3 (ref 140–450)
PMV BLD AUTO: 10.1 FL (ref 6–12)
PO2 BLDA: 126 MM HG (ref 83–108)
PO2 TEMP ADJ BLD: 126 MM HG (ref 83–108)
POTASSIUM SERPL-SCNC: 4.6 MMOL/L (ref 3.5–5.2)
PROCALCITONIN SERPL-MCNC: 0.07 NG/ML (ref 0–0.25)
PROT SERPL-MCNC: 6.6 G/DL (ref 6–8.5)
QT INTERVAL: 406 MS
QTC INTERVAL: 444 MS
RBC # BLD AUTO: 5.44 10*6/MM3 (ref 3.77–5.28)
SARS-COV-2 RNA RESP QL NAA+PROBE: NOT DETECTED
SODIUM SERPL-SCNC: 137 MMOL/L (ref 136–145)
TOTAL RATE: 14 BREATHS/MINUTE
TROPONIN T % DELTA: 11
TROPONIN T NUMERIC DELTA: 3 NG/L
TROPONIN T SERPL HS-MCNC: 28 NG/L
WBC NRBC COR # BLD AUTO: 8.44 10*3/MM3 (ref 3.4–10.8)

## 2025-06-07 PROCEDURE — 99285 EMERGENCY DEPT VISIT HI MDM: CPT

## 2025-06-07 PROCEDURE — 82375 ASSAY CARBOXYHB QUANT: CPT

## 2025-06-07 PROCEDURE — 36415 COLL VENOUS BLD VENIPUNCTURE: CPT

## 2025-06-07 PROCEDURE — 36600 WITHDRAWAL OF ARTERIAL BLOOD: CPT

## 2025-06-07 PROCEDURE — 94761 N-INVAS EAR/PLS OXIMETRY MLT: CPT

## 2025-06-07 PROCEDURE — 84484 ASSAY OF TROPONIN QUANT: CPT | Performed by: EMERGENCY MEDICINE

## 2025-06-07 PROCEDURE — 94640 AIRWAY INHALATION TREATMENT: CPT

## 2025-06-07 PROCEDURE — 99223 1ST HOSP IP/OBS HIGH 75: CPT | Performed by: INTERNAL MEDICINE

## 2025-06-07 PROCEDURE — 94660 CPAP INITIATION&MGMT: CPT

## 2025-06-07 PROCEDURE — 83050 HGB METHEMOGLOBIN QUAN: CPT

## 2025-06-07 PROCEDURE — 94799 UNLISTED PULMONARY SVC/PX: CPT

## 2025-06-07 PROCEDURE — 82805 BLOOD GASES W/O2 SATURATION: CPT

## 2025-06-07 PROCEDURE — 25010000002 MIDAZOLAM PER 1 MG: Performed by: EMERGENCY MEDICINE

## 2025-06-07 PROCEDURE — 85379 FIBRIN DEGRADATION QUANT: CPT | Performed by: EMERGENCY MEDICINE

## 2025-06-07 PROCEDURE — 93005 ELECTROCARDIOGRAM TRACING: CPT | Performed by: EMERGENCY MEDICINE

## 2025-06-07 PROCEDURE — 25010000002 METHYLPREDNISOLONE PER 125 MG: Performed by: EMERGENCY MEDICINE

## 2025-06-07 PROCEDURE — 85025 COMPLETE CBC W/AUTO DIFF WBC: CPT | Performed by: EMERGENCY MEDICINE

## 2025-06-07 PROCEDURE — 84145 PROCALCITONIN (PCT): CPT | Performed by: INTERNAL MEDICINE

## 2025-06-07 PROCEDURE — 71045 X-RAY EXAM CHEST 1 VIEW: CPT

## 2025-06-07 PROCEDURE — 87636 SARSCOV2 & INF A&B AMP PRB: CPT | Performed by: EMERGENCY MEDICINE

## 2025-06-07 PROCEDURE — 83615 LACTATE (LD) (LDH) ENZYME: CPT | Performed by: INTERNAL MEDICINE

## 2025-06-07 PROCEDURE — 80053 COMPREHEN METABOLIC PANEL: CPT | Performed by: EMERGENCY MEDICINE

## 2025-06-07 PROCEDURE — 83880 ASSAY OF NATRIURETIC PEPTIDE: CPT | Performed by: EMERGENCY MEDICINE

## 2025-06-07 RX ORDER — METHYLPREDNISOLONE SODIUM SUCCINATE 125 MG/2ML
125 INJECTION, POWDER, LYOPHILIZED, FOR SOLUTION INTRAMUSCULAR; INTRAVENOUS ONCE
Status: COMPLETED | OUTPATIENT
Start: 2025-06-07 | End: 2025-06-07

## 2025-06-07 RX ORDER — IPRATROPIUM BROMIDE AND ALBUTEROL SULFATE 2.5; .5 MG/3ML; MG/3ML
3 SOLUTION RESPIRATORY (INHALATION) ONCE
Status: COMPLETED | OUTPATIENT
Start: 2025-06-07 | End: 2025-06-07

## 2025-06-07 RX ORDER — MIDAZOLAM HYDROCHLORIDE 1 MG/ML
1 INJECTION, SOLUTION INTRAMUSCULAR; INTRAVENOUS ONCE
Status: COMPLETED | OUTPATIENT
Start: 2025-06-07 | End: 2025-06-07

## 2025-06-07 RX ORDER — KETOROLAC TROMETHAMINE 15 MG/ML
15 INJECTION, SOLUTION INTRAMUSCULAR; INTRAVENOUS EVERY 6 HOURS PRN
Status: COMPLETED | OUTPATIENT
Start: 2025-06-07 | End: 2025-06-09

## 2025-06-07 RX ORDER — DOXYCYCLINE 100 MG/1
100 CAPSULE ORAL ONCE
Status: COMPLETED | OUTPATIENT
Start: 2025-06-07 | End: 2025-06-07

## 2025-06-07 RX ORDER — HYDROCODONE POLISTIREX AND CHLORPHENIRAMINE POLISTIREX 10; 8 MG/5ML; MG/5ML
5 SUSPENSION, EXTENDED RELEASE ORAL EVERY 12 HOURS
Refills: 0 | Status: DISCONTINUED | OUTPATIENT
Start: 2025-06-07 | End: 2025-06-11 | Stop reason: HOSPADM

## 2025-06-07 RX ORDER — NICOTINE 21 MG/24HR
1 PATCH, TRANSDERMAL 24 HOURS TRANSDERMAL EVERY 24 HOURS
Status: DISCONTINUED | OUTPATIENT
Start: 2025-06-08 | End: 2025-06-11 | Stop reason: HOSPADM

## 2025-06-07 RX ADMIN — DOXYCYCLINE 100 MG: 100 CAPSULE ORAL at 21:16

## 2025-06-07 RX ADMIN — METHYLPREDNISOLONE SODIUM SUCCINATE 125 MG: 125 INJECTION, POWDER, LYOPHILIZED, FOR SOLUTION INTRAMUSCULAR; INTRAVENOUS at 21:16

## 2025-06-07 RX ADMIN — MIDAZOLAM HYDROCHLORIDE 1 MG: 1 INJECTION, SOLUTION INTRAMUSCULAR; INTRAVENOUS at 21:16

## 2025-06-07 RX ADMIN — IPRATROPIUM BROMIDE AND ALBUTEROL SULFATE 3 ML: 2.5; .5 SOLUTION RESPIRATORY (INHALATION) at 21:05

## 2025-06-08 LAB
ANION GAP SERPL CALCULATED.3IONS-SCNC: 9 MMOL/L (ref 5–15)
BASOPHILS # BLD AUTO: 0.01 10*3/MM3 (ref 0–0.2)
BASOPHILS NFR BLD AUTO: 0.1 % (ref 0–1.5)
BUN SERPL-MCNC: 10.6 MG/DL (ref 8–23)
BUN/CREAT SERPL: 13.9 (ref 7–25)
CALCIUM SPEC-SCNC: 8.7 MG/DL (ref 8.6–10.5)
CHLORIDE SERPL-SCNC: 94 MMOL/L (ref 98–107)
CO2 SERPL-SCNC: 32 MMOL/L (ref 22–29)
CREAT SERPL-MCNC: 0.76 MG/DL (ref 0.57–1)
DEPRECATED RDW RBC AUTO: 47.2 FL (ref 37–54)
EGFRCR SERPLBLD CKD-EPI 2021: 84.4 ML/MIN/1.73
EOSINOPHIL # BLD AUTO: 0 10*3/MM3 (ref 0–0.4)
EOSINOPHIL NFR BLD AUTO: 0 % (ref 0.3–6.2)
ERYTHROCYTE [DISTWIDTH] IN BLOOD BY AUTOMATED COUNT: 12.7 % (ref 12.3–15.4)
GLUCOSE SERPL-MCNC: 155 MG/DL (ref 65–99)
HCT VFR BLD AUTO: 50.1 % (ref 34–46.6)
HGB BLD-MCNC: 15.9 G/DL (ref 12–15.9)
IMM GRANULOCYTES # BLD AUTO: 0.02 10*3/MM3 (ref 0–0.05)
IMM GRANULOCYTES NFR BLD AUTO: 0.3 % (ref 0–0.5)
LYMPHOCYTES # BLD AUTO: 0.42 10*3/MM3 (ref 0.7–3.1)
LYMPHOCYTES NFR BLD AUTO: 6.3 % (ref 19.6–45.3)
MAGNESIUM SERPL-MCNC: 1.5 MG/DL (ref 1.6–2.4)
MCH RBC QN AUTO: 31.7 PG (ref 26.6–33)
MCHC RBC AUTO-ENTMCNC: 31.7 G/DL (ref 31.5–35.7)
MCV RBC AUTO: 100 FL (ref 79–97)
MONOCYTES # BLD AUTO: 0.03 10*3/MM3 (ref 0.1–0.9)
MONOCYTES NFR BLD AUTO: 0.4 % (ref 5–12)
NEUTROPHILS NFR BLD AUTO: 6.24 10*3/MM3 (ref 1.7–7)
NEUTROPHILS NFR BLD AUTO: 92.9 % (ref 42.7–76)
NRBC BLD AUTO-RTO: 0 /100 WBC (ref 0–0.2)
PLATELET # BLD AUTO: 146 10*3/MM3 (ref 140–450)
PMV BLD AUTO: 10.2 FL (ref 6–12)
POTASSIUM SERPL-SCNC: 4.2 MMOL/L (ref 3.5–5.2)
RBC # BLD AUTO: 5.01 10*6/MM3 (ref 3.77–5.28)
SODIUM SERPL-SCNC: 135 MMOL/L (ref 136–145)
TROPONIN T SERPL HS-MCNC: 27 NG/L
WBC NRBC COR # BLD AUTO: 6.72 10*3/MM3 (ref 3.4–10.8)

## 2025-06-08 PROCEDURE — 25010000002 AMPICILLIN-SULBACTAM PER 1.5 G: Performed by: NURSE PRACTITIONER

## 2025-06-08 PROCEDURE — 25010000002 HEPARIN (PORCINE) PER 1000 UNITS: Performed by: NURSE PRACTITIONER

## 2025-06-08 PROCEDURE — 94799 UNLISTED PULMONARY SVC/PX: CPT

## 2025-06-08 PROCEDURE — 80048 BASIC METABOLIC PNL TOTAL CA: CPT | Performed by: NURSE PRACTITIONER

## 2025-06-08 PROCEDURE — 99232 SBSQ HOSP IP/OBS MODERATE 35: CPT | Performed by: FAMILY MEDICINE

## 2025-06-08 PROCEDURE — 25010000002 DOXYCYCLINE 100 MG RECONSTITUTED SOLUTION 1 EACH VIAL: Performed by: NURSE PRACTITIONER

## 2025-06-08 PROCEDURE — 63710000001 REVEFENACIN 175 MCG/3ML SOLUTION: Performed by: NURSE PRACTITIONER

## 2025-06-08 PROCEDURE — 25010000002 METHYLPREDNISOLONE PER 125 MG: Performed by: NURSE PRACTITIONER

## 2025-06-08 PROCEDURE — 25010000002 KETOROLAC TROMETHAMINE PER 15 MG: Performed by: INTERNAL MEDICINE

## 2025-06-08 PROCEDURE — 85025 COMPLETE CBC W/AUTO DIFF WBC: CPT | Performed by: NURSE PRACTITIONER

## 2025-06-08 PROCEDURE — 84484 ASSAY OF TROPONIN QUANT: CPT | Performed by: NURSE PRACTITIONER

## 2025-06-08 PROCEDURE — 83735 ASSAY OF MAGNESIUM: CPT | Performed by: NURSE PRACTITIONER

## 2025-06-08 PROCEDURE — 94664 DEMO&/EVAL PT USE INHALER: CPT

## 2025-06-08 RX ORDER — SODIUM CHLORIDE 0.9 % (FLUSH) 0.9 %
10 SYRINGE (ML) INJECTION AS NEEDED
Status: DISCONTINUED | OUTPATIENT
Start: 2025-06-08 | End: 2025-06-11 | Stop reason: HOSPADM

## 2025-06-08 RX ORDER — ONDANSETRON 4 MG/1
4 TABLET, ORALLY DISINTEGRATING ORAL EVERY 6 HOURS PRN
Status: DISCONTINUED | OUTPATIENT
Start: 2025-06-08 | End: 2025-06-11 | Stop reason: HOSPADM

## 2025-06-08 RX ORDER — TRAZODONE HYDROCHLORIDE 100 MG/1
100 TABLET ORAL NIGHTLY
Status: DISCONTINUED | OUTPATIENT
Start: 2025-06-08 | End: 2025-06-11 | Stop reason: HOSPADM

## 2025-06-08 RX ORDER — NITROGLYCERIN 0.4 MG/1
0.4 TABLET SUBLINGUAL
Status: DISCONTINUED | OUTPATIENT
Start: 2025-06-08 | End: 2025-06-11 | Stop reason: HOSPADM

## 2025-06-08 RX ORDER — METHYLPREDNISOLONE SODIUM SUCCINATE 125 MG/2ML
62.5 INJECTION, POWDER, LYOPHILIZED, FOR SOLUTION INTRAMUSCULAR; INTRAVENOUS DAILY
Status: DISCONTINUED | OUTPATIENT
Start: 2025-06-08 | End: 2025-06-10

## 2025-06-08 RX ORDER — PANTOPRAZOLE SODIUM 40 MG/1
40 TABLET, DELAYED RELEASE ORAL
Status: DISCONTINUED | OUTPATIENT
Start: 2025-06-08 | End: 2025-06-11 | Stop reason: HOSPADM

## 2025-06-08 RX ORDER — ACETAMINOPHEN 160 MG/5ML
650 SOLUTION ORAL EVERY 4 HOURS PRN
Status: DISCONTINUED | OUTPATIENT
Start: 2025-06-08 | End: 2025-06-11 | Stop reason: HOSPADM

## 2025-06-08 RX ORDER — SODIUM CHLORIDE 9 MG/ML
40 INJECTION, SOLUTION INTRAVENOUS AS NEEDED
Status: DISCONTINUED | OUTPATIENT
Start: 2025-06-08 | End: 2025-06-11 | Stop reason: HOSPADM

## 2025-06-08 RX ORDER — GABAPENTIN 400 MG/1
800 CAPSULE ORAL EVERY 8 HOURS SCHEDULED
Status: DISCONTINUED | OUTPATIENT
Start: 2025-06-08 | End: 2025-06-11 | Stop reason: HOSPADM

## 2025-06-08 RX ORDER — HYDROCHLOROTHIAZIDE 25 MG/1
6.25 TABLET ORAL DAILY
Status: DISCONTINUED | OUTPATIENT
Start: 2025-06-08 | End: 2025-06-10

## 2025-06-08 RX ORDER — BUDESONIDE 0.5 MG/2ML
0.5 INHALANT ORAL
Status: DISCONTINUED | OUTPATIENT
Start: 2025-06-08 | End: 2025-06-11 | Stop reason: HOSPADM

## 2025-06-08 RX ORDER — BISACODYL 10 MG
10 SUPPOSITORY, RECTAL RECTAL DAILY PRN
Status: DISCONTINUED | OUTPATIENT
Start: 2025-06-08 | End: 2025-06-11 | Stop reason: HOSPADM

## 2025-06-08 RX ORDER — AMMONIUM LACTATE 12 G/100G
CREAM TOPICAL 2 TIMES DAILY PRN
Status: DISCONTINUED | OUTPATIENT
Start: 2025-06-08 | End: 2025-06-11 | Stop reason: HOSPADM

## 2025-06-08 RX ORDER — ACETAMINOPHEN 325 MG/1
650 TABLET ORAL EVERY 4 HOURS PRN
Status: DISCONTINUED | OUTPATIENT
Start: 2025-06-08 | End: 2025-06-11 | Stop reason: HOSPADM

## 2025-06-08 RX ORDER — POLYETHYLENE GLYCOL 3350 17 G/17G
17 POWDER, FOR SOLUTION ORAL DAILY PRN
Status: DISCONTINUED | OUTPATIENT
Start: 2025-06-08 | End: 2025-06-11 | Stop reason: HOSPADM

## 2025-06-08 RX ORDER — GUAIFENESIN 600 MG/1
1200 TABLET, EXTENDED RELEASE ORAL EVERY 12 HOURS SCHEDULED
Status: DISCONTINUED | OUTPATIENT
Start: 2025-06-08 | End: 2025-06-11 | Stop reason: HOSPADM

## 2025-06-08 RX ORDER — ACETAMINOPHEN 650 MG/1
650 SUPPOSITORY RECTAL EVERY 4 HOURS PRN
Status: DISCONTINUED | OUTPATIENT
Start: 2025-06-08 | End: 2025-06-11 | Stop reason: HOSPADM

## 2025-06-08 RX ORDER — SODIUM CHLORIDE 0.9 % (FLUSH) 0.9 %
10 SYRINGE (ML) INJECTION EVERY 12 HOURS SCHEDULED
Status: DISCONTINUED | OUTPATIENT
Start: 2025-06-08 | End: 2025-06-11 | Stop reason: HOSPADM

## 2025-06-08 RX ORDER — AMOXICILLIN 250 MG
2 CAPSULE ORAL 2 TIMES DAILY PRN
Status: DISCONTINUED | OUTPATIENT
Start: 2025-06-08 | End: 2025-06-11 | Stop reason: HOSPADM

## 2025-06-08 RX ORDER — HEPARIN SODIUM 5000 [USP'U]/ML
5000 INJECTION, SOLUTION INTRAVENOUS; SUBCUTANEOUS EVERY 8 HOURS SCHEDULED
Status: DISCONTINUED | OUTPATIENT
Start: 2025-06-08 | End: 2025-06-11 | Stop reason: HOSPADM

## 2025-06-08 RX ORDER — IPRATROPIUM BROMIDE AND ALBUTEROL SULFATE 2.5; .5 MG/3ML; MG/3ML
3 SOLUTION RESPIRATORY (INHALATION) EVERY 4 HOURS PRN
Status: DISCONTINUED | OUTPATIENT
Start: 2025-06-08 | End: 2025-06-11 | Stop reason: HOSPADM

## 2025-06-08 RX ORDER — BISOPROLOL FUMARATE 5 MG/1
10 TABLET, FILM COATED ORAL DAILY
Status: DISCONTINUED | OUTPATIENT
Start: 2025-06-08 | End: 2025-06-10

## 2025-06-08 RX ORDER — BISACODYL 5 MG/1
5 TABLET, DELAYED RELEASE ORAL DAILY PRN
Status: DISCONTINUED | OUTPATIENT
Start: 2025-06-08 | End: 2025-06-11 | Stop reason: HOSPADM

## 2025-06-08 RX ORDER — ARFORMOTEROL TARTRATE 15 UG/2ML
15 SOLUTION RESPIRATORY (INHALATION)
Status: DISCONTINUED | OUTPATIENT
Start: 2025-06-08 | End: 2025-06-11 | Stop reason: HOSPADM

## 2025-06-08 RX ORDER — ONDANSETRON 2 MG/ML
4 INJECTION INTRAMUSCULAR; INTRAVENOUS EVERY 6 HOURS PRN
Status: DISCONTINUED | OUTPATIENT
Start: 2025-06-08 | End: 2025-06-11 | Stop reason: HOSPADM

## 2025-06-08 RX ADMIN — AMPICILLIN SODIUM AND SULBACTAM SODIUM 3000 MG: 2; 1 INJECTION, POWDER, FOR SOLUTION INTRAMUSCULAR; INTRAVENOUS at 08:28

## 2025-06-08 RX ADMIN — HEPARIN SODIUM 5000 UNITS: 5000 INJECTION INTRAVENOUS; SUBCUTANEOUS at 06:39

## 2025-06-08 RX ADMIN — DOXYCYCLINE 100 MG: 100 INJECTION, POWDER, LYOPHILIZED, FOR SOLUTION INTRAVENOUS at 09:56

## 2025-06-08 RX ADMIN — TRAZODONE HYDROCHLORIDE 100 MG: 100 TABLET ORAL at 22:18

## 2025-06-08 RX ADMIN — Medication 10 ML: at 02:11

## 2025-06-08 RX ADMIN — GABAPENTIN 800 MG: 400 CAPSULE ORAL at 14:28

## 2025-06-08 RX ADMIN — NICOTINE 1 PATCH: 21 PATCH TRANSDERMAL at 23:46

## 2025-06-08 RX ADMIN — KETOROLAC TROMETHAMINE 15 MG: 15 INJECTION, SOLUTION INTRAMUSCULAR; INTRAVENOUS at 21:11

## 2025-06-08 RX ADMIN — TRAZODONE HYDROCHLORIDE 100 MG: 100 TABLET ORAL at 02:07

## 2025-06-08 RX ADMIN — Medication 10 ML: at 08:31

## 2025-06-08 RX ADMIN — KETOROLAC TROMETHAMINE 15 MG: 15 INJECTION, SOLUTION INTRAMUSCULAR; INTRAVENOUS at 01:58

## 2025-06-08 RX ADMIN — AMPICILLIN SODIUM AND SULBACTAM SODIUM 3000 MG: 2; 1 INJECTION, POWDER, FOR SOLUTION INTRAMUSCULAR; INTRAVENOUS at 21:11

## 2025-06-08 RX ADMIN — HEPARIN SODIUM 5000 UNITS: 5000 INJECTION INTRAVENOUS; SUBCUTANEOUS at 21:11

## 2025-06-08 RX ADMIN — SERTRALINE HYDROCHLORIDE 50 MG: 50 TABLET ORAL at 08:29

## 2025-06-08 RX ADMIN — Medication 10 ML: at 22:18

## 2025-06-08 RX ADMIN — REVEFENACIN 175 MCG: 175 SOLUTION RESPIRATORY (INHALATION) at 08:04

## 2025-06-08 RX ADMIN — AMPICILLIN SODIUM AND SULBACTAM SODIUM 3000 MG: 2; 1 INJECTION, POWDER, FOR SOLUTION INTRAMUSCULAR; INTRAVENOUS at 02:06

## 2025-06-08 RX ADMIN — PANTOPRAZOLE SODIUM 40 MG: 40 TABLET, DELAYED RELEASE ORAL at 06:40

## 2025-06-08 RX ADMIN — BISOPROLOL FUMARATE 10 MG: 5 TABLET, FILM COATED ORAL at 08:30

## 2025-06-08 RX ADMIN — HYDROCHLOROTHIAZIDE 6.25 MG: 25 TABLET ORAL at 08:46

## 2025-06-08 RX ADMIN — ARFORMOTEROL TARTRATE 15 MCG: 15 SOLUTION RESPIRATORY (INHALATION) at 08:04

## 2025-06-08 RX ADMIN — HEPARIN SODIUM 5000 UNITS: 5000 INJECTION INTRAVENOUS; SUBCUTANEOUS at 14:28

## 2025-06-08 RX ADMIN — GUAIFENESIN 1200 MG: 600 TABLET, EXTENDED RELEASE ORAL at 21:12

## 2025-06-08 RX ADMIN — AMPICILLIN SODIUM AND SULBACTAM SODIUM 3000 MG: 2; 1 INJECTION, POWDER, FOR SOLUTION INTRAMUSCULAR; INTRAVENOUS at 14:28

## 2025-06-08 RX ADMIN — DOXYCYCLINE 100 MG: 100 INJECTION, POWDER, LYOPHILIZED, FOR SOLUTION INTRAVENOUS at 22:17

## 2025-06-08 RX ADMIN — GUAIFENESIN 1200 MG: 600 TABLET, EXTENDED RELEASE ORAL at 08:46

## 2025-06-08 RX ADMIN — GABAPENTIN 800 MG: 400 CAPSULE ORAL at 21:11

## 2025-06-08 RX ADMIN — METHYLPREDNISOLONE SODIUM SUCCINATE 62.5 MG: 125 INJECTION, POWDER, LYOPHILIZED, FOR SOLUTION INTRAMUSCULAR; INTRAVENOUS at 08:30

## 2025-06-08 RX ADMIN — HYDROCODONE POLISTIREX AND CHLORPHENIRAMINE POLISTIREX 5 ML: 10; 8 SUSPENSION, EXTENDED RELEASE ORAL at 02:00

## 2025-06-08 RX ADMIN — BUDESONIDE 0.5 MG: 0.5 SUSPENSION RESPIRATORY (INHALATION) at 08:04

## 2025-06-08 RX ADMIN — GUAIFENESIN 1200 MG: 600 TABLET, EXTENDED RELEASE ORAL at 02:07

## 2025-06-08 RX ADMIN — NICOTINE 1 PATCH: 21 PATCH TRANSDERMAL at 02:07

## 2025-06-08 RX ADMIN — GABAPENTIN 800 MG: 400 CAPSULE ORAL at 06:40

## 2025-06-08 RX ADMIN — ARFORMOTEROL TARTRATE 15 MCG: 15 SOLUTION RESPIRATORY (INHALATION) at 18:42

## 2025-06-08 RX ADMIN — HYDROCODONE POLISTIREX AND CHLORPHENIRAMINE POLISTIREX 5 ML: 10; 8 SUSPENSION, EXTENDED RELEASE ORAL at 12:12

## 2025-06-08 RX ADMIN — HYDROCODONE POLISTIREX AND CHLORPHENIRAMINE POLISTIREX 5 ML: 10; 8 SUSPENSION, EXTENDED RELEASE ORAL at 22:17

## 2025-06-08 RX ADMIN — BUDESONIDE 0.5 MG: 0.5 SUSPENSION RESPIRATORY (INHALATION) at 18:42

## 2025-06-08 RX ADMIN — KETOROLAC TROMETHAMINE 15 MG: 15 INJECTION, SOLUTION INTRAMUSCULAR; INTRAVENOUS at 09:57

## 2025-06-08 NOTE — ED NOTES
Elvi Keita    Nursing Report ED to Floor:  Mental status: a/ox4  Ambulatory status: nonambulatory in ed  Oxygen Therapy:  6Ls NC  Cardiac Rhythm: nsr  Admitted from: home  Safety Concerns:  none  Precautions: none  Social Issues: family at bedside  ED Room #:  20    ED Nurse Phone Extension - [phone] or may call [phone].      HPI:   Chief Complaint   Patient presents with    Shortness of Breath       Past Medical History:  Past Medical History:   Diagnosis Date    COPD (chronic obstructive pulmonary disease)     Hypertension         Past Surgical History:  Past Surgical History:   Procedure Laterality Date    LAPAROSCOPIC TUBAL LIGATION      TOE SURGERY          Admitting Doctor:   Reyna Sanchez MD    Consulting Provider(s):  Consults       No orders found from 5/9/2025 to 6/8/2025.             Admitting Diagnosis:   The primary encounter diagnosis was Acute exacerbation of chronic obstructive pulmonary disease (COPD). Diagnoses of Acute on chronic respiratory failure with hypoxia and hypercapnia, Smoker, and History of hypertension were also pertinent to this visit.    Most Recent Vitals:   Vitals:    06/07/25 2200 06/07/25 2224 06/07/25 2229 06/07/25 2230   BP: 128/55   145/60   Pulse: 72 92 81 78   Resp:       Temp:       TempSrc:       SpO2: 95% (!) 80% 91% 91%   Weight:       Height:           Active LDAs/IV Access:   Lines, Drains & Airways       Active LDAs       Name Placement date Placement time Site Days    Peripheral IV 06/07/25 2115 20 G Left Antecubital 06/07/25 2115  Antecubital  less than 1                    Labs (abnormal labs have a star):   Labs Reviewed   COMPREHENSIVE METABOLIC PANEL - Abnormal; Notable for the following components:       Result Value    Glucose 133 (*)     Chloride 94 (*)     CO2 33.0 (*)     All other components within normal limits    Narrative:     GFR Categories in Chronic Kidney Disease (CKD)              GFR Category          GFR (mL/min/1.73)    Interpretation  G1                     90 or greater        Normal or high (1)  G2                    60-89                Mild decrease (1)  G3a                   45-59                Mild to moderate decrease  G3b                   30-44                Moderate to severe decrease  G4                    15-29                Severe decrease  G5                    14 or less           Kidney failure    (1)In the absence of evidence of kidney disease, neither GFR category G1 or G2 fulfill the criteria for CKD.    eGFR calculation 2021 CKD-EPI creatinine equation, which does not include race as a factor   BNP (IN-HOUSE) - Abnormal; Notable for the following components:    proBNP 1,097.0 (*)     All other components within normal limits    Narrative:     This assay is used as an aid in the diagnosis of individuals suspected of having heart failure. It can be used as an aid in the diagnosis of acute decompensated heart failure (ADHF) in patients presenting with signs and symptoms of ADHF to the emergency department (ED). In addition, NT-proBNP of <300 pg/mL indicates ADHF is not likely.    Age Range Result Interpretation  NT-proBNP Concentration (pg/mL:      <50             Positive            >450                   Gray                 300-450                    Negative             <300    50-75           Positive            >900                  Gray                300-900                  Negative            <300      >75             Positive            >1800                  Gray                300-1800                  Negative            <300   TROPONIN - Abnormal; Notable for the following components:    HS Troponin T 28 (*)     All other components within normal limits    Narrative:     High Sensitive Troponin T Reference Range:  <14.0 ng/L- Negative Female for AMI  <22.0 ng/L- Negative Male for AMI  >=14 - Abnormal Female indicating possible myocardial injury.  >=22 - Abnormal Male indicating possible myocardial injury.   Clinicians  would have to utilize clinical acumen, EKG, Troponin, and serial changes to determine if it is an Acute Myocardial Infarction or myocardial injury due to an underlying chronic condition.        CBC WITH AUTO DIFFERENTIAL - Abnormal; Notable for the following components:    RBC 5.44 (*)     Hemoglobin 17.4 (*)     Hematocrit 54.4 (*)     .0 (*)     Neutrophil % 76.2 (*)     Lymphocyte % 15.9 (*)     All other components within normal limits   BLOOD GAS, ARTERIAL W/CO-OXIMETRY - Abnormal; Notable for the following components:    pCO2, Arterial 60.4 (*)     pO2, Arterial 126.0 (*)     HCO3, Arterial 35.9 (*)     Base Excess, Arterial 7.9 (*)     Hematocrit, Blood Gas 54.0 (*)     Oxyhemoglobin 93.8 (*)     Carboxyhemoglobin 5.5 (*)     CO2 Content 37.7 (*)     pCO2, Temperature Corrected 60.4 (*)     pO2, Temperature Corrected 126 (*)     All other components within normal limits   COVID-19 AND FLU A/B, NP SWAB IN TRANSPORT MEDIA 1 HR TAT - Normal    Narrative:     Fact sheet for providers: https://www.fda.gov/media/687612/download    Fact sheet for patients: https://www.fda.gov/media/765618/download    Test performed by PCR.   D-DIMER, QUANTITATIVE - Normal    Narrative:     According to the assay 's published package insert, a normal (<0.50 MCGFEU/mL) D-dimer result in conjunction with a non-high clinical probability assessment, excludes deep vein thrombosis (DVT) and pulmonary embolism (PE) with high sensitivity.    D-dimer values increase with age and this can make VTE exclusion of an older population difficult. To address this, the [employer], based on best available evidence and recent guidelines, recommends that clinicians use age-adjusted D-dimer thresholds in patients greater than 50 years of age with: a) a low probability of PE who do not meet all Pulmonary Embolism Rule Out Criteria, or b) in those with intermediate probability of PE.   The formula for an age-adjusted  "D-dimer cut-off is \"age/100\".  For example, a 60 year old patient would have an age-adjusted cut-off of 0.60 MCGFEU/mL and an 80 year old 0.80 MCGFEU/mL.   COVID PRE-OP / PRE-PROCEDURE SCREENING ORDER (NO ISOLATION)    Narrative:     The following orders were created for panel order COVID PRE-OP / PRE-PROCEDURE SCREENING ORDER (NO ISOLATION) - Swab, Nasopharynx.  Procedure                               Abnormality         Status                     ---------                               -----------         ------                     COVID-19 and FLU A/B PCR...[[id number]]  Normal              Final result                 Please view results for these tests on the individual orders.   BLOOD GAS, ARTERIAL   HIGH SENSITIVITIY TROPONIN T 1HR   PROCALCITONIN   LACTATE DEHYDROGENASE   CBC AND DIFFERENTIAL    Narrative:     The following orders were created for panel order CBC & Differential.  Procedure                               Abnormality         Status                     ---------                               -----------         ------                     CBC Auto Differential[[id number]]        Abnormal            Final result                 Please view results for these tests on the individual orders.       Meds Given in ED:   Medications   midazolam (VERSED) injection 1 mg (1 mg Intravenous Given 6/7/25 2116)   doxycycline (MONODOX) capsule 100 mg (100 mg Oral Given 6/7/25 2116)   methylPREDNISolone sodium succinate (SOLU-Medrol) injection 125 mg (125 mg Intravenous Given 6/7/25 2116)   ipratropium-albuterol (DUO-NEB) nebulizer solution 3 mL (3 mL Nebulization Given 6/7/25 2105)     No current facility-administered medications for this encounter.       Last NIH score:                                                          Dysphagia screening results:        [place] Coma Scale:  No data recorded     CIWA:        Restraint Type:            Isolation Status:  No active isolations        "

## 2025-06-08 NOTE — ED PROVIDER NOTES
[place]    EMERGENCY DEPARTMENT ENCOUNTER      Pt Name: Elvi Keita  MRN: [id number]  YOB: 1955  Date of evaluation: 6/7/2025  Provider: Deion Ruiz MD    CHIEF COMPLAINT       Chief Complaint   Patient presents with    Shortness of Breath         HISTORY OF PRESENT ILLNESS   Elvi Keita is a 70 y.o. female who presents to the emergency department with complaint of shortness of breath for the past hr, pain the R ribs, sat 76 percent for EMS. Hx of COPD. Denies cardiac hx, hx of VTE.       Nursing notes were reviewed.    REVIEW OF SYSTEMS     ROS:  A chief complaint appropriate review of systems was completed and is negative except as noted in the HPI.      PAST MEDICAL HISTORY     Past Medical History:   Diagnosis Date    COPD (chronic obstructive pulmonary disease)     Hypertension          SURGICAL HISTORY       Past Surgical History:   Procedure Laterality Date    LAPAROSCOPIC TUBAL LIGATION      TOE SURGERY           CURRENT MEDICATIONS       Current Facility-Administered Medications:     acetaminophen (TYLENOL) tablet 650 mg, 650 mg, Oral, Q4H PRN **OR** acetaminophen (TYLENOL) 160 MG/5ML oral solution 650 mg, 650 mg, Oral, Q4H PRN **OR** acetaminophen (TYLENOL) suppository 650 mg, 650 mg, Rectal, Q4H PRN, Maria Alejandra Roper, ELZA    ammonium lactate (AMLACTIN) 12 % cream, , Topical, BID PRN, Maria Alejandra Roper APRN, Given at 06/09/25 2125    arformoterol (BROVANA) nebulizer solution 15 mcg, 15 mcg, Nebulization, BID - RT, 15 mcg at 06/10/25 0701 **AND** budesonide (PULMICORT) nebulizer solution 0.5 mg, 0.5 mg, Nebulization, BID - RT, 0.5 mg at 06/10/25 0701 **AND** revefenacin (YUPELRI) nebulizer solution 175 mcg, 175 mcg, Nebulization, Daily - RT, Maria Alejandra Roper APRN, 175 mcg at 06/10/25 0701    sennosides-docusate (PERICOLACE) 8.6-50 MG per tablet 2 tablet, 2 tablet, Oral, BID PRN, 2 tablet at 06/09/25 0935 **AND** polyethylene glycol (MIRALAX) packet 17 g, 17 g, Oral, Daily PRN **AND**  bisacodyl (DULCOLAX) EC tablet 5 mg, 5 mg, Oral, Daily PRN **AND** bisacodyl (DULCOLAX) suppository 10 mg, 10 mg, Rectal, Daily PRN, Maria Alejandra Roper APRN    bisoprolol (ZEBeta) tablet 10 mg, 10 mg, Oral, Daily, 10 mg at 06/10/25 0805 **AND** hydroCHLOROthiazide tablet 12.5 mg, 12.5 mg, Oral, Daily, Magrret Lau APRN, 12.5 mg at 06/10/25 0802    cefuroxime (CEFTIN) tablet 750 mg, 750 mg, Oral, TID, Chelly Sandhu MD    doxycycline (MONODOX) capsule 100 mg, 100 mg, Oral, BID, Maria Alejandra Roper APRN, 100 mg at 06/10/25 0805    gabapentin (NEURONTIN) capsule 800 mg, 800 mg, Oral, Q8H, Maria Alejandra Roper APRN, 800 mg at 06/10/25 0603    guaiFENesin (MUCINEX) 12 hr tablet 1,200 mg, 1,200 mg, Oral, Q12H, Maria Alejandra Roper APRN, 1,200 mg at 06/10/25 0804    heparin (porcine) 5000 UNIT/ML injection 5,000 Units, 5,000 Units, Subcutaneous, Q8H, Maria Alejandra Roper APRN, 5,000 Units at 06/10/25 0603    Hydrocod Yanick-Chlorphe Yanick ER (TUSSIONEX PENNKINETIC) 10-8 MG/5ML ER suspension 5 mL, 5 mL, Oral, Q12H, Reyna Sanchez MD, 5 mL at 06/09/25 2346    hydrOXYzine (ATARAX) tablet 25 mg, 25 mg, Oral, TID PRN, Chelly Sandhu MD, 25 mg at 06/09/25 1441    ipratropium-albuterol (DUO-NEB) nebulizer solution 3 mL, 3 mL, Nebulization, Q4H PRN, Maria Alejandra Roper APRN    ketorolac (TORADOL) injection 15 mg, 15 mg, Intravenous, Q6H PRN, Margret Lau APRN    methylPREDNISolone sodium succinate (SOLU-Medrol) injection 62.5 mg, 62.5 mg, Intravenous, Daily, Maria Alejandra Roper APRN, 62.5 mg at 06/10/25 0806    nicotine (NICODERM CQ) 21 MG/24HR patch 1 patch, 1 patch, Transdermal, Q24H, Maria Alejandra Roper APRN, 1 patch at 06/09/25 2351    nicotine polacrilex (NICORETTE) gum 2 mg, 2 mg, Mouth/Throat, Q1H PRN, Maria Alejandra Roper APRN    nitroglycerin (NITROSTAT) SL tablet 0.4 mg, 0.4 mg, Sublingual, Q5 Min PRN, Maria Alejandra Roper APRN    ondansetron ODT (ZOFRAN-ODT) disintegrating tablet 4 mg, 4 mg, Oral, Q6H PRN **OR** ondansetron (ZOFRAN) injection 4 mg, 4  mg, Intravenous, Q6H PRN, Vibbert, Maria Alejandra M, APRN    pantoprazole (PROTONIX) EC tablet 40 mg, 40 mg, Oral, Q AM, Vibbert, Maria Alejandra M, APRN, 40 mg at 06/10/25 0603    sertraline (ZOLOFT) tablet 50 mg, 50 mg, Oral, Daily, Vibbert, Maria Alejandra M, APRN, 50 mg at 06/10/25 0805    sodium chloride 0.9 % flush 10 mL, 10 mL, Intravenous, Q12H, Vibbert, Maria Alejandra M, APRN, 10 mL at 06/09/25 2124    sodium chloride 0.9 % flush 10 mL, 10 mL, Intravenous, PRN, Vibbert, Maria Alejandra M, APRN    sodium chloride 0.9 % infusion 40 mL, 40 mL, Intravenous, PRN, Vibbert, Maria Alejandra M, APRN    traZODone (DESYREL) tablet 100 mg, 100 mg, Oral, Nightly, Vibbert, Maria Alejandra M, APRN, 100 mg at 06/09/25 2129    ALLERGIES     Fish oil    FAMILY HISTORY       Family History   Problem Relation Age of Onset    COPD Mother     No Known Problems Father           SOCIAL HISTORY       Social History     Socioeconomic History    Marital status:    Tobacco Use    Smoking status: Every Day     Current packs/day: 1.00     Average packs/day: 1 pack/day for 54.4 years (54.4 ttl pk-yrs)     Types: Cigarettes     Start date: 1971    Smokeless tobacco: Never    Tobacco comments:     Patient says she has to quit smoking after this episode.  has already quit smoking.    Vaping Use    Vaping status: Never Used   Substance and Sexual Activity    Alcohol use: Never    Drug use: Never    Sexual activity: Defer         PHYSICAL EXAM    (up to 7 for level 4, 8 or more for level 5)     Vitals:    06/10/25 0354 06/10/25 0701 06/10/25 0802 06/10/25 1108   BP: (!) 201/82  168/65 132/59   BP Location: Left arm   Right arm   Patient Position: Lying   Lying   Pulse: 70 54 63 66   Resp: 18 18  18   Temp: 97 °F (36.1 °C)   97.8 °F (36.6 °C)   TempSrc: Oral   Oral   SpO2: 90% 98%  94%   Weight: 72.8 kg (160 lb 6.4 oz)      Height:           General: Moderate distress  HEENT: Conjunctivae normal.  Neck: Trachea midline.  Cardiac: Heart regular rate, rhythm, no murmurs, rubs, or gallops  Lungs: Tachypneic,  diffuse exp wheezes  Abdomen: Abdomen is soft, nontender, nondistended. There are no firm or pulsatile masses, no rebound rigidity or guarding.   Musculoskeletal: No deformity.  Neuro: Alert         DIAGNOSTIC RESULTS     EKG: All EKGs are interpreted by the Emergency Department Physician who either signs or Co-signs this chart in the absence of a cardiologist.    ECG 12 Lead Dyspnea   Preliminary Result   Test Reason : Dyspnea   Blood Pressure :   */*   mmHG   Vent. Rate :  72 BPM     Atrial Rate :  72 BPM      P-R Int : 172 ms          QRS Dur :  88 ms       QT Int : 406 ms       P-R-T Axes :  77  20  33 degrees     QTcB Int : 444 ms      Normal sinus rhythm   Normal ECG   When compared with ECG of 21-May-2025 13:06,   CT interval has decreased      Referred By: EDMD           Confirmed By:       Telemetry Scan   Final Result            RADIOLOGY:   [x] Radiologist's Report Reviewed:  XR Chest 1 View   Final Result   Impression:   Chronic interstitial disease and emphysema without superimposed acute process of the chest.               Electronically Signed: Claude Jon MD     6/7/2025 9:56 PM EDT     Workstation ID: [id number]          I ordered and independently reviewed the above noted radiographic studies.        LABS:    I have reviewed and interpreted all of the currently available lab results from this visit (if applicable):  Results for orders placed or performed during the hospital encounter of 06/07/25   ECG 12 Lead Dyspnea    Collection Time: 06/07/25  9:03 PM   Result Value Ref Range    QT Interval 406 ms    QTC Interval 444 ms   Comprehensive Metabolic Panel    Collection Time: 06/07/25  9:05 PM    Specimen: Blood   Result Value Ref Range    Glucose 133 (H) 65 - 99 mg/dL    BUN 9.0 8.0 - 23.0 mg/dL    Creatinine 0.82 0.57 - 1.00 mg/dL    Sodium 137 136 - 145 mmol/L    Potassium 4.6 3.5 - 5.2 mmol/L    Chloride 94 (L) 98 - 107 mmol/L    CO2 33.0 (H) 22.0 - 29.0 mmol/L    Calcium 9.4 8.6 - 10.5 mg/dL     Total Protein 6.6 6.0 - 8.5 g/dL    Albumin 3.9 3.5 - 5.2 g/dL    ALT (SGPT) 12 1 - 33 U/L    AST (SGOT) 14 1 - 32 U/L    Alkaline Phosphatase 100 39 - 117 U/L    Total Bilirubin 0.4 0.0 - 1.2 mg/dL    Globulin 2.7 gm/dL    A/G Ratio 1.4 g/dL    BUN/Creatinine Ratio 11.0 7.0 - 25.0    Anion Gap 10.0 5.0 - 15.0 mmol/L    eGFR 77.1 >60.0 mL/min/1.73   BNP    Collection Time: 06/07/25  9:05 PM    Specimen: Blood   Result Value Ref Range    proBNP 1,097.0 (H) 0.0 - 900.0 pg/mL   D-dimer, Quantitative    Collection Time: 06/07/25  9:05 PM    Specimen: Blood   Result Value Ref Range    D-Dimer, Quantitative 0.60 0.00 - 0.70 MCGFEU/mL   High Sensitivity Troponin T    Collection Time: 06/07/25  9:05 PM    Specimen: Blood   Result Value Ref Range    HS Troponin T 28 (H) <14 ng/L   CBC Auto Differential    Collection Time: 06/07/25  9:05 PM    Specimen: Blood   Result Value Ref Range    WBC 8.44 3.40 - 10.80 10*3/mm3    RBC 5.44 (H) 3.77 - 5.28 10*6/mm3    Hemoglobin 17.4 (H) 12.0 - 15.9 g/dL    Hematocrit 54.4 (H) 34.0 - 46.6 %    .0 (H) 79.0 - 97.0 fL    MCH 32.0 26.6 - 33.0 pg    MCHC 32.0 31.5 - 35.7 g/dL    RDW 12.6 12.3 - 15.4 %    RDW-SD 46.9 37.0 - 54.0 fl    MPV 10.1 6.0 - 12.0 fL    Platelets 153 140 - 450 10*3/mm3    Neutrophil % 76.2 (H) 42.7 - 76.0 %    Lymphocyte % 15.9 (L) 19.6 - 45.3 %    Monocyte % 6.3 5.0 - 12.0 %    Eosinophil % 0.8 0.3 - 6.2 %    Basophil % 0.4 0.0 - 1.5 %    Immature Grans % 0.4 0.0 - 0.5 %    Neutrophils, Absolute 6.44 1.70 - 7.00 10*3/mm3    Lymphocytes, Absolute 1.34 0.70 - 3.10 10*3/mm3    Monocytes, Absolute 0.53 0.10 - 0.90 10*3/mm3    Eosinophils, Absolute 0.07 0.00 - 0.40 10*3/mm3    Basophils, Absolute 0.03 0.00 - 0.20 10*3/mm3    Immature Grans, Absolute 0.03 0.00 - 0.05 10*3/mm3    nRBC 0.0 0.0 - 0.2 /100 WBC   Procalcitonin    Collection Time: 06/07/25  9:05 PM    Specimen: Blood   Result Value Ref Range    Procalcitonin 0.07 0.00 - 0.25 ng/mL   Lactate Dehydrogenase     Collection Time: 06/07/25  9:05 PM    Specimen: Blood   Result Value Ref Range     135 - 214 U/L   COVID-19 and FLU A/B PCR, 1 HR TAT - Swab, Nasopharynx    Collection Time: 06/07/25  9:07 PM    Specimen: Nasopharynx; Swab   Result Value Ref Range    COVID19 Not Detected Not Detected - Ref. Range    Influenza A PCR Not Detected Not Detected    Influenza B PCR Not Detected Not Detected   Blood Gas, Arterial With Co-Ox    Collection Time: 06/07/25  9:27 PM    Specimen: Arterial Blood   Result Value Ref Range    Site Right Radial     Santiago's Test Positive     pH, Arterial 7.382 7.350 - 7.450 pH units    pCO2, Arterial 60.4 (H) 35.0 - 45.0 mm Hg    pO2, Arterial 126.0 (H) 83.0 - 108.0 mm Hg    HCO3, Arterial 35.9 (H) 20.0 - 26.0 mmol/L    Base Excess, Arterial 7.9 (H) 0.0 - 2.0 mmol/L    Hemoglobin, Blood Gas 17.6 14 - 18 g/dL    Hematocrit, Blood Gas 54.0 (H) 38.0 - 51.0 %    Oxyhemoglobin 93.8 (L) 94 - 99 %    Methemoglobin 0.00 0.00 - 1.50 %    Carboxyhemoglobin 5.5 (H) 0 - 2 %    CO2 Content 37.7 (H) 22 - 33 mmol/L    Temperature 37.0     Barometric Pressure for Blood Gas      Modality BiPap     FIO2 50 %    Rate 14 Breaths/minute    PIP 0 cmH2O    IPAP 12     EPAP 6     pH, Temp Corrected 7.382 pH Units    pCO2, Temperature Corrected 60.4 (H) 35 - 45 mm Hg    pO2, Temperature Corrected 126 (H) 83 - 108 mm Hg   High Sensitivity Troponin T 1Hr    Collection Time: 06/07/25 10:25 PM    Specimen: Arm, Left; Blood   Result Value Ref Range    HS Troponin T 31 (H) <14 ng/L    Troponin T Numeric Delta 3 ng/L    Troponin T % Delta 11 Abnormal if >/= 20%   High Sensitivity Troponin T    Collection Time: 06/08/25  3:43 AM    Specimen: Blood   Result Value Ref Range    HS Troponin T 27 (H) <14 ng/L   Basic Metabolic Panel    Collection Time: 06/08/25  3:43 AM    Specimen: Blood   Result Value Ref Range    Glucose 155 (H) 65 - 99 mg/dL    BUN 10.6 8.0 - 23.0 mg/dL    Creatinine 0.76 0.57 - 1.00 mg/dL    Sodium 135 (L) 136  - 145 mmol/L    Potassium 4.2 3.5 - 5.2 mmol/L    Chloride 94 (L) 98 - 107 mmol/L    CO2 32.0 (H) 22.0 - 29.0 mmol/L    Calcium 8.7 8.6 - 10.5 mg/dL    BUN/Creatinine Ratio 13.9 7.0 - 25.0    Anion Gap 9.0 5.0 - 15.0 mmol/L    eGFR 84.4 >60.0 mL/min/1.73   CBC Auto Differential    Collection Time: 06/08/25  3:43 AM    Specimen: Blood   Result Value Ref Range    WBC 6.72 3.40 - 10.80 10*3/mm3    RBC 5.01 3.77 - 5.28 10*6/mm3    Hemoglobin 15.9 12.0 - 15.9 g/dL    Hematocrit 50.1 (H) 34.0 - 46.6 %    .0 (H) 79.0 - 97.0 fL    MCH 31.7 26.6 - 33.0 pg    MCHC 31.7 31.5 - 35.7 g/dL    RDW 12.7 12.3 - 15.4 %    RDW-SD 47.2 37.0 - 54.0 fl    MPV 10.2 6.0 - 12.0 fL    Platelets 146 140 - 450 10*3/mm3    Neutrophil % 92.9 (H) 42.7 - 76.0 %    Lymphocyte % 6.3 (L) 19.6 - 45.3 %    Monocyte % 0.4 (L) 5.0 - 12.0 %    Eosinophil % 0.0 (L) 0.3 - 6.2 %    Basophil % 0.1 0.0 - 1.5 %    Immature Grans % 0.3 0.0 - 0.5 %    Neutrophils, Absolute 6.24 1.70 - 7.00 10*3/mm3    Lymphocytes, Absolute 0.42 (L) 0.70 - 3.10 10*3/mm3    Monocytes, Absolute 0.03 (L) 0.10 - 0.90 10*3/mm3    Eosinophils, Absolute 0.00 0.00 - 0.40 10*3/mm3    Basophils, Absolute 0.01 0.00 - 0.20 10*3/mm3    Immature Grans, Absolute 0.02 0.00 - 0.05 10*3/mm3    nRBC 0.0 0.0 - 0.2 /100 WBC   Magnesium    Collection Time: 06/08/25  3:43 AM    Specimen: Blood   Result Value Ref Range    Magnesium 1.5 (L) 1.6 - 2.4 mg/dL   Basic Metabolic Panel    Collection Time: 06/10/25  3:35 AM    Specimen: Blood   Result Value Ref Range    Glucose 88 65 - 99 mg/dL    BUN 15.1 8.0 - 23.0 mg/dL    Creatinine 0.65 0.57 - 1.00 mg/dL    Sodium 135 (L) 136 - 145 mmol/L    Potassium 4.4 3.5 - 5.2 mmol/L    Chloride 94 (L) 98 - 107 mmol/L    CO2 36.0 (H) 22.0 - 29.0 mmol/L    Calcium 8.7 8.6 - 10.5 mg/dL    BUN/Creatinine Ratio 23.2 7.0 - 25.0    Anion Gap 5.0 5.0 - 15.0 mmol/L    eGFR 94.9 >60.0 mL/min/1.73        If labs were ordered, I independently reviewed the results and  considered them in treating the patient.      EMERGENCY DEPARTMENT COURSE and DIFFERENTIAL DIAGNOSIS/MDM:   Vitals:  AS OF 11:56 EDT    BP - 132/59  HR - 66  TEMP - 97.8 °F (36.6 °C) (Oral)  O2 SATS - 94%        Discussion below represents my analysis of pertinent findings related to patient's condition, differential diagnosis, treatment plan and final disposition.      Differential diagnosis:  The differential diagnosis associated with the patient's presentation includes: copd, chf, pna, ptx, pe, acs, dysrhythmia      ED Course:    ED Course as of 06/10/25 1156   Sat Jun 07, 202507, 2025 2222 Patient presents with acute COPD exacerbation. Worsening shortness of breath, dry cough, wheezing over the past couple of days along with pleuritic right-sided chest pain. In distress on arrival, saturating in the low 80s on 6 L, placed on BiPAP. Given nebs, Solu-Medrol, Doxy with significant improvement. Currently feels much better on 12/6 and 50% on the BiPAP. Chest x-ray is negative, lab work all looks okay including negative D-dimer. [NS]   2222 I discussed case with hospitalist Dr. Sanchez.  Discussed history, presentation, workup.  Accepts patient for admission. [NS]      ED Course User Index  [NS] Deion Ruiz MD         CRITICAL CARE TIME    Approximately 35 minutes of discontinuous critical care time was provided to this patient by myself absent of any time spent performing procedures.  Patient presents critically ill with acute copd exacerbation with acute mixed respiratory failure placing the CV, resp, neuro, renal systems at risk requiring the following interventions: Bipap therapy, abx, steroids, interpretation of labs/ecg/imaging, freq reassessment, coordination of admission with the following response: resolution of hypoxia.  Patient at high risk of deterioration and possibly death without these interventions.      FINAL IMPRESSION      1. Acute exacerbation of chronic obstructive pulmonary disease (COPD)    2.  Acute on chronic respiratory failure with hypoxia and hypercapnia    3. Smoker    4. History of hypertension          DISPOSITION/PLAN     ED Disposition       ED Disposition   Decision to Admit    Condition   --    Comment   Level of Care: Telemetry [5]   Diagnosis: COPD exacerbation [[id number]]   Admitting Physician: CALVIN JONES [[id number]]   Attending Physician: CALVIN JONES [[id number]]   Certification: I Certify That Inpatient Hospital Services Are Medically Necessary For Greater Than 2 Midnights                   Comment: Please note this report has been produced using speech recognition software.      Deion Ruiz MD  Attending Emergency Physician             Deion Ruiz MD  06/10/25 1156

## 2025-06-08 NOTE — PLAN OF CARE
Goal Outcome Evaluation:                    Patient admitted to floor from ED. NSR on monitor. O2 90-91% on 6L NC. A&O x4.   Patient complaining of pleuritic pain in both shoulders when deep breathing or moving. Gave PRN medications and patient stated they were effective. Patient was able to use BSC as a x1 assist.   Educated patient on smoking cessation. She states she wants to quit, and that her  already has. States she does not feel confident in her motivation to quit smoking due to failed attempts in the past.   Educated patient on IS and flutter valve use. Patent demonstrated proper use of both devices.   RT was bringing NIPPV machine into room and patient refused stating that she could not wear it as it made her feel claustrophobic and anxious.   Bed is in lowest position, alarm is active and call light is within reach. Will continue to monitor.

## 2025-06-08 NOTE — PROGRESS NOTES
[facility] Medicine Services  PROGRESS NOTE    Patient Name: Elvi Keita  : 1955  MRN: [id number]    Date of Admission: 2025  Primary Care Physician: Provider, No Known    Subjective   Subjective     CC:  Short of breath    HPI:  The patient is a 70-year-old admitted with COPD exacerbation and worsening oxygen requirements.  She tells me she usually wears 3 L nasal cannula at all times but had increased it to 4 and had continued symptoms shortness of breath pain with inspiration and movement.  She feels 50% better today than before she came.  She has been treated with IV antibiotics, IV steroids and IV Toradol.  We discussed CODE STATUS because she refused to wear BiPAP and she states for now she would like to be a full code but has not really considered the options.  We had a lengthy discussion about what life support might look like in a patient with COPD she plans to discuss these things with her family.  She would designate her daughter Margret as her healthcare surrogate if she were unable to make decisions for self.      Objective   Objective     Vital Signs:   Temp:  [98 °F (36.7 °C)-98.3 °F (36.8 °C)] 98 °F (36.7 °C)  Heart Rate:  [70-92] 72  Resp:  [24-29] 24  BP: (112-165)/(53-79) 148/65  Flow (L/min) (Oxygen Therapy):  [6] 6     Physical Exam:  Constitutional: No acute distress, awake, alert  HENT: NCAT, mucous membranes moist  Respiratory: Decreased breath sounds with faint crackles bilaterally, respiratory effort normal   Cardiovascular: RRR  Gastrointestinal: Positive bowel sounds, soft, nontender, nondistended  Musculoskeletal: No bilateral ankle edema  Psychiatric: Appropriate affect, cooperative  Neurologic: Oriented x 3, strength symmetric in all extremities, Cranial Nerves grossly intact to confrontation, speech clear  Skin: No rashes      Results Reviewed:  LAB RESULTS:      Lab 25  0343 25  2225 25  2105   WBC 6.72  --  8.44   HEMOGLOBIN  15.9  --  17.4*   HEMATOCRIT 50.1*  --  54.4*   PLATELETS 146  --  153   NEUTROS ABS 6.24  --  6.44   IMMATURE GRANS (ABS) 0.02  --  0.03   LYMPHS ABS 0.42*  --  1.34   MONOS ABS 0.03*  --  0.53   EOS ABS 0.00  --  0.07   .0*  --  100.0*   PROCALCITONIN  --   --  0.07   LDH  --   --  213   D DIMER QUANT  --   --  0.60   HSTROP T 27* 31* 28*         Lab 06/08/25  0343 06/07/25 2105   SODIUM 135* 137   POTASSIUM 4.2 4.6   CHLORIDE 94* 94*   CO2 32.0* 33.0*   ANION GAP 9.0 10.0   BUN 10.6 9.0   CREATININE 0.76 0.82   EGFR 84.4 77.1   GLUCOSE 155* 133*   CALCIUM 8.7 9.4   MAGNESIUM 1.5*  --          Lab 06/07/25 2105   TOTAL PROTEIN 6.6   ALBUMIN 3.9   GLOBULIN 2.7   ALT (SGPT) 12   AST (SGOT) 14   BILIRUBIN 0.4   ALK PHOS 100         Lab 06/08/25  0343 06/07/25  2225 06/07/25 2105   PROBNP  --   --  1,097.0*   HSTROP T 27* 31* 28*                 Lab 06/07/25 2127   PH, ARTERIAL 7.382   PCO2, ARTERIAL 60.4*   PO2 .0*   FIO2 50   HCO3 ART 35.9*   BASE EXCESS ART 7.9*   CARBOXYHEMOGLOBIN 5.5*     Brief Urine Lab Results  (Last result in the past 365 days)        Color   Clarity   Blood   Leuk Est   Nitrite   Protein   CREAT   Urine HCG        10/14/24 1518 Yellow   Clear   Negative   Negative   Negative   Negative                   Microbiology Results Abnormal       None            XR Chest 1 View  Result Date: 6/7/2025  XR CHEST 1 VW Date of Exam: 6/7/2025 9:03 PM EDT Indication: sob Comparison: 5/21/2025. Findings: Stable chronic interstitial disease and emphysema. No new lung opacity. No pneumothorax or pleural effusion. Heart size and pulmonary vasculature appear within normal limits. No acute osseous abnormalities.     Impression: Impression: Chronic interstitial disease and emphysema without superimposed acute process of the chest. Electronically Signed: Claude Jon MD  6/7/2025 9:56 PM EDT  Workstation ID: [id number]      Results for orders placed during the hospital encounter of  08/30/24    Adult Transthoracic Echo Complete W/ Cont if Necessary Per Protocol    Interpretation Summary    Left ventricular ejection fraction appears to be greater than 70%.    Left ventricular wall thickness is consistent with mild posterior asymmetric hypertrophy.    The right ventricular cavity is mild to moderately dilated.    The left atrial cavity is mildly dilated.    Left atrial volume is mildly increased.    The right atrial cavity is moderately  dilated.    Estimated right ventricular systolic pressure from tricuspid regurgitation is normal (<35 mmHg).      Current medications:  Scheduled Meds:ampicillin-sulbactam, 3,000 mg, Intravenous, Q6H  arformoterol, 15 mcg, Nebulization, BID - RT   And  budesonide, 0.5 mg, Nebulization, BID - RT   And  revefenacin, 175 mcg, Nebulization, Daily - RT  bisoprolol, 10 mg, Oral, Daily   And  hydroCHLOROthiazide, 6.25 mg, Oral, Daily  doxycycline, 100 mg, Intravenous, BID  gabapentin, 800 mg, Oral, Q8H  guaiFENesin, 1,200 mg, Oral, Q12H  heparin (porcine), 5,000 Units, Subcutaneous, Q8H  Hydrocod Yanick-Chlorphe Yanick ER, 5 mL, Oral, Q12H  methylPREDNISolone sodium succinate, 62.5 mg, Intravenous, Daily  nicotine, 1 patch, Transdermal, Q24H  pantoprazole, 40 mg, Oral, Q AM  sertraline, 50 mg, Oral, Daily  sodium chloride, 10 mL, Intravenous, Q12H  traZODone, 100 mg, Oral, Nightly      Continuous Infusions:   PRN Meds:.  acetaminophen **OR** acetaminophen **OR** acetaminophen    ammonium lactate    senna-docusate sodium **AND** polyethylene glycol **AND** bisacodyl **AND** bisacodyl    ipratropium-albuterol    ketorolac    nicotine polacrilex    nitroglycerin    ondansetron ODT **OR** ondansetron    sodium chloride    sodium chloride    Assessment & Plan   Assessment & Plan     Active Hospital Problems    Diagnosis  POA    **COPD exacerbation [J44.1]  Yes    Peripheral polyneuropathy [G62.9]  Unknown    Lower extremity venous stasis [I87.8]  Unknown    Mood disorder [F39]   Unknown    Essential hypertension [I10]  Yes    Acute on chronic respiratory failure with hypoxia and hypercapnia [J96.21, J96.22]  Yes    Nicotine dependence [F17.200]  Yes      Resolved Hospital Problems   No resolved problems to display.        Brief Hospital Course to date:  Elvi Keita is a 70 y.o. female with past medical history of chronic respiratory failure, COPD, hypertension, neuropathy, venous stasis and mood disorder who presented with worsening respiratory failure and acute exacerbation of her COPD.    COPD exacerbation  A/C respiratory failure w/ hypoxia / hypercapnia  Pleurisy  - baseline oxygen 4 liters NC, currently on 6 liters  - yupelri / brovana /pulmicort nebs scheduled  - duo nebs PRN  - methylprednisolone 62.5 mg IV daily  - unasyn / doxy  - pulmonary toilet / flutter valve / IS  - Patient declined BiPAP but wants to remain full code  - wean oxygen as tolerated  - Continue mucinex  - hold azithromycin, taking daily prophylactic dose  -Continue Toradol every 6 as needed     Elevated BNP / Elevated troponin / HTN  - denies chest pain  - troponin flat 28-31-27  - EKG normal sinus rhythm  - continue bisoprolol / hctz  - ECHO 8/2024 w/ EF 70%     BLE venous stasis  - WOC evaluation  - lachydrin lotion  - has upcoming evaluation per daughter by a vein specialist     Nicotine dependence  - nicotine patch  - smoking cessation education        Expected Discharge Location and Transportation: Home  Expected Discharge   Expected Discharge Date: 6/9/2025; Expected Discharge Time:      VTE Prophylaxis:  Pharmacologic VTE prophylaxis orders are present.         AM-PAC 6 Clicks Score (PT): 12 (06/08/25 4784)    CODE STATUS:   Code Status and Medical Interventions: CPR (Attempt to Resuscitate); Full Support   Ordered at: 06/07/25 4607     Code Status (Patient has no pulse and is not breathing):    CPR (Attempt to Resuscitate)     Medical Interventions (Patient has pulse or is breathing):    Full Support        Roxy Zafar MD  06/08/25

## 2025-06-08 NOTE — H&P
[facility] Medicine Services  HISTORY AND PHYSICAL    Patient Name: Elvi Keita  : 1955  MRN: [id number]  Primary Care Physician: Provider, No Known  Date of admission: 2025    Subjective   Subjective     Chief Complaint:  Shortness of breath    HPI:  Elvi Keita is a 70 y.o. female with PMHx of COPD on home oxygen 4 liters, tobacco use, HTN, HLD, neuropathy, mood disorder who presents to the ED for evaluation of shortness of breath. Pt reports increased shortness of breath over the past few days, took a nap today and woke up at 7pm with extreme shortness of breath and respiratory distress. Felt like she could not take a breath at all. Has been using her daily Anoro inhaler along with albuterol ~ 4 times a day without improvement. Was treated for COPD exacerbation with doxycycline and prednisone . She is taking daily prophylactic azithromycin. She does not see pulmonary outpatient, she is being managed by her PCP.    On arrival, she was placed on NRB mask then BIPAP , initially at 100% FiO2 but quickly weaned to 50% and now on 6 liters NC as she has difficulty tolerating the BIPAP. She had duo neb, solumedrol and doxycycline and reports improvement in her shortness of breath. Chest xray showing chronic interstitial disease and emphysema. Pertinent labs: proBNP 1097, HS trop 28 w/ repeat 31, normal d dimer / lactic acid / WBC; Hgb 17.4; COVID/Flu negative. ABG w/ pH 7.382, pCO2 60.4, pO2 126, HCO3 35.9 on BIPAP at 50%. She is afebrile and her vitals are stable. She will be admitted to hospital medicine for further management.     Review of Systems   Constitutional:  Positive for activity change and fatigue.   Respiratory:  Positive for cough, shortness of breath and wheezing.    Cardiovascular:  Positive for leg swelling.   Skin:  Positive for color change (BLE red, chronic).   Neurological:  Positive for numbness (BLE).   All other systems reviewed and are negative.                 Personal History     Past Medical History:   Diagnosis Date    COPD (chronic obstructive pulmonary disease)     Hypertension          Past Surgical History:   Procedure Laterality Date    LAPAROSCOPIC TUBAL LIGATION      TOE SURGERY         Family History:   family history includes COPD in her mother; No Known Problems in her father.     Social History:  reports that she has been smoking cigarettes. She started smoking about 54 years ago. She has a 54.4 pack-year smoking history. She does not have any smokeless tobacco history on file. She reports that she does not use drugs.  Social History     Social History Narrative    , lives in [place], 2 kids retired  and        Medications:  Umeclidinium-Vilanterol, albuterol sulfate HFA, bisoprolol-hydrochlorothiazide, esomeprazole, gabapentin, sertraline, and traZODone    Allergies   Allergen Reactions    Fish Oil Rash       Objective   Objective     Vital Signs:   Temp:  [98.3 °F (36.8 °C)] 98.3 °F (36.8 °C)  Heart Rate:  [70-92] 75  Resp:  [24-29] 29  BP: (112-145)/(53-79) 131/66  Flow (L/min) (Oxygen Therapy):  [6] 6    Physical Exam  Vitals reviewed.   Constitutional:       General: She is not in acute distress.     Appearance: She is well-developed. She is ill-appearing. She is not toxic-appearing.   HENT:      Head: Normocephalic and atraumatic.      Nose: Nose normal.      Mouth/Throat:      Pharynx: Oropharynx is clear.   Eyes:      Extraocular Movements: Extraocular movements intact.      Conjunctiva/sclera: Conjunctivae normal.      Pupils: Pupils are equal, round, and reactive to light.   Cardiovascular:      Rate and Rhythm: Normal rate and regular rhythm.      Pulses: Normal pulses.      Heart sounds: Normal heart sounds. No murmur heard.  Pulmonary:      Effort: Respiratory distress (dyspneic w/ conversation / exertion) present.      Breath sounds: Wheezing present.      Comments: On 6 liters NC    Abdominal:       General: Bowel sounds are normal. There is no distension.      Palpations: Abdomen is soft.      Tenderness: There is no abdominal tenderness.   Musculoskeletal:         General: Normal range of motion.      Cervical back: Normal range of motion and neck supple.      Right lower le+ Edema present.      Left lower le+ Edema present.   Skin:     General: Skin is warm and dry.      Capillary Refill: Capillary refill takes 2 to 3 seconds.      Findings: Erythema (BLE w/ taut / dry / flakey skin) present.   Neurological:      Mental Status: She is alert and oriented to person, place, and time.      Cranial Nerves: No cranial nerve deficit.   Psychiatric:         Mood and Affect: Mood normal.         Behavior: Behavior normal.             Result Review:  I have personally reviewed the results from the time of this admission to 2025 23:50 EDT and agree with these findings:  [x]  Laboratory list / accordion  [x]  Microbiology  [x]  Radiology  [x]  EKG/Telemetry   []  Cardiology/Vascular   []  Pathology  []  Old records  []  Other:  Most notable findings include:      LAB RESULTS:      Lab 25   WBC 8.44   HEMOGLOBIN 17.4*   HEMATOCRIT 54.4*   PLATELETS 153   NEUTROS ABS 6.44   IMMATURE GRANS (ABS) 0.03   LYMPHS ABS 1.34   MONOS ABS 0.53   EOS ABS 0.07   .0*   PROCALCITONIN 0.07      D DIMER QUANT 0.60         Lab 25   SODIUM 137   POTASSIUM 4.6   CHLORIDE 94*   CO2 33.0*   ANION GAP 10.0   BUN 9.0   CREATININE 0.82   EGFR 77.1   GLUCOSE 133*   CALCIUM 9.4         Lab 25   TOTAL PROTEIN 6.6   ALBUMIN 3.9   GLOBULIN 2.7   ALT (SGPT) 12   AST (SGOT) 14   BILIRUBIN 0.4   ALK PHOS 100         Lab 255 25   PROBNP  --  1,097.0*   HSTROP T 31* 28*                 Lab 25   PH, ARTERIAL 7.382   PCO2, ARTERIAL 60.4*   PO2 .0*   FIO2 50   HCO3 ART 35.9*   BASE EXCESS ART 7.9*   CARBOXYHEMOGLOBIN 5.5*     Brief Urine Lab Results   (Last result in the past 365 days)        Color   Clarity   Blood   Leuk Est   Nitrite   Protein   CREAT   Urine HCG        10/14/24 1518 Yellow   Clear   Negative   Negative   Negative   Negative                 Microbiology Results (last 10 days)       Procedure Component Value - Date/Time    COVID PRE-OP / PRE-PROCEDURE SCREENING ORDER (NO ISOLATION) - Swab, Nasopharynx [[id number]]  (Normal) Collected: 06/07/25 2107    Lab Status: Final result Specimen: Swab from Nasopharynx Updated: 06/07/25 2137    Narrative:      The following orders were created for panel order COVID PRE-OP / PRE-PROCEDURE SCREENING ORDER (NO ISOLATION) - Swab, Nasopharynx.  Procedure                               Abnormality         Status                     ---------                               -----------         ------                     COVID-19 and FLU A/B PCR...[[id number]]  Normal              Final result                 Please view results for these tests on the individual orders.    COVID-19 and FLU A/B PCR, 1 HR TAT - Swab, Nasopharynx [[id number]]  (Normal) Collected: 06/07/25 2107    Lab Status: Final result Specimen: Swab from Nasopharynx Updated: 06/07/25 2137     COVID19 Not Detected     Influenza A PCR Not Detected     Influenza B PCR Not Detected    Narrative:      Fact sheet for providers: https://www.fda.gov/media/411569/download    Fact sheet for patients: https://www.fda.gov/media/147540/download    Test performed by PCR.            XR Chest 1 View  Result Date: 6/7/2025  XR CHEST 1 VW Date of Exam: 6/7/2025 9:03 PM EDT Indication: sob Comparison: 5/21/2025. Findings: Stable chronic interstitial disease and emphysema. No new lung opacity. No pneumothorax or pleural effusion. Heart size and pulmonary vasculature appear within normal limits. No acute osseous abnormalities.     Impression: Impression: Chronic interstitial disease and emphysema without superimposed acute process of the chest. Electronically Signed: Claude  MD Danay  6/7/2025 9:56 PM EDT  Workstation ID: [id number]      Results for orders placed during the hospital encounter of 08/30/24    Adult Transthoracic Echo Complete W/ Cont if Necessary Per Protocol    Interpretation Summary    Left ventricular ejection fraction appears to be greater than 70%.    Left ventricular wall thickness is consistent with mild posterior asymmetric hypertrophy.    The right ventricular cavity is mild to moderately dilated.    The left atrial cavity is mildly dilated.    Left atrial volume is mildly increased.    The right atrial cavity is moderately  dilated.    Estimated right ventricular systolic pressure from tricuspid regurgitation is normal (<35 mmHg).      Assessment & Plan   Assessment & Plan       COPD exacerbation    Nicotine dependence    Essential hypertension    Acute on chronic respiratory failure with hypoxia and hypercapnia    Peripheral polyneuropathy    Lower extremity venous stasis    Mood disorder    COPD exacerbation  A/C respiratory fx w/ hypoxia / hypercapnia  - baseline oxygen 4 liters NC, currently on 6 liters  - yupelri / brovana /pulmicort nebs scheduled  - duo nebs PRN  - methylprednisolone 62.5 mg IV daily  - unasyn / doxy  - pulmonary toilet / flutter valve / IS  - BIPAP prn  - wean oxygen as tolerated  - add mucinex  - hold azithromycin, taking daily prophylactic dose    Elevated BNP / Elevated troponin / HTN  - denies chest pain  - trend troponin   - EKG normal sinus rhythm  - continue bisoprolol / hctz  - ECHO 8/2024 w/ EF 70%    BLE venous stasis  - WOC evaluation  - lachydrin lotion  - has upcoming evaluation per daughter by a vein specialist    Nicotine dependence  - nicotine patch  - smoking cessation education         DVT prophylaxis:  Heparin SC TID    CODE STATUS:     Code Status (Patient has no pulse and is not breathing): CPR (Attempt to Resuscitate)  Medical Interventions (Patient has pulse or is breathing): Full Support      Expected Discharge      Expected Discharge Date: 6/9/2025; Expected Discharge Time:     Signature: Electronically signed by Maria Alejandra Roper, ELZA, 06/07/25, 11:49 PM EDT      Attending   Admission Attestation     I have performed an independent face-to-face diagnostic evaluation including performing an independent physical examination as documented here.  The documented plan of care above was reviewed and developed with the advanced practice clinician (APC).    Brief Summary Statement:   Elvi Keita is a 70 y.o. female who  has a past medical history of COPD (chronic obstructive pulmonary disease) and Hypertension.  that presented by ambulance is severe respiratory distress with pleuritic chest pain requiring bipap on arrival, now weaned to 6 LPM of oxygen. She reports only a few days of increasing oxygen requirement and shortness of breath.    Attending Physical Exam:  Temp:  [98.3 °F (36.8 °C)] 98.3 °F (36.8 °C)  Heart Rate:  [70-92] 71  Resp:  [24-29] 29  BP: (112-145)/(53-79) 115/62  Flow (L/min) (Oxygen Therapy):  [6] 6  Physical Exam      Constitutional: Awake, alert, interactive and pleasant, but miserable and short of breath at rest  Eyes: clear sclerae, no conjunctival injection  HENT: NCAT, mucous membranes moist  Neck: no masses or lymphadenopathy, trachea midline  Respiratory: poor air movement, inspiratory crackles, expiratory wheeze   Cardiovascular: RRR, no murmurs appreciated, palpable peripheral pulses  Abdomen:  soft, no HSM or masses palpable, not tender or distended  Musculoskeletal: No peripheral edema, clubbing or cyanosis  Neurologic: Oriented x 3,                       Strength symmetric in all extremities                     Cranial Nerves grossly intact, speech clear  Skin: No rashes or jaundice, chronic venous changes of her lower legs  Psychiatric: Appropriate mood, insight    Result Review:  I have personally reviewed the results from the time of this admission to 6/8/2025 00:23 EDT and agree with these  findings:  [x]  Laboratory list / accordion  [x]  Microbiology  [x]  Radiology  [x]  EKG/Telemetry   []  Cardiology/Vascular   []  Pathology  [x]  Old records  []  Other:  Most notable findings include: chronic cxr, labs reviewed.    Results      Assessment & Plan  A/CH Mixed respiratory failure,   --try HFNC  --probably needs trelegy at home  --cont steroids and nebs,  --doxy, tussionex    Anxiety  -cont zoloft and trazodone    Pleuritic chest pain  Slight troponin bump  --consider cardiac eval, EKG prn, try toradol check troponin in the am  -- last echo in 8.24 showed normal EF< dilated LA, LV, no pulmonary hypertension, no prior stress test  --abnormal troponin could be from hypoxia      HTN   -cont zebeta, hold HCTZ, gentle hydration    Neuropathy  -cont neurontin    Needs to have a good ACP plan/discussion with her daughter    Reyna Sanchez MD  06/08/25

## 2025-06-09 PROCEDURE — 94799 UNLISTED PULMONARY SVC/PX: CPT

## 2025-06-09 PROCEDURE — 25010000002 HEPARIN (PORCINE) PER 1000 UNITS: Performed by: NURSE PRACTITIONER

## 2025-06-09 PROCEDURE — 25010000002 KETOROLAC TROMETHAMINE PER 15 MG: Performed by: INTERNAL MEDICINE

## 2025-06-09 PROCEDURE — 63710000001 REVEFENACIN 175 MCG/3ML SOLUTION: Performed by: NURSE PRACTITIONER

## 2025-06-09 PROCEDURE — 25010000002 AMPICILLIN-SULBACTAM PER 1.5 G: Performed by: NURSE PRACTITIONER

## 2025-06-09 PROCEDURE — 99232 SBSQ HOSP IP/OBS MODERATE 35: CPT | Performed by: STUDENT IN AN ORGANIZED HEALTH CARE EDUCATION/TRAINING PROGRAM

## 2025-06-09 PROCEDURE — 25010000002 METHYLPREDNISOLONE PER 125 MG: Performed by: NURSE PRACTITIONER

## 2025-06-09 RX ORDER — HYDROXYZINE HYDROCHLORIDE 25 MG/1
25 TABLET, FILM COATED ORAL 3 TIMES DAILY PRN
Status: DISCONTINUED | OUTPATIENT
Start: 2025-06-09 | End: 2025-06-11 | Stop reason: HOSPADM

## 2025-06-09 RX ORDER — DOXYCYCLINE 100 MG/1
100 CAPSULE ORAL 2 TIMES DAILY
Status: DISCONTINUED | OUTPATIENT
Start: 2025-06-09 | End: 2025-06-11 | Stop reason: HOSPADM

## 2025-06-09 RX ADMIN — GABAPENTIN 800 MG: 400 CAPSULE ORAL at 14:41

## 2025-06-09 RX ADMIN — GABAPENTIN 800 MG: 400 CAPSULE ORAL at 06:31

## 2025-06-09 RX ADMIN — AMPICILLIN SODIUM AND SULBACTAM SODIUM 3000 MG: 2; 1 INJECTION, POWDER, FOR SOLUTION INTRAMUSCULAR; INTRAVENOUS at 21:23

## 2025-06-09 RX ADMIN — Medication: at 21:25

## 2025-06-09 RX ADMIN — KETOROLAC TROMETHAMINE 15 MG: 15 INJECTION, SOLUTION INTRAMUSCULAR; INTRAVENOUS at 06:31

## 2025-06-09 RX ADMIN — ARFORMOTEROL TARTRATE 15 MCG: 15 SOLUTION RESPIRATORY (INHALATION) at 19:58

## 2025-06-09 RX ADMIN — GUAIFENESIN 1200 MG: 600 TABLET, EXTENDED RELEASE ORAL at 09:35

## 2025-06-09 RX ADMIN — AMPICILLIN SODIUM AND SULBACTAM SODIUM 3000 MG: 2; 1 INJECTION, POWDER, FOR SOLUTION INTRAMUSCULAR; INTRAVENOUS at 02:04

## 2025-06-09 RX ADMIN — HEPARIN SODIUM 5000 UNITS: 5000 INJECTION INTRAVENOUS; SUBCUTANEOUS at 21:23

## 2025-06-09 RX ADMIN — HYDROXYZINE HYDROCHLORIDE 25 MG: 25 TABLET ORAL at 14:41

## 2025-06-09 RX ADMIN — DOXYCYCLINE 100 MG: 100 CAPSULE ORAL at 09:36

## 2025-06-09 RX ADMIN — DOCUSATE SODIUM 50 MG AND SENNOSIDES 8.6 MG 2 TABLET: 8.6; 5 TABLET, FILM COATED ORAL at 09:35

## 2025-06-09 RX ADMIN — AMPICILLIN SODIUM AND SULBACTAM SODIUM 3000 MG: 2; 1 INJECTION, POWDER, FOR SOLUTION INTRAMUSCULAR; INTRAVENOUS at 14:40

## 2025-06-09 RX ADMIN — BUDESONIDE 0.5 MG: 0.5 SUSPENSION RESPIRATORY (INHALATION) at 07:10

## 2025-06-09 RX ADMIN — GABAPENTIN 800 MG: 400 CAPSULE ORAL at 21:24

## 2025-06-09 RX ADMIN — METHYLPREDNISOLONE SODIUM SUCCINATE 62.5 MG: 125 INJECTION, POWDER, LYOPHILIZED, FOR SOLUTION INTRAMUSCULAR; INTRAVENOUS at 09:36

## 2025-06-09 RX ADMIN — HEPARIN SODIUM 5000 UNITS: 5000 INJECTION INTRAVENOUS; SUBCUTANEOUS at 14:40

## 2025-06-09 RX ADMIN — Medication 10 ML: at 09:35

## 2025-06-09 RX ADMIN — DOXYCYCLINE 100 MG: 100 CAPSULE ORAL at 21:23

## 2025-06-09 RX ADMIN — HYDROCHLOROTHIAZIDE 6.25 MG: 25 TABLET ORAL at 09:35

## 2025-06-09 RX ADMIN — HYDROCODONE POLISTIREX AND CHLORPHENIRAMINE POLISTIREX 5 ML: 10; 8 SUSPENSION, EXTENDED RELEASE ORAL at 23:46

## 2025-06-09 RX ADMIN — ARFORMOTEROL TARTRATE 15 MCG: 15 SOLUTION RESPIRATORY (INHALATION) at 07:10

## 2025-06-09 RX ADMIN — SERTRALINE HYDROCHLORIDE 50 MG: 50 TABLET ORAL at 09:35

## 2025-06-09 RX ADMIN — BISOPROLOL FUMARATE 10 MG: 5 TABLET, FILM COATED ORAL at 09:35

## 2025-06-09 RX ADMIN — KETOROLAC TROMETHAMINE 15 MG: 15 INJECTION, SOLUTION INTRAMUSCULAR; INTRAVENOUS at 12:04

## 2025-06-09 RX ADMIN — BUDESONIDE 0.5 MG: 0.5 SUSPENSION RESPIRATORY (INHALATION) at 19:58

## 2025-06-09 RX ADMIN — GUAIFENESIN 1200 MG: 600 TABLET, EXTENDED RELEASE ORAL at 21:23

## 2025-06-09 RX ADMIN — TRAZODONE HYDROCHLORIDE 100 MG: 100 TABLET ORAL at 21:29

## 2025-06-09 RX ADMIN — HYDROCODONE POLISTIREX AND CHLORPHENIRAMINE POLISTIREX 5 ML: 10; 8 SUSPENSION, EXTENDED RELEASE ORAL at 12:05

## 2025-06-09 RX ADMIN — PANTOPRAZOLE SODIUM 40 MG: 40 TABLET, DELAYED RELEASE ORAL at 06:31

## 2025-06-09 RX ADMIN — AMPICILLIN SODIUM AND SULBACTAM SODIUM 3000 MG: 2; 1 INJECTION, POWDER, FOR SOLUTION INTRAMUSCULAR; INTRAVENOUS at 09:34

## 2025-06-09 RX ADMIN — NICOTINE 1 PATCH: 21 PATCH TRANSDERMAL at 23:51

## 2025-06-09 RX ADMIN — Medication 10 ML: at 21:24

## 2025-06-09 RX ADMIN — REVEFENACIN 175 MCG: 175 SOLUTION RESPIRATORY (INHALATION) at 07:10

## 2025-06-09 RX ADMIN — HEPARIN SODIUM 5000 UNITS: 5000 INJECTION INTRAVENOUS; SUBCUTANEOUS at 06:31

## 2025-06-09 NOTE — PAYOR COMM NOTE
"Sarah Isbell RN  [facility]  Utilization Management  P:[phone]  F:[phone]    Ref # [id number]    Bryn Keita (70 y.o. Female)       Date of Birth   1955    Social Security Number       Address   [address] [place] [id number]    Home Phone   [phone]    MRN   [id number]       Faith   [facility]    Marital Status                               Admission Date   2025    Admission Type   Emergency    Admitting Provider   Chelly Sandhu MD    Attending Provider   Chelly Sandhu MD    Department, Room/Bed   [facility] 6A, N606/1       Discharge Date       Discharge Disposition       Discharge Destination                                 Attending Provider: Chelly Sandhu MD    Allergies: Fish Oil    Isolation: None   Infection: None   Code Status: CPR    Ht: 162.6 cm (64\")   Wt: 74.8 kg (164 lb 14.4 oz)    Admission Cmt: None   Principal Problem: COPD exacerbation [J44.1]                   Active Insurance as of 2025       Primary Coverage       Payor Plan Insurance Group Employer/Plan Group    ANTHEM MEDICARE REPLACEMENT ANTHEM MEDICARE ADVANTAGE PPO [id number]       Payor Plan Address Payor Plan Phone Number Payor Plan Fax Number Effective Dates    [address] [phone]  2024 - None Entered    [facility] [id number]         Subscriber Name Subscriber Birth Date Member ID       BRYN KEITA 1955 [id number]                     Emergency Contacts        (Rel.) Home Phone Work Phone Mobile Phone    AARON WYLIE (Daughter) -- -- [phone]    JESENIA KEITA (Spouse) [phone] -- --    BRYN WYLIE (Daughter) [phone] -- [phone]                 History & Physical        Reyna Sanchez MD at [address] [place] [address] Medicine Services  HISTORY AND PHYSICAL    Patient Name: Bryn Keita  : 1955  MRN: [id number]  Primary Care Physician: Provider, No Known  Date of admission: " 6/7/2025    Subjective  Subjective     Chief Complaint:  Shortness of breath    HPI:  Elvi Keita is a 70 y.o. female with PMHx of COPD on home oxygen 4 liters, tobacco use, HTN, HLD, neuropathy, mood disorder who presents to the ED for evaluation of shortness of breath. Pt reports increased shortness of breath over the past few days, took a nap today and woke up at 7pm with extreme shortness of breath and respiratory distress. Felt like she could not take a breath at all. Has been using her daily Anoro inhaler along with albuterol ~ 4 times a day without improvement. Was treated for COPD exacerbation with doxycycline and prednisone 5/21. She is taking daily prophylactic azithromycin. She does not see pulmonary outpatient, she is being managed by her PCP.    On arrival, she was placed on NRB mask then BIPAP 12/6, initially at 100% FiO2 but quickly weaned to 50% and now on 6 liters NC as she has difficulty tolerating the BIPAP. She had duo neb, solumedrol and doxycycline and reports improvement in her shortness of breath. Chest xray showing chronic interstitial disease and emphysema. Pertinent labs: proBNP 1097, HS trop 28 w/ repeat 31, normal d dimer / lactic acid / WBC; Hgb 17.4; COVID/Flu negative. ABG w/ pH 7.382, pCO2 60.4, pO2 126, HCO3 35.9 on BIPAP at 50%. She is afebrile and her vitals are stable. She will be admitted to hospital medicine for further management.     Review of Systems   Constitutional:  Positive for activity change and fatigue.   Respiratory:  Positive for cough, shortness of breath and wheezing.    Cardiovascular:  Positive for leg swelling.   Skin:  Positive for color change (BLE red, chronic).   Neurological:  Positive for numbness (BLE).   All other systems reviewed and are negative.                Personal History     Past Medical History:   Diagnosis Date    COPD (chronic obstructive pulmonary disease)     Hypertension          Past Surgical History:   Procedure Laterality Date     LAPAROSCOPIC TUBAL LIGATION      TOE SURGERY         Family History:   family history includes COPD in her mother; No Known Problems in her father.     Social History:  reports that she has been smoking cigarettes. She started smoking about 54 years ago. She has a 54.4 pack-year smoking history. She does not have any smokeless tobacco history on file. She reports that she does not use drugs.  Social History     Social History Narrative    , lives in [place], 2 kids retired  and        Medications:  Umeclidinium-Vilanterol, albuterol sulfate HFA, bisoprolol-hydrochlorothiazide, esomeprazole, gabapentin, sertraline, and traZODone    Allergies   Allergen Reactions    Fish Oil Rash       Objective  Objective     Vital Signs:   Temp:  [98.3 °F (36.8 °C)] 98.3 °F (36.8 °C)  Heart Rate:  [70-92] 75  Resp:  [24-29] 29  BP: (112-145)/(53-79) 131/66  Flow (L/min) (Oxygen Therapy):  [6] 6    Physical Exam  Vitals reviewed.   Constitutional:       General: She is not in acute distress.     Appearance: She is well-developed. She is ill-appearing. She is not toxic-appearing.   HENT:      Head: Normocephalic and atraumatic.      Nose: Nose normal.      Mouth/Throat:      Pharynx: Oropharynx is clear.   Eyes:      Extraocular Movements: Extraocular movements intact.      Conjunctiva/sclera: Conjunctivae normal.      Pupils: Pupils are equal, round, and reactive to light.   Cardiovascular:      Rate and Rhythm: Normal rate and regular rhythm.      Pulses: Normal pulses.      Heart sounds: Normal heart sounds. No murmur heard.  Pulmonary:      Effort: Respiratory distress (dyspneic w/ conversation / exertion) present.      Breath sounds: Wheezing present.      Comments: On 6 liters NC    Abdominal:      General: Bowel sounds are normal. There is no distension.      Palpations: Abdomen is soft.      Tenderness: There is no abdominal tenderness.   Musculoskeletal:         General: Normal range of  motion.      Cervical back: Normal range of motion and neck supple.      Right lower le+ Edema present.      Left lower le+ Edema present.   Skin:     General: Skin is warm and dry.      Capillary Refill: Capillary refill takes 2 to 3 seconds.      Findings: Erythema (BLE w/ taut / dry / flakey skin) present.   Neurological:      Mental Status: She is alert and oriented to person, place, and time.      Cranial Nerves: No cranial nerve deficit.   Psychiatric:         Mood and Affect: Mood normal.         Behavior: Behavior normal.             Result Review:  I have personally reviewed the results from the time of this admission to 2025 23:50 EDT and agree with these findings:  [x]  Laboratory list / accordion  [x]  Microbiology  [x]  Radiology  [x]  EKG/Telemetry   []  Cardiology/Vascular   []  Pathology  []  Old records  []  Other:  Most notable findings include:      LAB RESULTS:      Lab 25   WBC 8.44   HEMOGLOBIN 17.4*   HEMATOCRIT 54.4*   PLATELETS 153   NEUTROS ABS 6.44   IMMATURE GRANS (ABS) 0.03   LYMPHS ABS 1.34   MONOS ABS 0.53   EOS ABS 0.07   .0*   PROCALCITONIN 0.07      D DIMER QUANT 0.60         Lab 25   SODIUM 137   POTASSIUM 4.6   CHLORIDE 94*   CO2 33.0*   ANION GAP 10.0   BUN 9.0   CREATININE 0.82   EGFR 77.1   GLUCOSE 133*   CALCIUM 9.4         Lab 25   TOTAL PROTEIN 6.6   ALBUMIN 3.9   GLOBULIN 2.7   ALT (SGPT) 12   AST (SGOT) 14   BILIRUBIN 0.4   ALK PHOS 100         Lab 25  2225 25   PROBNP  --  1,097.0*   HSTROP T 31* 28*                 Lab 25   PH, ARTERIAL 7.382   PCO2, ARTERIAL 60.4*   PO2 .0*   FIO2 50   HCO3 ART 35.9*   BASE EXCESS ART 7.9*   CARBOXYHEMOGLOBIN 5.5*     Brief Urine Lab Results  (Last result in the past 365 days)        Color   Clarity   Blood   Leuk Est   Nitrite   Protein   CREAT   Urine HCG        10/14/24 1518 Yellow   Clear   Negative   Negative   Negative   Negative                  Microbiology Results (last 10 days)       Procedure Component Value - Date/Time    COVID PRE-OP / PRE-PROCEDURE SCREENING ORDER (NO ISOLATION) - Swab, Nasopharynx [[id number]]  (Normal) Collected: 06/07/25 2107    Lab Status: Final result Specimen: Swab from Nasopharynx Updated: 06/07/25 2137    Narrative:      The following orders were created for panel order COVID PRE-OP / PRE-PROCEDURE SCREENING ORDER (NO ISOLATION) - Swab, Nasopharynx.  Procedure                               Abnormality         Status                     ---------                               -----------         ------                     COVID-19 and FLU A/B PCR...[[id number]]  Normal              Final result                 Please view results for these tests on the individual orders.    COVID-19 and FLU A/B PCR, 1 HR TAT - Swab, Nasopharynx [[id number]]  (Normal) Collected: 06/07/25 2107    Lab Status: Final result Specimen: Swab from Nasopharynx Updated: 06/07/25 2137     COVID19 Not Detected     Influenza A PCR Not Detected     Influenza B PCR Not Detected    Narrative:      Fact sheet for providers: https://www.fda.gov/media/246578/download    Fact sheet for patients: https://www.fda.gov/media/109443/download    Test performed by PCR.            XR Chest 1 View  Result Date: 6/7/2025  XR CHEST 1 VW Date of Exam: 6/7/2025 9:03 PM EDT Indication: sob Comparison: 5/21/2025. Findings: Stable chronic interstitial disease and emphysema. No new lung opacity. No pneumothorax or pleural effusion. Heart size and pulmonary vasculature appear within normal limits. No acute osseous abnormalities.     Impression: Impression: Chronic interstitial disease and emphysema without superimposed acute process of the chest. Electronically Signed: Claude Jon MD  6/7/2025 9:56 PM EDT  Workstation ID: [id number]      Results for orders placed during the hospital encounter of 08/30/24    Adult Transthoracic Echo Complete W/ Cont if Necessary Per  Protocol    Interpretation Summary    Left ventricular ejection fraction appears to be greater than 70%.    Left ventricular wall thickness is consistent with mild posterior asymmetric hypertrophy.    The right ventricular cavity is mild to moderately dilated.    The left atrial cavity is mildly dilated.    Left atrial volume is mildly increased.    The right atrial cavity is moderately  dilated.    Estimated right ventricular systolic pressure from tricuspid regurgitation is normal (<35 mmHg).      Assessment & Plan  Assessment & Plan       COPD exacerbation    Nicotine dependence    Essential hypertension    Acute on chronic respiratory failure with hypoxia and hypercapnia    Peripheral polyneuropathy    Lower extremity venous stasis    Mood disorder    COPD exacerbation  A/C respiratory fx w/ hypoxia / hypercapnia  - baseline oxygen 4 liters NC, currently on 6 liters  - yupelri / brovana /pulmicort nebs scheduled  - duo nebs PRN  - methylprednisolone 62.5 mg IV daily  - unasyn / doxy  - pulmonary toilet / flutter valve / IS  - BIPAP prn  - wean oxygen as tolerated  - add mucinex  - hold azithromycin, taking daily prophylactic dose    Elevated BNP / Elevated troponin / HTN  - denies chest pain  - trend troponin   - EKG normal sinus rhythm  - continue bisoprolol / hctz  - ECHO 8/2024 w/ EF 70%    BLE venous stasis  - WOC evaluation  - lachydrin lotion  - has upcoming evaluation per daughter by a vein specialist    Nicotine dependence  - nicotine patch  - smoking cessation education         DVT prophylaxis:  Heparin SC TID    CODE STATUS:     Code Status (Patient has no pulse and is not breathing): CPR (Attempt to Resuscitate)  Medical Interventions (Patient has pulse or is breathing): Full Support      Expected Discharge     Expected Discharge Date: 6/9/2025; Expected Discharge Time:     Signature: Electronically signed by ELZA Menon, 06/07/25, 11:49 PM EDT      Attending   Admission Attestation     I have  performed an independent face-to-face diagnostic evaluation including performing an independent physical examination as documented here.  The documented plan of care above was reviewed and developed with the advanced practice clinician (APC).    Brief Summary Statement:   Elvi Keita is a 70 y.o. female who  has a past medical history of COPD (chronic obstructive pulmonary disease) and Hypertension.  that presented by ambulance is severe respiratory distress with pleuritic chest pain requiring bipap on arrival, now weaned to 6 LPM of oxygen. She reports only a few days of increasing oxygen requirement and shortness of breath.    Attending Physical Exam:  Temp:  [98.3 °F (36.8 °C)] 98.3 °F (36.8 °C)  Heart Rate:  [70-92] 71  Resp:  [24-29] 29  BP: (112-145)/(53-79) 115/62  Flow (L/min) (Oxygen Therapy):  [6] 6  Physical Exam      Constitutional: Awake, alert, interactive and pleasant, but miserable and short of breath at rest  Eyes: clear sclerae, no conjunctival injection  HENT: NCAT, mucous membranes moist  Neck: no masses or lymphadenopathy, trachea midline  Respiratory: poor air movement, inspiratory crackles, expiratory wheeze   Cardiovascular: RRR, no murmurs appreciated, palpable peripheral pulses  Abdomen:  soft, no HSM or masses palpable, not tender or distended  Musculoskeletal: No peripheral edema, clubbing or cyanosis  Neurologic: Oriented x 3,                       Strength symmetric in all extremities                     Cranial Nerves grossly intact, speech clear  Skin: No rashes or jaundice, chronic venous changes of her lower legs  Psychiatric: Appropriate mood, insight    Result Review:  I have personally reviewed the results from the time of this admission to 6/8/2025 00:23 EDT and agree with these findings:  [x]  Laboratory list / accordion  [x]  Microbiology  [x]  Radiology  [x]  EKG/Telemetry   []  Cardiology/Vascular   []  Pathology  [x]  Old records  []  Other:  Most notable findings include:  chronic cxr, labs reviewed.    Results      Assessment & Plan  A/CH Mixed respiratory failure,   --try HFNC  --probably needs trelegy at home  --cont steroids and nebs,  --doxy, tussionex    Anxiety  -cont zoloft and trazodone    Pleuritic chest pain  Slight troponin bump  --consider cardiac eval, EKG prn, try toradol check troponin in the am  -- last echo in 8.24 showed normal EF< dilated LA, LV, no pulmonary hypertension, no prior stress test  --abnormal troponin could be from hypoxia      HTN   -cont zebeta, hold HCTZ, gentle hydration    Neuropathy  -cont neurontin    Needs to have a good ACP plan/discussion with her daughter    Reyna Sanchez MD  06/08/25                      Electronically signed by Reyna Sanchez MD at 06/08/25 0031          Emergency Department Notes        Alice Good, RN at 06/07/25 224           Elvi Valenciaughn    Nursing Report ED to Floor:  Mental status: a/ox4  Ambulatory status: nonambulatory in ed  Oxygen Therapy:  6Ls NC  Cardiac Rhythm: nsr  Admitted from: home  Safety Concerns:  none  Precautions: none  Social Issues: family at bedside  ED Room #:  20    ED Nurse Phone Extension - [phone] or may call [phone].      HPI:   Chief Complaint   Patient presents with    Shortness of Breath       Past Medical History:  Past Medical History:   Diagnosis Date    COPD (chronic obstructive pulmonary disease)     Hypertension         Past Surgical History:  Past Surgical History:   Procedure Laterality Date    LAPAROSCOPIC TUBAL LIGATION      TOE SURGERY          Admitting Doctor:   Reyna Sanchez MD    Consulting Provider(s):  Consults       No orders found from 5/9/2025 to 6/8/2025.             Admitting Diagnosis:   The primary encounter diagnosis was Acute exacerbation of chronic obstructive pulmonary disease (COPD). Diagnoses of Acute on chronic respiratory failure with hypoxia and hypercapnia, Smoker, and History of hypertension were also pertinent to this visit.    Most Recent Vitals:    Vitals:    06/07/25 2200 06/07/25 2224 06/07/25 2229 06/07/25 2230   BP: 128/55   145/60   Pulse: 72 92 81 78   Resp:       Temp:       TempSrc:       SpO2: 95% (!) 80% 91% 91%   Weight:       Height:           Active LDAs/IV Access:   Lines, Drains & Airways       Active LDAs       Name Placement date Placement time Site Days    Peripheral IV 06/07/25 2115 20 G Left Antecubital 06/07/25 2115  Antecubital  less than 1                    Labs (abnormal labs have a star):   Labs Reviewed   COMPREHENSIVE METABOLIC PANEL - Abnormal; Notable for the following components:       Result Value    Glucose 133 (*)     Chloride 94 (*)     CO2 33.0 (*)     All other components within normal limits    Narrative:     GFR Categories in Chronic Kidney Disease (CKD)              GFR Category          GFR (mL/min/1.73)    Interpretation  G1                    90 or greater        Normal or high (1)  G2                    60-89                Mild decrease (1)  G3a                   45-59                Mild to moderate decrease  G3b                   30-44                Moderate to severe decrease  G4                    15-29                Severe decrease  G5                    14 or less           Kidney failure    (1)In the absence of evidence of kidney disease, neither GFR category G1 or G2 fulfill the criteria for CKD.    eGFR calculation 2021 CKD-EPI creatinine equation, which does not include race as a factor   BNP (IN-HOUSE) - Abnormal; Notable for the following components:    proBNP 1,097.0 (*)     All other components within normal limits    Narrative:     This assay is used as an aid in the diagnosis of individuals suspected of having heart failure. It can be used as an aid in the diagnosis of acute decompensated heart failure (ADHF) in patients presenting with signs and symptoms of ADHF to the emergency department (ED). In addition, NT-proBNP of <300 pg/mL indicates ADHF is not likely.    Age Range Result  Interpretation  NT-proBNP Concentration (pg/mL:      <50             Positive            >450                   Gray                 300-450                    Negative             <300    50-75           Positive            >900                  Gray                300-900                  Negative            <300      >75             Positive            >1800                  Gray                300-1800                  Negative            <300   TROPONIN - Abnormal; Notable for the following components:    HS Troponin T 28 (*)     All other components within normal limits    Narrative:     High Sensitive Troponin T Reference Range:  <14.0 ng/L- Negative Female for AMI  <22.0 ng/L- Negative Male for AMI  >=14 - Abnormal Female indicating possible myocardial injury.  >=22 - Abnormal Male indicating possible myocardial injury.   Clinicians would have to utilize clinical acumen, EKG, Troponin, and serial changes to determine if it is an Acute Myocardial Infarction or myocardial injury due to an underlying chronic condition.        CBC WITH AUTO DIFFERENTIAL - Abnormal; Notable for the following components:    RBC 5.44 (*)     Hemoglobin 17.4 (*)     Hematocrit 54.4 (*)     .0 (*)     Neutrophil % 76.2 (*)     Lymphocyte % 15.9 (*)     All other components within normal limits   BLOOD GAS, ARTERIAL W/CO-OXIMETRY - Abnormal; Notable for the following components:    pCO2, Arterial 60.4 (*)     pO2, Arterial 126.0 (*)     HCO3, Arterial 35.9 (*)     Base Excess, Arterial 7.9 (*)     Hematocrit, Blood Gas 54.0 (*)     Oxyhemoglobin 93.8 (*)     Carboxyhemoglobin 5.5 (*)     CO2 Content 37.7 (*)     pCO2, Temperature Corrected 60.4 (*)     pO2, Temperature Corrected 126 (*)     All other components within normal limits   COVID-19 AND FLU A/B, NP SWAB IN TRANSPORT MEDIA 1 HR TAT - Normal    Narrative:     Fact sheet for providers: https://www.fda.gov/media/187023/download    Fact sheet for patients:  "https://www.fda.gov/media/403456/download    Test performed by PCR.   D-DIMER, QUANTITATIVE - Normal    Narrative:     According to the assay 's published package insert, a normal (<0.50 MCGFEU/mL) D-dimer result in conjunction with a non-high clinical probability assessment, excludes deep vein thrombosis (DVT) and pulmonary embolism (PE) with high sensitivity.    D-dimer values increase with age and this can make VTE exclusion of an older population difficult. To address this, the American College of Physicians, based on best available evidence and recent guidelines, recommends that clinicians use age-adjusted D-dimer thresholds in patients greater than 50 years of age with: a) a low probability of PE who do not meet all Pulmonary Embolism Rule Out Criteria, or b) in those with intermediate probability of PE.   The formula for an age-adjusted D-dimer cut-off is \"age/100\".  For example, a 60 year old patient would have an age-adjusted cut-off of 0.60 MCGFEU/mL and an 80 year old 0.80 MCGFEU/mL.   COVID PRE-OP / PRE-PROCEDURE SCREENING ORDER (NO ISOLATION)    Narrative:     The following orders were created for panel order COVID PRE-OP / PRE-PROCEDURE SCREENING ORDER (NO ISOLATION) - Swab, Nasopharynx.  Procedure                               Abnormality         Status                     ---------                               -----------         ------                     COVID-19 and FLU A/B PCR...[[id number]]  Normal              Final result                 Please view results for these tests on the individual orders.   BLOOD GAS, ARTERIAL   HIGH SENSITIVITIY TROPONIN T 1HR   PROCALCITONIN   LACTATE DEHYDROGENASE   CBC AND DIFFERENTIAL    Narrative:     The following orders were created for panel order CBC & Differential.  Procedure                               Abnormality         Status                     ---------                               -----------         ------                     CBC " Auto Differential[[id number]]        Abnormal            Final result                 Please view results for these tests on the individual orders.       Meds Given in ED:   Medications   midazolam (VERSED) injection 1 mg (1 mg Intravenous Given 6/7/25 2116)   doxycycline (MONODOX) capsule 100 mg (100 mg Oral Given 6/7/25 2116)   methylPREDNISolone sodium succinate (SOLU-Medrol) injection 125 mg (125 mg Intravenous Given 6/7/25 2116)   ipratropium-albuterol (DUO-NEB) nebulizer solution 3 mL (3 mL Nebulization Given 6/7/25 2105)     No current facility-administered medications for this encounter.       Last NIH score:                                                          Dysphagia screening results:        [place] Coma Scale:  No data recorded     CIWA:        Restraint Type:            Isolation Status:  No active isolations          Electronically signed by Alice Good RN at 06/07/25 2306       Vital Signs (last 3 days)       Date/Time Temp Temp src Pulse Resp BP Patient Position SpO2    06/09/25 1159 97.6 (36.4) Oral 65 20 177/72 Lying 91    06/09/25 0936 -- -- -- -- 148/60 -- --    06/09/25 0741 97.6 (36.4) Oral 62 20 169/70 Sitting 95    06/09/25 0716 -- -- 63 -- -- -- 97    06/09/25 0715 -- -- 64 -- -- -- 97    06/09/25 0714 -- -- 64 18 -- Lying 96    06/09/25 0537 -- -- -- -- 141/59 Lying --    06/09/25 0415 97.8 (36.6) Oral 75 20 182/70 Lying 91    06/08/25 2300 98 (36.7) Oral 68 17 137/52 Lying 93    06/08/25 2003 98.3 (36.8) Oral 77 18 142/60 Lying 91    06/08/25 1845 -- -- 78 18 -- -- 94    06/08/25 1800 -- -- 72 -- -- -- 92    06/08/25 1600 97 (36.1) Oral 70 18 157/64 Lying 95    06/08/25 1400 -- -- 81 -- -- -- 93    06/08/25 1200 -- -- 67 -- -- -- 95    06/08/25 1100 97.3 (36.3) Oral 72 22 154/62 Lying 95    06/08/25 1000 -- -- 65 -- -- -- 94    06/08/25 0825 97.8 (36.6) Oral 70 22 155/63 Sitting 90    06/08/25 0804 -- -- 71 24 -- -- 87    06/08/25 0800 -- -- 74 -- -- -- 91    06/08/25 0142 98  (36.7) Oral 72 24 148/65 Lying 91    06/08/25 0100 -- -- 85 -- 165/65 -- 88    06/08/25 0030 -- -- 79 -- 147/63 -- 90    06/08/25 0000 -- -- 71 -- 115/62 -- 94    06/07/25 2330 -- -- 75 -- 131/66 -- 93    06/07/25 2300 -- -- 70 -- 136/53 -- 93    06/07/25 2245 -- -- 81 -- -- -- 93    06/07/25 2230 -- -- 78 -- 145/60 -- 91    06/07/25 2229 -- -- 81 -- -- -- 91    06/07/25 2224 -- -- 92 -- -- -- 80    06/07/25 2200 -- -- 72 -- 128/55 -- 95    06/07/25 2145 -- -- 72 -- -- -- 95    06/07/25 2130 -- -- 76 -- 112/54 -- 94    06/07/25 2105 -- -- 77 29 -- -- 98    06/07/25 2100 -- -- 82 -- 117/57 -- 99    06/07/25 20:54:59 -- -- -- -- 144/79 -- --    06/07/25 2054 98.3 (36.8) Axillary -- -- -- -- --    06/07/25 2053 -- -- 82 -- -- -- --    06/07/25 2051 -- -- 88 24 -- -- 91          Current Facility-Administered Medications   Medication Dose Route Frequency Provider Last Rate Last Admin    acetaminophen (TYLENOL) tablet 650 mg  650 mg Oral Q4H PRN Maria Alejandra Roper APRN        Or    acetaminophen (TYLENOL) 160 MG/5ML oral solution 650 mg  650 mg Oral Q4H PRN Maria Alejandra Roper APRN        Or    acetaminophen (TYLENOL) suppository 650 mg  650 mg Rectal Q4H PRN Maria Alejandra Roper APRN        ammonium lactate (AMLACTIN) 12 % cream   Topical BID PRN Maria Alejandra Roper APRN        ampicillin-sulbactam (UNASYN) 3,000 mg in sodium chloride 0.9 % 100 mL MBP  3,000 mg Intravenous Q6H Maria Alejandra Roper APRN   3,000 mg at 06/09/25 0934    arformoterol (BROVANA) nebulizer solution 15 mcg  15 mcg Nebulization BID - RT Maria Alejandra Roper APRN   15 mcg at 06/09/25 0710    And    budesonide (PULMICORT) nebulizer solution 0.5 mg  0.5 mg Nebulization BID - RT Maria Alejandra Roper APRN   0.5 mg at 06/09/25 0710    And    revefenacin (YUPELRI) nebulizer solution 175 mcg  175 mcg Nebulization Daily - RT Maria Alejandra Roper APRN   175 mcg at 06/09/25 0710    sennosides-docusate (PERICOLACE) 8.6-50 MG per tablet 2 tablet  2 tablet Oral BID PRN Maria Alejandra Roper APRN   2  tablet at 06/09/25 0935    And    polyethylene glycol (MIRALAX) packet 17 g  17 g Oral Daily PRN Maria Alejandra Roper APRN        And    bisacodyl (DULCOLAX) EC tablet 5 mg  5 mg Oral Daily PRN Maria Alejandra Roper APRN        And    bisacodyl (DULCOLAX) suppository 10 mg  10 mg Rectal Daily PRN Maria Alejandra Roper APRN        bisoprolol (ZEBeta) tablet 10 mg  10 mg Oral Daily Maria Alejandra Roper APRN   10 mg at 06/09/25 0935    And    hydroCHLOROthiazide tablet 6.25 mg  6.25 mg Oral Daily Maria Alejandra Roper APRN   6.25 mg at 06/09/25 0935    doxycycline (MONODOX) capsule 100 mg  100 mg Oral BID Maria Alejandra Roper APRN   100 mg at 06/09/25 0936    gabapentin (NEURONTIN) capsule 800 mg  800 mg Oral Q8H Maria Alejandra Roper APRN   800 mg at 06/09/25 0631    guaiFENesin (MUCINEX) 12 hr tablet 1,200 mg  1,200 mg Oral Q12H Maria Alejandra Roper APRN   1,200 mg at 06/09/25 0935    heparin (porcine) 5000 UNIT/ML injection 5,000 Units  5,000 Units Subcutaneous Q8H Maria Alejandra Roper APRN   5,000 Units at 06/09/25 0631    Hydrocod Yanick-Chlorphe Yanick ER (TUSSIONEX PENNKINETIC) 10-8 MG/5ML ER suspension 5 mL  5 mL Oral Q12H Reyna Sanchez MD   5 mL at 06/09/25 1205    ipratropium-albuterol (DUO-NEB) nebulizer solution 3 mL  3 mL Nebulization Q4H PRN Maria Alejandra Roper APRN        methylPREDNISolone sodium succinate (SOLU-Medrol) injection 62.5 mg  62.5 mg Intravenous Daily Maria Alejandra Roper APRN   62.5 mg at 06/09/25 0936    nicotine (NICODERM CQ) 21 MG/24HR patch 1 patch  1 patch Transdermal Q24H Maria Alejandra Roper APRN   1 patch at 06/08/25 2346    nicotine polacrilex (NICORETTE) gum 2 mg  2 mg Mouth/Throat Q1H PRN Maria Alejandra Roper APRN        nitroglycerin (NITROSTAT) SL tablet 0.4 mg  0.4 mg Sublingual Q5 Min PRN Maria Alejandra Roper APRN        ondansetron ODT (ZOFRAN-ODT) disintegrating tablet 4 mg  4 mg Oral Q6H PRN Maria Alejandra Roper APRN        Or    ondansetron (ZOFRAN) injection 4 mg  4 mg Intravenous Q6H PRN Maria Alejandra Roper APRN        pantoprazole (PROTONIX) EC  tablet 40 mg  40 mg Oral Q AM Maria Alejandra Roper APRN   40 mg at 06/09/25 0631    sertraline (ZOLOFT) tablet 50 mg  50 mg Oral Daily Maria Alejandra Roper APRN   50 mg at 06/09/25 0935    sodium chloride 0.9 % flush 10 mL  10 mL Intravenous Q12H Maria Alejandra Roper, APRN   10 mL at 06/09/25 0935    sodium chloride 0.9 % flush 10 mL  10 mL Intravenous PRN Maria Alejandra Roper, APRN        sodium chloride 0.9 % infusion 40 mL  40 mL Intravenous PRN Maria Alejandra Roper, APRN        traZODone (DESYREL) tablet 100 mg  100 mg Oral Nightly Maria Alejandra Roper, APRN   100 mg at 06/08/25 2218     Lab Results (all)       Procedure Component Value Units Date/Time    High Sensitivity Troponin T [[id number]]  (Abnormal) Collected: 06/08/25 0343    Specimen: Blood Updated: 06/08/25 0522     HS Troponin T 27 ng/L     Narrative:      High Sensitive Troponin T Reference Range:  <14.0 ng/L- Negative Female for AMI  <22.0 ng/L- Negative Male for AMI  >=14 - Abnormal Female indicating possible myocardial injury.  >=22 - Abnormal Male indicating possible myocardial injury.   Clinicians would have to utilize clinical acumen, EKG, Troponin, and serial changes to determine if it is an Acute Myocardial Infarction or myocardial injury due to an underlying chronic condition.         Basic Metabolic Panel [[id number]]  (Abnormal) Collected: 06/08/25 0343    Specimen: Blood Updated: 06/08/25 0522     Glucose 155 mg/dL      BUN 10.6 mg/dL      Creatinine 0.76 mg/dL      Sodium 135 mmol/L      Potassium 4.2 mmol/L      Chloride 94 mmol/L      CO2 32.0 mmol/L      Calcium 8.7 mg/dL      BUN/Creatinine Ratio 13.9     Anion Gap 9.0 mmol/L      eGFR 84.4 mL/min/1.73     Narrative:      GFR Categories in Chronic Kidney Disease (CKD)              GFR Category          GFR (mL/min/1.73)    Interpretation  G1                    90 or greater        Normal or high (1)  G2                    60-89                Mild decrease (1)  G3a                   45-59                Mild to  moderate decrease  G3b                   30-44                Moderate to severe decrease  G4                    15-29                Severe decrease  G5                    14 or less           Kidney failure    (1)In the absence of evidence of kidney disease, neither GFR category G1 or G2 fulfill the criteria for CKD.    eGFR calculation 2021 CKD-EPI creatinine equation, which does not include race as a factor    Magnesium [[id number]]  (Abnormal) Collected: 06/08/25 0343    Specimen: Blood Updated: 06/08/25 0522     Magnesium 1.5 mg/dL     CBC Auto Differential [[id number]]  (Abnormal) Collected: 06/08/25 0343    Specimen: Blood Updated: 06/08/25 0445     WBC 6.72 10*3/mm3      RBC 5.01 10*6/mm3      Hemoglobin 15.9 g/dL      Hematocrit 50.1 %      .0 fL      MCH 31.7 pg      MCHC 31.7 g/dL      RDW 12.7 %      RDW-SD 47.2 fl      MPV 10.2 fL      Platelets 146 10*3/mm3      Neutrophil % 92.9 %      Lymphocyte % 6.3 %      Monocyte % 0.4 %      Eosinophil % 0.0 %      Basophil % 0.1 %      Immature Grans % 0.3 %      Neutrophils, Absolute 6.24 10*3/mm3      Lymphocytes, Absolute 0.42 10*3/mm3      Monocytes, Absolute 0.03 10*3/mm3      Eosinophils, Absolute 0.00 10*3/mm3      Basophils, Absolute 0.01 10*3/mm3      Immature Grans, Absolute 0.02 10*3/mm3      nRBC 0.0 /100 WBC     Procalcitonin [[id number]]  (Normal) Collected: 06/07/25 2105    Specimen: Blood Updated: 06/07/25 2307     Procalcitonin 0.07 ng/mL     Narrative:      As a Marker for Sepsis (Non-Neonates):    1. <0.5 ng/mL represents a low risk of severe sepsis and/or septic shock.  2. >2 ng/mL represents a high risk of severe sepsis and/or septic shock.    As a Marker for Lower Respiratory Tract Infections that require antibiotic therapy:    PCT on Admission    Antibiotic Therapy       6-12 Hrs later    >0.5                Strongly Recommended  >0.25 - <0.5        Recommended   0.1 - 0.25          Discouraged              Remeasure/reassess  "PCT  <0.1                Strongly Discouraged     Remeasure/reassess PCT    As 28 day mortality risk marker: \"Change in Procalcitonin Result\" (>80% or <=80%) if Day 0 (or Day 1) and Day 4 values are available. Refer to http://www.[facility]-pct-calculator.com    Change in PCT <=80%  A decrease of PCT levels below or equal to 80% defines a positive change in PCT test result representing a higher risk for 28-day all-cause mortality of patients diagnosed with severe sepsis for septic shock.    Change in PCT >80%  A decrease of PCT levels of more than 80% defines a negative change in PCT result representing a lower risk for 28-day all-cause mortality of patients diagnosed with severe sepsis or septic shock.       High Sensitivity Troponin T 1Hr [[id number]]  (Abnormal) Collected: 06/07/25 2225    Specimen: Blood from Arm, Left Updated: 06/07/25 2305     HS Troponin T 31 ng/L      Troponin T Numeric Delta 3 ng/L      Troponin T % Delta 11    Narrative:      High Sensitive Troponin T Reference Range:  <14.0 ng/L- Negative Female for AMI  <22.0 ng/L- Negative Male for AMI  >=14 - Abnormal Female indicating possible myocardial injury.  >=22 - Abnormal Male indicating possible myocardial injury.   Clinicians would have to utilize clinical acumen, EKG, Troponin, and serial changes to determine if it is an Acute Myocardial Infarction or myocardial injury due to an underlying chronic condition.         Lactate Dehydrogenase [[id number]]  (Normal) Collected: 06/07/25 2105    Specimen: Blood Updated: 06/07/25 2302      U/L      Comment: Specimen hemolyzed.  Results may be affected.       D-dimer, Quantitative [[id number]]  (Normal) Collected: 06/07/25 2105    Specimen: Blood Updated: 06/07/25 2202     D-Dimer, Quantitative 0.60 MCGFEU/mL     Narrative:      According to the assay 's published package insert, a normal (<0.50 MCGFEU/mL) D-dimer result in conjunction with a non-high clinical probability assessment, " "excludes deep vein thrombosis (DVT) and pulmonary embolism (PE) with high sensitivity.    D-dimer values increase with age and this can make VTE exclusion of an older population difficult. To address this, the [employer], based on best available evidence and recent guidelines, recommends that clinicians use age-adjusted D-dimer thresholds in patients greater than 50 years of age with: a) a low probability of PE who do not meet all Pulmonary Embolism Rule Out Criteria, or b) in those with intermediate probability of PE.   The formula for an age-adjusted D-dimer cut-off is \"age/100\".  For example, a 60 year old patient would have an age-adjusted cut-off of 0.60 MCGFEU/mL and an 80 year old 0.80 MCGFEU/mL.    Comprehensive Metabolic Panel [[id number]]  (Abnormal) Collected: 06/07/25 2105    Specimen: Blood Updated: 06/07/25 2142     Glucose 133 mg/dL      BUN 9.0 mg/dL      Creatinine 0.82 mg/dL      Sodium 137 mmol/L      Potassium 4.6 mmol/L      Comment: Slight hemolysis detected by analyzer. Result may be falsely elevated.        Chloride 94 mmol/L      CO2 33.0 mmol/L      Calcium 9.4 mg/dL      Total Protein 6.6 g/dL      Albumin 3.9 g/dL      ALT (SGPT) 12 U/L      AST (SGOT) 14 U/L      Alkaline Phosphatase 100 U/L      Total Bilirubin 0.4 mg/dL      Globulin 2.7 gm/dL      Comment: Calculated Result        A/G Ratio 1.4 g/dL      BUN/Creatinine Ratio 11.0     Anion Gap 10.0 mmol/L      eGFR 77.1 mL/min/1.73     Narrative:      GFR Categories in Chronic Kidney Disease (CKD)              GFR Category          GFR (mL/min/1.73)    Interpretation  G1                    90 or greater        Normal or high (1)  G2                    60-89                Mild decrease (1)  G3a                   45-59                Mild to moderate decrease  G3b                   30-44                Moderate to severe decrease  G4                    15-29                Severe decrease  G5                    14 or " less           Kidney failure    (1)In the absence of evidence of kidney disease, neither GFR category G1 or G2 fulfill the criteria for CKD.    eGFR calculation 2021 CKD-EPI creatinine equation, which does not include race as a factor    BNP [[id number]]  (Abnormal) Collected: 06/07/25 2105    Specimen: Blood Updated: 06/07/25 2142     proBNP 1,097.0 pg/mL     Narrative:      This assay is used as an aid in the diagnosis of individuals suspected of having heart failure. It can be used as an aid in the diagnosis of acute decompensated heart failure (ADHF) in patients presenting with signs and symptoms of ADHF to the emergency department (ED). In addition, NT-proBNP of <300 pg/mL indicates ADHF is not likely.    Age Range Result Interpretation  NT-proBNP Concentration (pg/mL:      <50             Positive            >450                   Gray                 300-450                    Negative             <300    50-75           Positive            >900                  Gray                300-900                  Negative            <300      >75             Positive            >1800                  Gray                300-1800                  Negative            <300    High Sensitivity Troponin T [[id number]]  (Abnormal) Collected: 06/07/25 2105    Specimen: Blood Updated: 06/07/25 2142     HS Troponin T 28 ng/L     Narrative:      High Sensitive Troponin T Reference Range:  <14.0 ng/L- Negative Female for AMI  <22.0 ng/L- Negative Male for AMI  >=14 - Abnormal Female indicating possible myocardial injury.  >=22 - Abnormal Male indicating possible myocardial injury.   Clinicians would have to utilize clinical acumen, EKG, Troponin, and serial changes to determine if it is an Acute Myocardial Infarction or myocardial injury due to an underlying chronic condition.         COVID PRE-OP / PRE-PROCEDURE SCREENING ORDER (NO ISOLATION) - Swab, Nasopharynx [[id number]]  (Normal) Collected: 06/07/25 2107    Specimen: Swab  from Nasopharynx Updated: 06/07/25 2137    Narrative:      The following orders were created for panel order COVID PRE-OP / PRE-PROCEDURE SCREENING ORDER (NO ISOLATION) - Swab, Nasopharynx.  Procedure                               Abnormality         Status                     ---------                               -----------         ------                     COVID-19 and FLU A/B PCR...[[id number]]  Normal              Final result                 Please view results for these tests on the individual orders.    COVID-19 and FLU A/B PCR, 1 HR TAT - Swab, Nasopharynx [[id number]]  (Normal) Collected: 06/07/25 2107    Specimen: Swab from Nasopharynx Updated: 06/07/25 2137     COVID19 Not Detected     Influenza A PCR Not Detected     Influenza B PCR Not Detected    Narrative:      Fact sheet for providers: https://www.fda.gov/media/631668/download    Fact sheet for patients: https://www.fda.gov/media/467596/download    Test performed by PCR.    Blood Gas, Arterial With Co-Ox [[id number]]  (Abnormal) Collected: 06/07/25 2127    Specimen: Arterial Blood Updated: 06/07/25 2128     Site Right Radial     Santiago's Test Positive     pH, Arterial 7.382 pH units      pCO2, Arterial 60.4 mm Hg      Comment: 83 Value above reference range        pO2, Arterial 126.0 mm Hg      Comment: 83 Value above reference range        HCO3, Arterial 35.9 mmol/L      Base Excess, Arterial 7.9 mmol/L      Hemoglobin, Blood Gas 17.6 g/dL      Comment: 83 Value above reference range        Hematocrit, Blood Gas 54.0 %      Oxyhemoglobin 93.8 %      Comment: 84 Value below reference range        Methemoglobin 0.00 %      Carboxyhemoglobin 5.5 %      Comment: 83 Value above reference range        CO2 Content 37.7 mmol/L      Temperature 37.0     Barometric Pressure for Blood Gas --     Comment: N/A        Modality BiPap     FIO2 50 %      Rate 14 Breaths/minute      PIP 0 cmH2O      Comment: Meter: [id number]     :  [id number]        [facility]  12     EPAP 6     pH, Temp Corrected 7.382 pH Units      pCO2, Temperature Corrected 60.4 mm Hg      pO2, Temperature Corrected 126 mm Hg     CBC & Differential [[id number]]  (Abnormal) Collected: 06/07/25 2105    Specimen: Blood Updated: 06/07/25 2119    Narrative:      The following orders were created for panel order CBC & Differential.  Procedure                               Abnormality         Status                     ---------                               -----------         ------                     CBC Auto Differential[[id number]]        Abnormal            Final result                 Please view results for these tests on the individual orders.    CBC Auto Differential [[id number]]  (Abnormal) Collected: 06/07/25 2105    Specimen: Blood Updated: 06/07/25 2119     WBC 8.44 10*3/mm3      RBC 5.44 10*6/mm3      Hemoglobin 17.4 g/dL      Hematocrit 54.4 %      .0 fL      MCH 32.0 pg      MCHC 32.0 g/dL      RDW 12.6 %      RDW-SD 46.9 fl      MPV 10.1 fL      Platelets 153 10*3/mm3      Neutrophil % 76.2 %      Lymphocyte % 15.9 %      Monocyte % 6.3 %      Eosinophil % 0.8 %      Basophil % 0.4 %      Immature Grans % 0.4 %      Neutrophils, Absolute 6.44 10*3/mm3      Lymphocytes, Absolute 1.34 10*3/mm3      Monocytes, Absolute 0.53 10*3/mm3      Eosinophils, Absolute 0.07 10*3/mm3      Basophils, Absolute 0.03 10*3/mm3      Immature Grans, Absolute 0.03 10*3/mm3      nRBC 0.0 /100 WBC           Imaging Results (All)       Procedure Component Value Units Date/Time    XR Chest 1 View [[id number]] Collected: 06/07/25 2156     Updated: 06/07/25 2159    Narrative:      XR CHEST 1 VW    Date of Exam: 6/7/2025 9:03 PM EDT    Indication: sob    Comparison: 5/21/2025.    Findings:  Stable chronic interstitial disease and emphysema. No new lung opacity. No pneumothorax or pleural effusion. Heart size and pulmonary vasculature appear within normal limits. No acute osseous abnormalities.      Impression:       Impression:  Chronic interstitial disease and emphysema without superimposed acute process of the chest.          Electronically Signed: Claude Jon MD    2025 9:56 PM EDT    Workstation ID: [id number]          ECG/EMG Results (all)       Procedure Component Value Units Date/Time    ECG 12 Lead Dyspnea [[id number]] Collected: 25     Updated: 25     QT Interval 406 ms      QTC Interval 444 ms     Narrative:      Test Reason : Dyspnea  Blood Pressure :   */*   mmHG  Vent. Rate :  72 BPM     Atrial Rate :  72 BPM     P-R Int : 172 ms          QRS Dur :  88 ms      QT Int : 406 ms       P-R-T Axes :  77  20  33 degrees    QTcB Int : 444 ms    Normal sinus rhythm  Normal ECG  When compared with ECG of 21-May-2025 13:06,  PA interval has decreased    Referred By: EDMD           Confirmed By:     Telemetry Scan [[id number]] Resulted: 25     Updated: 25             Physician Progress Notes (all)        Roxy Zafar MD at 25 0654              [facility] Medicine Services  PROGRESS NOTE    Patient Name: Elvi Keita  : 1955  MRN: [id number]    Date of Admission: 2025  Primary Care Physician: Provider, No Known    Subjective   Subjective     CC:  Short of breath    HPI:  The patient is a 70-year-old admitted with COPD exacerbation and worsening oxygen requirements.  She tells me she usually wears 3 L nasal cannula at all times but had increased it to 4 and had continued symptoms shortness of breath pain with inspiration and movement.  She feels 50% better today than before she came.  She has been treated with IV antibiotics, IV steroids and IV Toradol.  We discussed CODE STATUS because she refused to wear BiPAP and she states for now she would like to be a full code but has not really considered the options.  We had a lengthy discussion about what life support might look like in a patient with COPD she plans to discuss these things with her  family.  She would designate her daughter Margret as her healthcare surrogate if she were unable to make decisions for self.      Objective   Objective     Vital Signs:   Temp:  [98 °F (36.7 °C)-98.3 °F (36.8 °C)] 98 °F (36.7 °C)  Heart Rate:  [70-92] 72  Resp:  [24-29] 24  BP: (112-165)/(53-79) 148/65  Flow (L/min) (Oxygen Therapy):  [6] 6     Physical Exam:  Constitutional: No acute distress, awake, alert  HENT: NCAT, mucous membranes moist  Respiratory: Decreased breath sounds with faint crackles bilaterally, respiratory effort normal   Cardiovascular: RRR  Gastrointestinal: Positive bowel sounds, soft, nontender, nondistended  Musculoskeletal: No bilateral ankle edema  Psychiatric: Appropriate affect, cooperative  Neurologic: Oriented x 3, strength symmetric in all extremities, Cranial Nerves grossly intact to confrontation, speech clear  Skin: No rashes      Results Reviewed:  LAB RESULTS:      Lab 06/08/25 0343 06/07/25 2225 06/07/25 2105   WBC 6.72  --  8.44   HEMOGLOBIN 15.9  --  17.4*   HEMATOCRIT 50.1*  --  54.4*   PLATELETS 146  --  153   NEUTROS ABS 6.24  --  6.44   IMMATURE GRANS (ABS) 0.02  --  0.03   LYMPHS ABS 0.42*  --  1.34   MONOS ABS 0.03*  --  0.53   EOS ABS 0.00  --  0.07   .0*  --  100.0*   PROCALCITONIN  --   --  0.07   LDH  --   --  213   D DIMER QUANT  --   --  0.60   HSTROP T 27* 31* 28*         Lab 06/08/25 0343 06/07/25 2105   SODIUM 135* 137   POTASSIUM 4.2 4.6   CHLORIDE 94* 94*   CO2 32.0* 33.0*   ANION GAP 9.0 10.0   BUN 10.6 9.0   CREATININE 0.76 0.82   EGFR 84.4 77.1   GLUCOSE 155* 133*   CALCIUM 8.7 9.4   MAGNESIUM 1.5*  --          Lab 06/07/25 2105   TOTAL PROTEIN 6.6   ALBUMIN 3.9   GLOBULIN 2.7   ALT (SGPT) 12   AST (SGOT) 14   BILIRUBIN 0.4   ALK PHOS 100         Lab 06/08/25  0343 06/07/25  2225 06/07/25  2105   PROBNP  --   --  1,097.0*   HSTROP T 27* 31* 28*                 Lab 06/07/25 2127   PH, ARTERIAL 7.382   PCO2, ARTERIAL 60.4*   PO2 .0*   FIO2  50   HCO3 ART 35.9*   BASE EXCESS ART 7.9*   CARBOXYHEMOGLOBIN 5.5*     Brief Urine Lab Results  (Last result in the past 365 days)        Color   Clarity   Blood   Leuk Est   Nitrite   Protein   CREAT   Urine HCG        10/14/24 1518 Yellow   Clear   Negative   Negative   Negative   Negative                   Microbiology Results Abnormal       None            XR Chest 1 View  Result Date: 6/7/2025  XR CHEST 1 VW Date of Exam: 6/7/2025 9:03 PM EDT Indication: sob Comparison: 5/21/2025. Findings: Stable chronic interstitial disease and emphysema. No new lung opacity. No pneumothorax or pleural effusion. Heart size and pulmonary vasculature appear within normal limits. No acute osseous abnormalities.     Impression: Impression: Chronic interstitial disease and emphysema without superimposed acute process of the chest. Electronically Signed: Claude Jon MD  6/7/2025 9:56 PM EDT  Workstation ID: [id number]      Results for orders placed during the hospital encounter of 08/30/24    Adult Transthoracic Echo Complete W/ Cont if Necessary Per Protocol    Interpretation Summary    Left ventricular ejection fraction appears to be greater than 70%.    Left ventricular wall thickness is consistent with mild posterior asymmetric hypertrophy.    The right ventricular cavity is mild to moderately dilated.    The left atrial cavity is mildly dilated.    Left atrial volume is mildly increased.    The right atrial cavity is moderately  dilated.    Estimated right ventricular systolic pressure from tricuspid regurgitation is normal (<35 mmHg).      Current medications:  Scheduled Meds:ampicillin-sulbactam, 3,000 mg, Intravenous, Q6H  arformoterol, 15 mcg, Nebulization, BID - RT   And  budesonide, 0.5 mg, Nebulization, BID - RT   And  revefenacin, 175 mcg, Nebulization, Daily - RT  bisoprolol, 10 mg, Oral, Daily   And  hydroCHLOROthiazide, 6.25 mg, Oral, Daily  doxycycline, 100 mg, Intravenous, BID  gabapentin, 800 mg, Oral,  Q8H  guaiFENesin, 1,200 mg, Oral, Q12H  heparin (porcine), 5,000 Units, Subcutaneous, Q8H  Hydrocod Yanick-Chlorphe Yanick ER, 5 mL, Oral, Q12H  methylPREDNISolone sodium succinate, 62.5 mg, Intravenous, Daily  nicotine, 1 patch, Transdermal, Q24H  pantoprazole, 40 mg, Oral, Q AM  sertraline, 50 mg, Oral, Daily  sodium chloride, 10 mL, Intravenous, Q12H  traZODone, 100 mg, Oral, Nightly      Continuous Infusions:   PRN Meds:.  acetaminophen **OR** acetaminophen **OR** acetaminophen    ammonium lactate    senna-docusate sodium **AND** polyethylene glycol **AND** bisacodyl **AND** bisacodyl    ipratropium-albuterol    ketorolac    nicotine polacrilex    nitroglycerin    ondansetron ODT **OR** ondansetron    sodium chloride    sodium chloride    Assessment & Plan   Assessment & Plan     Active Hospital Problems    Diagnosis  POA    **COPD exacerbation [J44.1]  Yes    Peripheral polyneuropathy [G62.9]  Unknown    Lower extremity venous stasis [I87.8]  Unknown    Mood disorder [F39]  Unknown    Essential hypertension [I10]  Yes    Acute on chronic respiratory failure with hypoxia and hypercapnia [J96.21, J96.22]  Yes    Nicotine dependence [F17.200]  Yes      Resolved Hospital Problems   No resolved problems to display.        Brief Hospital Course to date:  Elvi Keita is a 70 y.o. female with past medical history of chronic respiratory failure, COPD, hypertension, neuropathy, venous stasis and mood disorder who presented with worsening respiratory failure and acute exacerbation of her COPD.    COPD exacerbation  A/C respiratory failure w/ hypoxia / hypercapnia  Pleurisy  - baseline oxygen 4 liters NC, currently on 6 liters  - yupelri / brovana /pulmicort nebs scheduled  - duo nebs PRN  - methylprednisolone 62.5 mg IV daily  - unasyn / doxy  - pulmonary toilet / flutter valve / IS  - Patient declined BiPAP but wants to remain full code  - wean oxygen as tolerated  - Continue mucinex  - hold azithromycin, taking daily  prophylactic dose  -Continue Toradol every 6 as needed     Elevated BNP / Elevated troponin / HTN  - denies chest pain  - troponin flat 28-31-27  - EKG normal sinus rhythm  - continue bisoprolol / hctz  - ECHO 8/2024 w/ EF 70%     BLE venous stasis  - WOC evaluation  - lachydrin lotion  - has upcoming evaluation per daughter by a vein specialist     Nicotine dependence  - nicotine patch  - smoking cessation education        Expected Discharge Location and Transportation: Home  Expected Discharge   Expected Discharge Date: 6/9/2025; Expected Discharge Time:      VTE Prophylaxis:  Pharmacologic VTE prophylaxis orders are present.         AM-PAC 6 Clicks Score (PT): 12 (06/08/25 7631)    CODE STATUS:   Code Status and Medical Interventions: CPR (Attempt to Resuscitate); Full Support   Ordered at: 06/07/25 5560     Code Status (Patient has no pulse and is not breathing):    CPR (Attempt to Resuscitate)     Medical Interventions (Patient has pulse or is breathing):    Full Support       Roxy Zafar MD  06/08/25        Electronically signed by Roxy Zafar MD at 06/08/25 4216

## 2025-06-09 NOTE — NURSING NOTE
WOC consulted for compression wraps. PT Wound Care order for wraps placed.   Thank you. WOC will sign off. Please re-consult if needed.

## 2025-06-09 NOTE — PROGRESS NOTES
"          Clinical Nutrition Assessment     Patient Name: Elvi Keita  YOB: 1955  MRN: [id number]  Date of Encounter: 06/09/25 14:51 EDT  Admission date: 6/7/2025  Reason for Visit: Identified at risk by screening criteria, MST score 2+    Assessment   Nutrition Assessment   Admission Diagnosis:  COPD exacerbation [J44.1]    Problem List:    COPD exacerbation    Nicotine dependence    Essential hypertension    Acute on chronic respiratory failure with hypoxia and hypercapnia    Peripheral polyneuropathy    Lower extremity venous stasis    Mood disorder      PMH:   She  has a past medical history of COPD (chronic obstructive pulmonary disease) and Hypertension.    PSH:  She  has a past surgical history that includes Toe Surgery and Laparoscopic tubal ligation.    Applicable Nutrition History:   SKIN: BLE venous stasis : WOC evaluated and signed off     Anthropometrics     Height: Height: 162.6 cm (64\")  Last Filed Weight: Weight: 74.8 kg (164 lb 14.4 oz) (06/09/25 0415)  Method: Weight Method: Bed scale  BMI: BMI (Calculated): 28.3    UBW:     Weight      Weight (kg) Weight (lbs) Weight Method   10/27/2021 77.111 kg  170 lb  Stated    8/30/2024 77.111 kg  170 lb      74.934 kg  165 lb 3.2 oz     8/31/2024 74.844 kg  165 lb     10/14/2024 72.576 kg  160 lb  Stated    5/21/2025 77.111 kg  170 lb  Stated    6/7/2025 77.1 kg  169 lb 15.6 oz     6/8/2025 73.619 kg  162 lb 4.8 oz  Bed scale    6/9/2025 74.798 kg  164 lb 14.4 oz  Bed scale      Weight change: No significant changes    Nutrition Focused Physical Exam    Date: 6/9    Pt does not meet criteria for malnutrition diagnosis, at this time.      Subjective   Reported/Observed/Food/Nutrition Related History:   6/9  Patient triggered for nutrition assessment with MST 2, poor intake and weight loss. Now weight changes in EMR.  Patient reports good appetite, denies any recent weight changes.  No chewing or swallowing to report.  No NKFA. Patient denied any " nutrition needs at this time.     Current Nutrition Prescription   PO: Diet: Cardiac; Healthy Heart (2-3 Na+); Fluid Consistency: Thin (IDDSI 0)  Oral Nutrition Supplement:   Intake: Insufficient data 75% x 1 meal     Assessment & Plan   Nutrition Diagnosis   Date:  6/9            Updated:    Problem No nutrition diagnosis at this time    Etiology    Signs/Symptoms    Status: New    Goal / Objectives:   Nutrition to support treatment and Establish PO      Nutrition Intervention      Follow treatment progress, Care plan reviewed, Interview for preferences, Encourage intake      Monitoring/Evaluation:   Per protocol, I&O, PO intake, Pertinent labs, Symptoms    Saskia Wilkinson RD  Time Spent:

## 2025-06-09 NOTE — CASE MANAGEMENT/SOCIAL WORK
Discharge Planning Assessment  [facility]     Patient Name: Elvi Keita  MRN: [id number]  Today's Date: 6/9/2025    Admit Date: 6/7/2025    Plan: Home   Discharge Needs Assessment       Row Name 06/09/25 1301       Living Environment    People in Home spouse    Name(s) of People in Home Govind    Current Living Arrangements home    Potentially Unsafe Housing Conditions unable to assess    In the past 12 months has the electric, gas, oil, or water company threatened to shut off services in your home? No    Primary Care Provided by self    Provides Primary Care For no one    Family Caregiver if Needed child(fani), adult    Family Caregiver Names Margret Guy, terese    Quality of Family Relationships unable to assess    Able to Return to Prior Arrangements yes       Resource/Environmental Concerns    Resource/Environmental Concerns none    Transportation Concerns none       Transportation Needs    In the past 12 months, has lack of transportation kept you from medical appointments or from getting medications? no    In the past 12 months, has lack of transportation kept you from meetings, work, or from getting things needed for daily living? No       Food Insecurity    Within the past 12 months, you worried that your food would run out before you got the money to buy more. Never true    Within the past 12 months, the food you bought just didn't last and you didn't have money to get more. Never true       Transition Planning    Patient/Family Anticipates Transition to home with family    Patient/Family Anticipated Services at Transition     Transportation Anticipated family or friend will provide       Discharge Needs Assessment    Equipment Currently Used at Home oxygen;nebulizer  Home O2 thru [facility], 2-4L NC.  No longer has Trilogy NIV d/t non-compliance.    Concerns to be Addressed discharge planning    Do you want help finding or keeping work or a job? I do not need or want help    Do you want help  with school or training? For example, starting or completing job training or getting a high school diploma, GED or equivalent No                   Discharge Plan       Row Name 06/09/25 1303       Plan    Plan Home    Patient/Family in Agreement with Plan yes    Plan Comments Spoke with patient at bedside to initiate discharge planning.  Confirmed patient and spouse reside in [facility].  Patient's PCP will be Cherelle Lucas and she has an appointment on June 26 at 1145.  Patient's insurance is [employer] Medicare.  Patient states she is independent with ADLs and mobility; she does have access to a cane and walker, if needed.  Patient states she uses home oxygen from [employer] and is using 2-4L NC.  Patient states she did  have a Trilogy NIV in October 2024 but was non-compliant and it was returned to [facility].  Patient's goal is to discharge home and states her daughter can transport.  Case Management will continue to follow.    Final Discharge Disposition Code 01 - home or self-care                  Continued Care and Services - Admitted Since 6/7/2025       Durable Medical Equipment       Service Provider Request Status Services Address Phone Fax Patient Preferred    [facility] - [place] Pending - No Request Sent -- [address] [place] [phone] --                  Expected Discharge Date and Time       Expected Discharge Date Expected Discharge Time    Jun 9, 2025            Demographic Summary       Row Name 06/09/25 1301       General Information    Referral Source admission list    Reason for Consult discharge planning    Preferred Language English       Contact Information    Permission Granted to Share Info With                    Functional Status       Row Name 06/09/25 1301       Functional Status    Usual Activity Tolerance good       Physical Activity    On average, how many days per week do you engage in moderate to strenuous exercise (like a brisk  walk)? 0 days    On average, how many minutes do you engage in exercise at this level? 0 min    Number of minutes of exercise per week 0       Functional Status, IADL    Medications independent    Meal Preparation independent    Housekeeping independent    Laundry independent    Shopping independent    If for any reason you need help with day-to-day activities such as bathing, preparing meals, shopping, managing finances, etc., do you get the help you need? I don't need any help                   Psychosocial    No documentation.                  Abuse/Neglect    No documentation.                  Legal    No documentation.                  Substance Abuse    No documentation.                  Patient Forms    No documentation.                     Kera Castellon RN

## 2025-06-09 NOTE — PLAN OF CARE
Problem: Noninvasive Ventilation Acute  Goal: Effective Unassisted Ventilation and Oxygenation  Intervention: Monitor and Manage Noninvasive Ventilation  Recent Flowsheet Documentation  Taken 6/8/2025 2111 by Rufina Guerrero RN  Airway/Ventilation Management: position adjusted     Problem: Adult Inpatient Plan of Care  Goal: Absence of Hospital-Acquired Illness or Injury  Intervention: Identify and Manage Fall Risk  Recent Flowsheet Documentation  Taken 6/9/2025 0600 by Rufina Guerrero RN  Safety Promotion/Fall Prevention:   activity supervised   assistive device/personal items within reach   clutter free environment maintained   fall prevention program maintained   lighting adjusted   nonskid shoes/slippers when out of bed   safety round/check completed   room organization consistent  Taken 6/9/2025 0400 by Rufina Guerrero RN  Safety Promotion/Fall Prevention:   activity supervised   assistive device/personal items within reach   clutter free environment maintained   fall prevention program maintained   lighting adjusted   nonskid shoes/slippers when out of bed   safety round/check completed   room organization consistent  Taken 6/9/2025 0200 by Rufina Guerrero RN  Safety Promotion/Fall Prevention:   activity supervised   assistive device/personal items within reach   clutter free environment maintained   fall prevention program maintained   lighting adjusted   nonskid shoes/slippers when out of bed   safety round/check completed   room organization consistent  Taken 6/9/2025 0000 by Rufina Guerrero RN  Safety Promotion/Fall Prevention:   activity supervised   assistive device/personal items within reach   clutter free environment maintained   fall prevention program maintained   lighting adjusted   nonskid shoes/slippers when out of bed   safety round/check completed   room organization consistent  Taken 6/8/2025 2222 by Rufina Guerrero RN  Safety Promotion/Fall Prevention:   activity supervised   assistive  device/personal items within reach   clutter free environment maintained   fall prevention program maintained   lighting adjusted   nonskid shoes/slippers when out of bed   safety round/check completed   room organization consistent  Taken 6/8/2025 2111 by Rufina Guerrero RN  Safety Promotion/Fall Prevention:   activity supervised   assistive device/personal items within reach   clutter free environment maintained   fall prevention program maintained   lighting adjusted   nonskid shoes/slippers when out of bed   safety round/check completed   room organization consistent  Intervention: Prevent Skin Injury  Recent Flowsheet Documentation  Taken 6/9/2025 0600 by Rufina Guerrero RN  Body Position: position changed independently  Skin Protection:   incontinence pads utilized   transparent dressing maintained   pulse oximeter probe site changed  Taken 6/9/2025 0400 by Rufina Guerrero RN  Body Position: position changed independently  Skin Protection:   incontinence pads utilized   transparent dressing maintained   pulse oximeter probe site changed  Taken 6/9/2025 0200 by Rufina Guerrero RN  Body Position: position changed independently  Skin Protection:   incontinence pads utilized   transparent dressing maintained   pulse oximeter probe site changed  Taken 6/9/2025 0000 by Rufina Guerrero RN  Body Position: position changed independently  Skin Protection:   incontinence pads utilized   transparent dressing maintained   pulse oximeter probe site changed  Taken 6/8/2025 2222 by Rufina Guerrero RN  Body Position: position changed independently  Skin Protection:   incontinence pads utilized   transparent dressing maintained   pulse oximeter probe site changed  Taken 6/8/2025 2111 by Rufina Guerrero RN  Body Position: sitting up in bed  Skin Protection:   incontinence pads utilized   transparent dressing maintained   pulse oximeter probe site changed  Intervention: Prevent and Manage VTE (Venous Thromboembolism) Risk  Recent  Flowsheet Documentation  Taken 6/8/2025 2111 by Rufina Guerrero RN  VTE Prevention/Management: (see MAR) other (see comments)  Intervention: Prevent Infection  Recent Flowsheet Documentation  Taken 6/9/2025 0600 by Rufina Guerrero RN  Infection Prevention:   single patient room provided   rest/sleep promoted  Taken 6/9/2025 0400 by Rufina Guerrero RN  Infection Prevention:   single patient room provided   rest/sleep promoted  Taken 6/9/2025 0200 by Rufina Guerrero RN  Infection Prevention:   single patient room provided   rest/sleep promoted  Taken 6/9/2025 0000 by Rufina Guerrero RN  Infection Prevention:   single patient room provided   rest/sleep promoted  Taken 6/8/2025 2222 by Rufina Guerrero RN  Infection Prevention:   single patient room provided   rest/sleep promoted  Taken 6/8/2025 2111 by Rufina Guerrero RN  Infection Prevention:   single patient room provided   rest/sleep promoted  Goal: Optimal Comfort and Wellbeing  Intervention: Monitor Pain and Promote Comfort  Recent Flowsheet Documentation  Taken 6/8/2025 2111 by Rufina Guerrero RN  Pain Management Interventions:   pillow support provided   pain medication given  Intervention: Provide Person-Centered Care  Recent Flowsheet Documentation  Taken 6/9/2025 0200 by Rufina Guerrero RN  Trust Relationship/Rapport: care explained  Taken 6/8/2025 2222 by Rufina Guerrero RN  Trust Relationship/Rapport:   care explained   choices provided   emotional support provided   empathic listening provided   questions answered   questions encouraged   reassurance provided   thoughts/feelings acknowledged  Taken 6/8/2025 2111 by Rufina Guerrero RN  Trust Relationship/Rapport:   care explained   choices provided   emotional support provided   empathic listening provided   questions answered   questions encouraged   reassurance provided   thoughts/feelings acknowledged     Problem: Comorbidity Management  Goal: Maintenance of Asthma Control  Intervention: Maintain Asthma  Symptom Control  Recent Flowsheet Documentation  Taken 6/8/2025 2111 by Rufina Guerrero RN  Medication Review/Management: medications reviewed  Goal: Maintenance of COPD Symptom Control  Intervention: Maintain COPD (Chronic Obstructive Pulmonary Disease) Symptom Control  Recent Flowsheet Documentation  Taken 6/8/2025 2111 by Rufina Guerrero RN  Breathing Techniques/Airway Clearance: deep/controlled cough encouraged  Medication Review/Management: medications reviewed  Goal: Blood Pressure in Desired Range  Intervention: Maintain Blood Pressure Management  Recent Flowsheet Documentation  Taken 6/8/2025 2111 by Rufina Guerrero RN  Medication Review/Management: medications reviewed     Problem: Skin Injury Risk Increased  Goal: Skin Health and Integrity  Intervention: Optimize Skin Protection  Recent Flowsheet Documentation  Taken 6/9/2025 0600 by Rufina Guerrero RN  Activity Management: activity encouraged  Pressure Reduction Techniques: frequent weight shift encouraged  Head of Bed (HOB) Positioning: [facility] elevated  Pressure Reduction Devices:   pressure-redistributing mattress utilized   positioning supports utilized  Skin Protection:   incontinence pads utilized   transparent dressing maintained   pulse oximeter probe site changed  Taken 6/9/2025 0400 by Rufina Guerrero RN  Activity Management: activity encouraged  Pressure Reduction Techniques: frequent weight shift encouraged  Head of Bed (HOB) Positioning: HOB elevated  Pressure Reduction Devices:   pressure-redistributing mattress utilized   positioning supports utilized  Skin Protection:   incontinence pads utilized   transparent dressing maintained   pulse oximeter probe site changed  Taken 6/9/2025 0200 by Rufina Guerrero RN  Activity Management: activity encouraged  Pressure Reduction Techniques: frequent weight shift encouraged  Head of Bed (HOB) Positioning: HOB elevated  Pressure Reduction Devices:   pressure-redistributing mattress utilized   positioning  supports utilized  Skin Protection:   incontinence pads utilized   transparent dressing maintained   pulse oximeter probe site changed  Taken 6/9/2025 0000 by Rufina Guerrero RN  Activity Management: activity encouraged  Pressure Reduction Techniques: frequent weight shift encouraged  Head of Bed (HOB) Positioning: [facility] elevated  Pressure Reduction Devices:   pressure-redistributing mattress utilized   positioning supports utilized  Skin Protection:   incontinence pads utilized   transparent dressing maintained   pulse oximeter probe site changed  Taken 6/8/2025 2222 by Rufina Guerrero RN  Activity Management:   activity encouraged   up to bedside commode  Pressure Reduction Techniques: frequent weight shift encouraged  Head of Bed (HOB) Positioning: HOB elevated  Pressure Reduction Devices:   pressure-redistributing mattress utilized   positioning supports utilized  Skin Protection:   incontinence pads utilized   transparent dressing maintained   pulse oximeter probe site changed  Taken 6/8/2025 2111 by Rufina Guerrero RN  Activity Management: activity encouraged  Pressure Reduction Techniques: frequent weight shift encouraged  Head of Bed (HOB) Positioning: [facility] elevated  Pressure Reduction Devices:   pressure-redistributing mattress utilized   positioning supports utilized  Skin Protection:   incontinence pads utilized   transparent dressing maintained   pulse oximeter probe site changed     Problem: Fall Injury Risk  Goal: Absence of Fall and Fall-Related Injury  Intervention: Identify and Manage Contributors  Recent Flowsheet Documentation  Taken 6/8/2025 2111 by Rufina Guerrero, RN  Medication Review/Management: medications reviewed  Intervention: Promote Injury-Free Environment  Recent Flowsheet Documentation  Taken 6/9/2025 0600 by Rufina Guerrero, RN  Safety Promotion/Fall Prevention:   activity supervised   assistive device/personal items within reach   clutter free environment maintained   fall prevention  program maintained   lighting adjusted   nonskid shoes/slippers when out of bed   safety round/check completed   room organization consistent  Taken 6/9/2025 0400 by Rufina Guerrero RN  Safety Promotion/Fall Prevention:   activity supervised   assistive device/personal items within reach   clutter free environment maintained   fall prevention program maintained   lighting adjusted   nonskid shoes/slippers when out of bed   safety round/check completed   room organization consistent  Taken 6/9/2025 0200 by Rufina Guerrero RN  Safety Promotion/Fall Prevention:   activity supervised   assistive device/personal items within reach   clutter free environment maintained   fall prevention program maintained   lighting adjusted   nonskid shoes/slippers when out of bed   safety round/check completed   room organization consistent  Taken 6/9/2025 0000 by Rufina Guerrero RN  Safety Promotion/Fall Prevention:   activity supervised   assistive device/personal items within reach   clutter free environment maintained   fall prevention program maintained   lighting adjusted   nonskid shoes/slippers when out of bed   safety round/check completed   room organization consistent  Taken 6/8/2025 2222 by Rufina Guerrero RN  Safety Promotion/Fall Prevention:   activity supervised   assistive device/personal items within reach   clutter free environment maintained   fall prevention program maintained   lighting adjusted   nonskid shoes/slippers when out of bed   safety round/check completed   room organization consistent  Taken 6/8/2025 2111 by Rufina Guerrero RN  Safety Promotion/Fall Prevention:   activity supervised   assistive device/personal items within reach   clutter free environment maintained   fall prevention program maintained   lighting adjusted   nonskid shoes/slippers when out of bed   safety round/check completed   room organization consistent   Goal Outcome Evaluation:

## 2025-06-09 NOTE — PROGRESS NOTES
[facility]  PROGRESS NOTE    Patient Name: Elvi Keita  : 1955  MRN: [id number]    Date of Admission: 2025  Primary Care Physician: Provider, No Known    Subjective   Subjective     CC:  Short of breath    HPI:  She feels slightly improved but she is still having some chest soreness from coughing.  Breathing is getting better      Objective   Objective     Vital Signs:   Temp:  [97 °F (36.1 °C)-98.3 °F (36.8 °C)] 97.6 °F (36.4 °C)  Heart Rate:  [62-78] 65  Resp:  [17-20] 20  BP: (137-182)/(52-72) 177/72  Flow (L/min) (Oxygen Therapy):  [5-6] 5     Physical Exam:  Constitutional: No acute distress, awake, alert  HENT: NCAT, mucous membranes moist  Respiratory: Decreased breath sounds with faint crackles bilaterally, diffuse wheezing  Cardiovascular: RRR  Gastrointestinal: Positive bowel sounds, soft, nontender, nondistended  Musculoskeletal: No bilateral ankle edema  Psychiatric: Appropriate affect, cooperative  Neurologic: Oriented x 3, strength symmetric in all extremities, Cranial Nerves grossly intact to confrontation, speech clear  Skin: No rashes      Results Reviewed:  LAB RESULTS:      Lab 25  0343 06/07/25  2225 06/07/25  2105   WBC 6.72  --  8.44   HEMOGLOBIN 15.9  --  17.4*   HEMATOCRIT 50.1*  --  54.4*   PLATELETS 146  --  153   NEUTROS ABS 6.24  --  6.44   IMMATURE GRANS (ABS) 0.02  --  0.03   LYMPHS ABS 0.42*  --  1.34   MONOS ABS 0.03*  --  0.53   EOS ABS 0.00  --  0.07   .0*  --  100.0*   PROCALCITONIN  --   --  0.07   LDH  --   --  213   D DIMER QUANT  --   --  0.60   HSTROP T 27* 31* 28*         Lab 25  0343 06/07/25  2105   SODIUM 135* 137   POTASSIUM 4.2 4.6   CHLORIDE 94* 94*   CO2 32.0* 33.0*   ANION GAP 9.0 10.0   BUN 10.6 9.0   CREATININE 0.76 0.82   EGFR 84.4 77.1   GLUCOSE 155* 133*   CALCIUM 8.7 9.4   MAGNESIUM 1.5*  --          Lab 25  2105   TOTAL PROTEIN 6.6   ALBUMIN 3.9   GLOBULIN 2.7   ALT (SGPT) 12   AST  (SGOT) 14   BILIRUBIN 0.4   ALK PHOS 100         Lab 06/08/25  0343 06/07/25  2225 06/07/25  2105   PROBNP  --   --  1,097.0*   HSTROP T 27* 31* 28*                 Lab 06/07/25 2127   PH, ARTERIAL 7.382   PCO2, ARTERIAL 60.4*   PO2 .0*   FIO2 50   HCO3 ART 35.9*   BASE EXCESS ART 7.9*   CARBOXYHEMOGLOBIN 5.5*     Brief Urine Lab Results  (Last result in the past 365 days)        Color   Clarity   Blood   Leuk Est   Nitrite   Protein   CREAT   Urine HCG        10/14/24 1518 Yellow   Clear   Negative   Negative   Negative   Negative                   Microbiology Results Abnormal       None            XR Chest 1 View  Result Date: 6/7/2025  XR CHEST 1 VW Date of Exam: 6/7/2025 9:03 PM EDT Indication: sob Comparison: 5/21/2025. Findings: Stable chronic interstitial disease and emphysema. No new lung opacity. No pneumothorax or pleural effusion. Heart size and pulmonary vasculature appear within normal limits. No acute osseous abnormalities.     Impression: Impression: Chronic interstitial disease and emphysema without superimposed acute process of the chest. Electronically Signed: Claude Jon MD  6/7/2025 9:56 PM EDT  Workstation ID: [id number]      Results for orders placed during the hospital encounter of 08/30/24    Adult Transthoracic Echo Complete W/ Cont if Necessary Per Protocol    Interpretation Summary    Left ventricular ejection fraction appears to be greater than 70%.    Left ventricular wall thickness is consistent with mild posterior asymmetric hypertrophy.    The right ventricular cavity is mild to moderately dilated.    The left atrial cavity is mildly dilated.    Left atrial volume is mildly increased.    The right atrial cavity is moderately  dilated.    Estimated right ventricular systolic pressure from tricuspid regurgitation is normal (<35 mmHg).      Current medications:  Scheduled Meds:ampicillin-sulbactam, 3,000 mg, Intravenous, Q6H  arformoterol, 15 mcg, Nebulization, BID - RT    And  budesonide, 0.5 mg, Nebulization, BID - RT   And  revefenacin, 175 mcg, Nebulization, Daily - RT  bisoprolol, 10 mg, Oral, Daily   And  hydroCHLOROthiazide, 6.25 mg, Oral, Daily  doxycycline, 100 mg, Oral, BID  gabapentin, 800 mg, Oral, Q8H  guaiFENesin, 1,200 mg, Oral, Q12H  heparin (porcine), 5,000 Units, Subcutaneous, Q8H  Hydrocod Yanick-Chlorphe Yanick ER, 5 mL, Oral, Q12H  methylPREDNISolone sodium succinate, 62.5 mg, Intravenous, Daily  nicotine, 1 patch, Transdermal, Q24H  pantoprazole, 40 mg, Oral, Q AM  sertraline, 50 mg, Oral, Daily  sodium chloride, 10 mL, Intravenous, Q12H  traZODone, 100 mg, Oral, Nightly      Continuous Infusions:   PRN Meds:.  acetaminophen **OR** acetaminophen **OR** acetaminophen    ammonium lactate    senna-docusate sodium **AND** polyethylene glycol **AND** bisacodyl **AND** bisacodyl    hydrOXYzine    ipratropium-albuterol    nicotine polacrilex    nitroglycerin    ondansetron ODT **OR** ondansetron    sodium chloride    sodium chloride    Assessment & Plan   Assessment & Plan     Active Hospital Problems    Diagnosis  POA    **COPD exacerbation [J44.1]  Yes    Peripheral polyneuropathy [G62.9]  Unknown    Lower extremity venous stasis [I87.8]  Unknown    Mood disorder [F39]  Unknown    Essential hypertension [I10]  Yes    Acute on chronic respiratory failure with hypoxia and hypercapnia [J96.21, J96.22]  Yes    Nicotine dependence [F17.200]  Yes      Resolved Hospital Problems   No resolved problems to display.        Brief Hospital Course to date:  Elvi Keita is a 70 y.o. female with past medical history of chronic respiratory failure, COPD, hypertension, neuropathy, venous stasis and mood disorder who presented with worsening respiratory failure and acute exacerbation of her COPD.    COPD exacerbation  A/C respiratory failure w/ hypoxia / hypercapnia  Pleurisy  - Wean O2 as able  - yupelri / brovana /pulmicort nebs scheduled  - duo nebs PRN  - methylprednisolone 62.5 mg IV  daily  - unasyn / doxy  - pulmonary toilet / flutter valve / IS  - Patient declined BiPAP but wants to remain full code  - wean oxygen as tolerated  - Continue mucinex  - hold azithromycin, taking daily prophylactic dose  -Continue Toradol every 6 as needed     Elevated BNP / Elevated troponin / HTN  - denies chest pain  - troponin flat 28-31-27  - EKG normal sinus rhythm  - continue bisoprolol / hctz  - ECHO 8/2024 w/ EF 70%     BLE venous stasis  - WOC evaluation  - lachydrin lotion  - has upcoming evaluation per daughter by a vein specialist     Nicotine dependence  - nicotine patch  - smoking cessation education        Expected Discharge Location and Transportation: Home  Expected Discharge   Expected Discharge Date: 6/11/2025; Expected Discharge Time:      VTE Prophylaxis:  Pharmacologic VTE prophylaxis orders are present.         AM-PAC 6 Clicks Score (PT): 18 (06/08/25 2118)    CODE STATUS:   Code Status and Medical Interventions: CPR (Attempt to Resuscitate); Full Support   Ordered at: 06/07/25 9701     Code Status (Patient has no pulse and is not breathing):    CPR (Attempt to Resuscitate)     Medical Interventions (Patient has pulse or is breathing):    Full Support       Chelly Sandhu MD  06/09/25

## 2025-06-10 LAB
ANION GAP SERPL CALCULATED.3IONS-SCNC: 5 MMOL/L (ref 5–15)
BUN SERPL-MCNC: 15.1 MG/DL (ref 8–23)
BUN/CREAT SERPL: 23.2 (ref 7–25)
CALCIUM SPEC-SCNC: 8.7 MG/DL (ref 8.6–10.5)
CHLORIDE SERPL-SCNC: 94 MMOL/L (ref 98–107)
CO2 SERPL-SCNC: 36 MMOL/L (ref 22–29)
CREAT SERPL-MCNC: 0.65 MG/DL (ref 0.57–1)
EGFRCR SERPLBLD CKD-EPI 2021: 94.9 ML/MIN/1.73
GLUCOSE SERPL-MCNC: 88 MG/DL (ref 65–99)
POTASSIUM SERPL-SCNC: 4.4 MMOL/L (ref 3.5–5.2)
SODIUM SERPL-SCNC: 135 MMOL/L (ref 136–145)

## 2025-06-10 PROCEDURE — 25010000002 AMPICILLIN-SULBACTAM PER 1.5 G: Performed by: NURSE PRACTITIONER

## 2025-06-10 PROCEDURE — 25010000002 KETOROLAC TROMETHAMINE PER 15 MG: Performed by: NURSE PRACTITIONER

## 2025-06-10 PROCEDURE — 25010000002 HEPARIN (PORCINE) PER 1000 UNITS: Performed by: NURSE PRACTITIONER

## 2025-06-10 PROCEDURE — 80048 BASIC METABOLIC PNL TOTAL CA: CPT | Performed by: STUDENT IN AN ORGANIZED HEALTH CARE EDUCATION/TRAINING PROGRAM

## 2025-06-10 PROCEDURE — 97161 PT EVAL LOW COMPLEX 20 MIN: CPT

## 2025-06-10 PROCEDURE — 99232 SBSQ HOSP IP/OBS MODERATE 35: CPT | Performed by: NURSE PRACTITIONER

## 2025-06-10 PROCEDURE — 94799 UNLISTED PULMONARY SVC/PX: CPT

## 2025-06-10 PROCEDURE — 63710000001 REVEFENACIN 175 MCG/3ML SOLUTION: Performed by: NURSE PRACTITIONER

## 2025-06-10 PROCEDURE — 94761 N-INVAS EAR/PLS OXIMETRY MLT: CPT

## 2025-06-10 PROCEDURE — 94664 DEMO&/EVAL PT USE INHALER: CPT

## 2025-06-10 PROCEDURE — 25010000002 METHYLPREDNISOLONE PER 125 MG: Performed by: NURSE PRACTITIONER

## 2025-06-10 RX ORDER — KETOROLAC TROMETHAMINE 15 MG/ML
15 INJECTION, SOLUTION INTRAMUSCULAR; INTRAVENOUS ONCE
Status: COMPLETED | OUTPATIENT
Start: 2025-06-10 | End: 2025-06-10

## 2025-06-10 RX ORDER — KETOROLAC TROMETHAMINE 15 MG/ML
15 INJECTION, SOLUTION INTRAMUSCULAR; INTRAVENOUS EVERY 6 HOURS PRN
Status: DISCONTINUED | OUTPATIENT
Start: 2025-06-10 | End: 2025-06-11 | Stop reason: HOSPADM

## 2025-06-10 RX ORDER — CEFUROXIME AXETIL 250 MG/1
750 TABLET ORAL 3 TIMES DAILY
Status: DISCONTINUED | OUTPATIENT
Start: 2025-06-10 | End: 2025-06-11 | Stop reason: HOSPADM

## 2025-06-10 RX ORDER — PREDNISONE 20 MG/1
40 TABLET ORAL
Status: DISCONTINUED | OUTPATIENT
Start: 2025-06-11 | End: 2025-06-11 | Stop reason: HOSPADM

## 2025-06-10 RX ORDER — BISOPROLOL FUMARATE 5 MG/1
10 TABLET, FILM COATED ORAL DAILY
Status: DISCONTINUED | OUTPATIENT
Start: 2025-06-10 | End: 2025-06-11 | Stop reason: HOSPADM

## 2025-06-10 RX ORDER — HYDROCHLOROTHIAZIDE 25 MG/1
12.5 TABLET ORAL DAILY
Status: DISCONTINUED | OUTPATIENT
Start: 2025-06-10 | End: 2025-06-11 | Stop reason: HOSPADM

## 2025-06-10 RX ADMIN — GUAIFENESIN 1200 MG: 600 TABLET, EXTENDED RELEASE ORAL at 20:55

## 2025-06-10 RX ADMIN — HEPARIN SODIUM 5000 UNITS: 5000 INJECTION INTRAVENOUS; SUBCUTANEOUS at 20:54

## 2025-06-10 RX ADMIN — KETOROLAC TROMETHAMINE 15 MG: 15 INJECTION, SOLUTION INTRAMUSCULAR; INTRAVENOUS at 01:06

## 2025-06-10 RX ADMIN — HYDROCODONE POLISTIREX AND CHLORPHENIRAMINE POLISTIREX 5 ML: 10; 8 SUSPENSION, EXTENDED RELEASE ORAL at 23:52

## 2025-06-10 RX ADMIN — REVEFENACIN 175 MCG: 175 SOLUTION RESPIRATORY (INHALATION) at 07:01

## 2025-06-10 RX ADMIN — PANTOPRAZOLE SODIUM 40 MG: 40 TABLET, DELAYED RELEASE ORAL at 06:03

## 2025-06-10 RX ADMIN — ARFORMOTEROL TARTRATE 15 MCG: 15 SOLUTION RESPIRATORY (INHALATION) at 07:01

## 2025-06-10 RX ADMIN — HEPARIN SODIUM 5000 UNITS: 5000 INJECTION INTRAVENOUS; SUBCUTANEOUS at 13:27

## 2025-06-10 RX ADMIN — GUAIFENESIN 1200 MG: 600 TABLET, EXTENDED RELEASE ORAL at 08:04

## 2025-06-10 RX ADMIN — CEFUROXIME AXETIL 750 MG: 250 TABLET, FILM COATED ORAL at 20:55

## 2025-06-10 RX ADMIN — DOXYCYCLINE 100 MG: 100 CAPSULE ORAL at 08:05

## 2025-06-10 RX ADMIN — DOXYCYCLINE 100 MG: 100 CAPSULE ORAL at 20:55

## 2025-06-10 RX ADMIN — HYDROCODONE POLISTIREX AND CHLORPHENIRAMINE POLISTIREX 5 ML: 10; 8 SUSPENSION, EXTENDED RELEASE ORAL at 13:26

## 2025-06-10 RX ADMIN — Medication 10 ML: at 21:02

## 2025-06-10 RX ADMIN — TRAZODONE HYDROCHLORIDE 100 MG: 100 TABLET ORAL at 20:55

## 2025-06-10 RX ADMIN — HYDROCHLOROTHIAZIDE 12.5 MG: 25 TABLET ORAL at 08:02

## 2025-06-10 RX ADMIN — Medication 10 ML: at 12:29

## 2025-06-10 RX ADMIN — HEPARIN SODIUM 5000 UNITS: 5000 INJECTION INTRAVENOUS; SUBCUTANEOUS at 06:03

## 2025-06-10 RX ADMIN — CEFUROXIME AXETIL 750 MG: 250 TABLET, FILM COATED ORAL at 15:08

## 2025-06-10 RX ADMIN — GABAPENTIN 800 MG: 400 CAPSULE ORAL at 13:27

## 2025-06-10 RX ADMIN — BUDESONIDE 0.5 MG: 0.5 SUSPENSION RESPIRATORY (INHALATION) at 07:01

## 2025-06-10 RX ADMIN — SERTRALINE HYDROCHLORIDE 50 MG: 50 TABLET ORAL at 08:05

## 2025-06-10 RX ADMIN — GABAPENTIN 800 MG: 400 CAPSULE ORAL at 06:03

## 2025-06-10 RX ADMIN — GABAPENTIN 800 MG: 400 CAPSULE ORAL at 20:54

## 2025-06-10 RX ADMIN — ARFORMOTEROL TARTRATE 15 MCG: 15 SOLUTION RESPIRATORY (INHALATION) at 19:39

## 2025-06-10 RX ADMIN — NICOTINE 1 PATCH: 21 PATCH TRANSDERMAL at 23:52

## 2025-06-10 RX ADMIN — HYDROXYZINE HYDROCHLORIDE 25 MG: 25 TABLET ORAL at 20:55

## 2025-06-10 RX ADMIN — AMPICILLIN SODIUM AND SULBACTAM SODIUM 3000 MG: 2; 1 INJECTION, POWDER, FOR SOLUTION INTRAMUSCULAR; INTRAVENOUS at 01:06

## 2025-06-10 RX ADMIN — BUDESONIDE 0.5 MG: 0.5 SUSPENSION RESPIRATORY (INHALATION) at 19:39

## 2025-06-10 RX ADMIN — BISOPROLOL FUMARATE 10 MG: 5 TABLET, FILM COATED ORAL at 08:05

## 2025-06-10 RX ADMIN — AMPICILLIN SODIUM AND SULBACTAM SODIUM 3000 MG: 2; 1 INJECTION, POWDER, FOR SOLUTION INTRAMUSCULAR; INTRAVENOUS at 08:06

## 2025-06-10 RX ADMIN — METHYLPREDNISOLONE SODIUM SUCCINATE 62.5 MG: 125 INJECTION, POWDER, LYOPHILIZED, FOR SOLUTION INTRAMUSCULAR; INTRAVENOUS at 08:06

## 2025-06-10 NOTE — THERAPY WOUND CARE TREATMENT
Acute Care - Wound/Debridement Initial Evaluation  [facility]     Patient Name: Elvi Keita  : 1955  MRN: [id number]  Today's Date: 6/10/2025                Admit Date: 2025    Visit Dx:    ICD-10-CM ICD-9-CM   1. Acute exacerbation of chronic obstructive pulmonary disease (COPD)  J44.1 491.21   2. Acute on chronic respiratory failure with hypoxia and hypercapnia  J96.21 518.84    J96.22 786.09     799.02   3. Smoker  F17.200 305.1   4. History of hypertension  Z86.79 V12.59       Patient Active Problem List   Diagnosis    Chronic hypoxemic respiratory failure    Cor pulmonale    Generalized osteoarthritis    Hyperlipidemia    Lumbar spondylosis    Nicotine dependence    Essential hypertension    Acute on chronic respiratory failure with hypoxia and hypercapnia    COPD exacerbation    Peripheral polyneuropathy    Lower extremity venous stasis    Mood disorder        Past Medical History:   Diagnosis Date    COPD (chronic obstructive pulmonary disease)     Hypertension         Past Surgical History:   Procedure Laterality Date    LAPAROSCOPIC TUBAL LIGATION      TOE SURGERY             Wound Left posterior elbow Other (Comments) (Active)   Dressing Appearance dry;intact 25      Lymphedema       Row Name 06/10/25 0354             Skin Changes/Observations    Location/Assessment Lower Extremity (P)   -ST      Lower Extremity Conditions bilateral:;intact;dry;clean (P)   -ST      Lower Extremity Color/Pigment normal;blanchable;bilateral: (P)   -ST      Lower Extremity Skin Details mild dryness noted (P)   -ST         Lymphedema Pulses/Capillary Refill    Lymphedema Pulses/Capillary Refill lower extremity pulses;capillary refill (P)   -ST      Dorsalis Pedis Pulse right:;left:;+2 normal (P)   -ST      Posterior Tibialis Pulse not tested (P)   -ST      Capillary Refill lower extremity capillary refill (P)   -ST      Lower Extremity Capillary Refill right:;left:;less than 3 seconds (P)   -ST                 User Key  (r) = Recorded By, (t) = Taken By, (c) = Cosigned By      Initials Name Provider Type    ST Samina Delarosa, PT Student PT Student                    WOUND DEBRIDEMENT                     PT Assessment (Last 12 Hours)       PT Evaluation and Treatment       Row Name 06/10/25 1409          Physical Therapy Time and Intention    Subjective Information no complaints (P)   -ST     Document Type evaluation;wound care;discharge evaluation/summary (P)   -ST     Mode of Treatment individual therapy;physical therapy (P)   -ST     Patient Effort excellent (P)   -ST       Row Name 06/10/25 1409          General Information    Patient Profile Reviewed yes (P)   -ST     Patient Observations alert;cooperative (P)   -ST     Existing Precautions/Restrictions other (see comments);fall (P)   O2 4L/min  -ST     Risks Reviewed patient: (P)   edema  -ST     Barriers to Rehab medically complex (P)   -ST       Row Name 06/10/25 1409          Pain    Pretreatment Pain Rating 0/10 - no pain (P)   -ST     Posttreatment Pain Rating 0/10 - no pain (P)   -ST       Row Name 06/10/25 1409          Cognition    Affect/Mental Status (Cognition) WNL (P)   -ST     Orientation Status (Cognition) oriented x 3 (P)   -ST     Cognitive Function (Cognition) WNL (P)   -ST       Row Name             Wound Left posterior elbow Other (Comments)    Wound - Properties Group Present on Original Admission: Y  -SF Side: Left  -SF Orientation: posterior  -SF Location: elbow  -SF Primary Wound Type: Other  -SF Secondary Wound Type - Other: --  -SF, callus     Retired Wound - Properties Group Present on Original Admission: Y  -SF Side: Left  -SF Orientation: posterior  -SF Location: elbow  -SF    Retired Wound - Properties Group Present on Original Admission: Y  -SF Side: Left  -SF Orientation: posterior  -SF Location: elbow  -SF    Retired Wound - Properties Group Present on Original Admission: Y  -SF Side: Left  -SF Location: elbow  -SF      Row  Name 06/10/25 1409          Coping    Observed Emotional State calm;cooperative (P)   -ST     Verbalized Emotional State acceptance (P)   -ST     Trust Relationship/Rapport care explained (P)   -ST     Family/Support Persons patient;family (P)   -ST     Involvement in Care at bedside (P)   -ST       Row Name 06/10/25 1409          Plan of Care Review    Plan of Care Reviewed With patient (P)   -ST     Outcome Evaluation Patient not appropriate for skilled in patient wound care at this time due to controlled symptoms. No LE swelling present today, however skin was mildly dry upon inspection, and pateint was encouraged to continue using cream on a daily basis. Education on compression wraping for home provided. D/C PT recomended today. (P)   -ST       Row Name 06/10/25 1409          Positioning and Restraints    Pre-Treatment Position in bed (P)   -ST     Post Treatment Position bed (P)   -ST     In Bed supine;call light within reach (P)   -ST       Row Name 06/10/25 1409          Therapy Assessment/Plan (PT)    Rehab Potential (PT) good (P)   -ST     Criteria for Skilled Interventions Met (PT) no;no problems identified which require skilled intervention (P)   -ST       Row Name 06/10/25 1409          PT Evaluation Complexity    History, PT Evaluation Complexity 3 or more personal factors and/or comorbidities (P)   -ST     Examination of Body Systems (PT Eval Complexity) total of 4 or more elements (P)   -ST     Clinical Presentation (PT Evaluation Complexity) stable (P)   -ST     Clinical Decision Making (PT Evaluation Complexity) low complexity (P)   -ST     Overall Complexity (PT Evaluation Complexity) low complexity (P)   -ST       Row Name 06/10/25 1409          Therapy Plan Review/Discharge Plan (PT)    Therapy Plan Review (PT) patient (P)   -ST               User Key  (r) = Recorded By, (t) = Taken By, (c) = Cosigned By      Initials Name Provider Type    Flower Espinosa, RN Registered Nurse    ST  Samina Delarosa, PT Student PT Student                  Physical Therapy Education       Title: PT OT SLP Therapies (Not Started)       Topic: Physical Therapy (Not Started)       Point: Mobility training (Not Started)       Learner Progress:  Not documented in this visit.              Point: Home exercise program (Not Started)       Learner Progress:  Not documented in this visit.              Point: Body mechanics (Not Started)       Learner Progress:  Not documented in this visit.              Point: Precautions (Not Started)       Learner Progress:  Not documented in this visit.                                    Recommendation and Plan  Anticipated Discharge Disposition (PT): (P) other (see comments) (will defer to mobility PT)  Plan of Care Reviewed With: (P) patient           Outcome Evaluation: (P) Patient not appropriate for skilled in patient wound care at this time due to controlled symptoms. No LE swelling present today, however skin was mildly dry upon inspection, and pateint was encouraged to continue using cream on a daily basis. Education on compression wraping for home provided. D/C PT recomended today.  Plan of Care Reviewed With: (P) patient            Time Calculation   PT Charges       Row Name 06/10/25 1528             Time Calculation    Start Time 1409 (P)   -ST      PT Goal Re-Cert Due Date -- (P)   -ST         Untimed Charges    PT Eval/Re-eval Minutes 25 (P)   -ST         SNF Physical Therapy Minutes    Skilled Minutes- PT 10 min (P)   -ST         Total Minutes    Untimed Charges Total Minutes 25 (P)   -ST       Total Minutes 25 (P)   -ST                User Key  (r) = Recorded By, (t) = Taken By, (c) = Cosigned By      Initials Name Provider Type    ST Samina Delarosa, PT Student PT Student                            PT G-Codes  AM-PAC 6 Clicks Score (PT): 22       Samina Delarosa PT Student  6/10/2025

## 2025-06-10 NOTE — CASE MANAGEMENT/SOCIAL WORK
Continued Stay Note  [facility]     Patient Name: Elvi Keita  MRN: [id number]  Today's Date: 6/10/2025    Admit Date: 6/7/2025    Plan: Home with spouse   Discharge Plan       Row Name 06/10/25 0924       Plan    Plan Home with spouse    Patient/Family in Agreement with Plan yes    Plan Comments Spoke to patient at bedside. Plan is home with spouse. Spouse will transport. Patient denies any discharge needs at this time. CM will continue to follow.    Final Discharge Disposition Code 01 - home or self-care                   Discharge Codes    No documentation.                 Expected Discharge Date and Time       Expected Discharge Date Expected Discharge Time    Jun 11, 2025               Paco Aceves RN

## 2025-06-10 NOTE — PLAN OF CARE
Problem: Noninvasive Ventilation Acute  Goal: Effective Unassisted Ventilation and Oxygenation  Outcome: Progressing     Problem: Adult Inpatient Plan of Care  Goal: Plan of Care Review  Outcome: Progressing  Flowsheets (Taken 6/10/2025 1821)  Progress: improving  Plan of Care Reviewed With: patient     Problem: Adult Inpatient Plan of Care  Goal: Patient-Specific Goal (Individualized)  Outcome: Progressing     Problem: Adult Inpatient Plan of Care  Goal: Absence of Hospital-Acquired Illness or Injury  Outcome: Progressing  Intervention: Identify and Manage Fall Risk  Recent Flowsheet Documentation  Taken 6/10/2025 1800 by Bernie Eric, RN  Safety Promotion/Fall Prevention:   safety round/check completed   room organization consistent   nonskid shoes/slippers when out of bed   assistive device/personal items within reach   clutter free environment maintained   fall prevention program maintained  Taken 6/10/2025 1600 by Bernie Eric, RN  Safety Promotion/Fall Prevention:   safety round/check completed   room organization consistent   nonskid shoes/slippers when out of bed   assistive device/personal items within reach   clutter free environment maintained   fall prevention program maintained  Taken 6/10/2025 1400 by Bernie Eric, RN  Safety Promotion/Fall Prevention:   safety round/check completed   room organization consistent   nonskid shoes/slippers when out of bed   assistive device/personal items within reach   clutter free environment maintained   fall prevention program maintained  Taken 6/10/2025 1200 by Bernie Eric, RN  Safety Promotion/Fall Prevention:   safety round/check completed   room organization consistent   nonskid shoes/slippers when out of bed   assistive device/personal items within reach   clutter free environment maintained   fall prevention program maintained  Taken 6/10/2025 1000 by Bernie Eric, RN  Safety Promotion/Fall Prevention:   safety round/check completed   room  organization consistent   nonskid shoes/slippers when out of bed   assistive device/personal items within reach   clutter free environment maintained   fall prevention program maintained  Taken 6/10/2025 0755 by Bernie Eric RN  Safety Promotion/Fall Prevention:   safety round/check completed   room organization consistent   nonskid shoes/slippers when out of bed   fall prevention program maintained   clutter free environment maintained   assistive device/personal items within reach   activity supervised  Intervention: Prevent Skin Injury  Recent Flowsheet Documentation  Taken 6/10/2025 1800 by Bernie Eric RN  Body Position:   position changed independently   sitting up in bed   supine  Skin Protection:   incontinence pads utilized   skin sealant/moisture barrier applied   silicone foam dressing in place  Taken 6/10/2025 1600 by Bernie Eric RN  Body Position:   position changed independently   neutral head position   neutral body alignment   turned   right  Skin Protection:   incontinence pads utilized   skin sealant/moisture barrier applied   silicone foam dressing in place  Taken 6/10/2025 1400 by Bernie Eric RN  Body Position:   position changed independently   sitting up in bed  Skin Protection:   incontinence pads utilized   skin sealant/moisture barrier applied   silicone foam dressing in place  Taken 6/10/2025 1200 by Bernie Eric RN  Body Position:   position changed independently   sitting up in bed  Skin Protection:   incontinence pads utilized   skin sealant/moisture barrier applied   silicone foam dressing in place  Taken 6/10/2025 1000 by Bernie Eric RN  Body Position:   position changed independently   sitting up in bed  Skin Protection:   incontinence pads utilized   skin sealant/moisture barrier applied   silicone foam dressing in place  Taken 6/10/2025 0755 by Bernie Eric RN  Body Position:   position changed independently   sitting up in bed  Intervention: Prevent  Infection  Recent Flowsheet Documentation  Taken 6/10/2025 1800 by Bernie Eric RN  Infection Prevention:   environmental surveillance performed   equipment surfaces disinfected   hand hygiene promoted   single patient room provided  Taken 6/10/2025 1600 by Bernie Eric RN  Infection Prevention:   environmental surveillance performed   equipment surfaces disinfected   hand hygiene promoted   single patient room provided  Taken 6/10/2025 1400 by Bernie Eric RN  Infection Prevention:   environmental surveillance performed   equipment surfaces disinfected   hand hygiene promoted   single patient room provided  Taken 6/10/2025 1200 by Bernie Eric RN  Infection Prevention:   environmental surveillance performed   equipment surfaces disinfected   hand hygiene promoted   single patient room provided  Taken 6/10/2025 1000 by Bernie Eric RN  Infection Prevention:   environmental surveillance performed   equipment surfaces disinfected   hand hygiene promoted   single patient room provided  Taken 6/10/2025 0755 by Bernie Eric RN  Infection Prevention:   environmental surveillance performed   equipment surfaces disinfected   hand hygiene promoted   single patient room provided     Problem: Adult Inpatient Plan of Care  Goal: Absence of Hospital-Acquired Illness or Injury  Intervention: Identify and Manage Fall Risk  Recent Flowsheet Documentation  Taken 6/10/2025 1800 by Bernie Eric RN  Safety Promotion/Fall Prevention:   safety round/check completed   room organization consistent   nonskid shoes/slippers when out of bed   assistive device/personal items within reach   clutter free environment maintained   fall prevention program maintained  Taken 6/10/2025 1600 by Bernie Eric RN  Safety Promotion/Fall Prevention:   safety round/check completed   room organization consistent   nonskid shoes/slippers when out of bed   assistive device/personal items within reach   clutter free environment  maintained   fall prevention program maintained  Taken 6/10/2025 1400 by Bernie Eric, RN  Safety Promotion/Fall Prevention:   safety round/check completed   room organization consistent   nonskid shoes/slippers when out of bed   assistive device/personal items within reach   clutter free environment maintained   fall prevention program maintained  Taken 6/10/2025 1200 by Bernie Eric, RN  Safety Promotion/Fall Prevention:   safety round/check completed   room organization consistent   nonskid shoes/slippers when out of bed   assistive device/personal items within reach   clutter free environment maintained   fall prevention program maintained  Taken 6/10/2025 1000 by Bernie Eric, RN  Safety Promotion/Fall Prevention:   safety round/check completed   room organization consistent   nonskid shoes/slippers when out of bed   assistive device/personal items within reach   clutter free environment maintained   fall prevention program maintained  Taken 6/10/2025 0755 by Bernie Eric, RN  Safety Promotion/Fall Prevention:   safety round/check completed   room organization consistent   nonskid shoes/slippers when out of bed   fall prevention program maintained   clutter free environment maintained   assistive device/personal items within reach   activity supervised   Goal Outcome Evaluation:  Plan of Care Reviewed With: patient        Progress: improving

## 2025-06-10 NOTE — PLAN OF CARE
Problem: Adult Inpatient Plan of Care  Goal: Absence of Hospital-Acquired Illness or Injury  Intervention: Identify and Manage Fall Risk  Recent Flowsheet Documentation  Taken 6/10/2025 0600 by Rufina Guerrero RN  Safety Promotion/Fall Prevention:   activity supervised   assistive device/personal items within reach   clutter free environment maintained   fall prevention program maintained   lighting adjusted   nonskid shoes/slippers when out of bed   safety round/check completed   room organization consistent  Taken 6/10/2025 0400 by Rufina Guerrero RN  Safety Promotion/Fall Prevention:   activity supervised   assistive device/personal items within reach   clutter free environment maintained   fall prevention program maintained   lighting adjusted   nonskid shoes/slippers when out of bed   safety round/check completed   room organization consistent  Taken 6/10/2025 0200 by Rufina Guerrero RN  Safety Promotion/Fall Prevention:   activity supervised   assistive device/personal items within reach   clutter free environment maintained   fall prevention program maintained   lighting adjusted   nonskid shoes/slippers when out of bed   safety round/check completed   room organization consistent  Taken 6/10/2025 0000 by Rufina Guerrero RN  Safety Promotion/Fall Prevention:   activity supervised   assistive device/personal items within reach   clutter free environment maintained   fall prevention program maintained   lighting adjusted   nonskid shoes/slippers when out of bed   safety round/check completed   room organization consistent  Taken 6/9/2025 2200 by Rufina Guerrero RN  Safety Promotion/Fall Prevention:   activity supervised   assistive device/personal items within reach   clutter free environment maintained   fall prevention program maintained   lighting adjusted   nonskid shoes/slippers when out of bed   safety round/check completed   room organization consistent  Taken 6/9/2025 2000 by Rufina Guerrero  RN  Safety Promotion/Fall Prevention:   activity supervised   assistive device/personal items within reach   clutter free environment maintained   fall prevention program maintained   lighting adjusted   nonskid shoes/slippers when out of bed   safety round/check completed   room organization consistent  Intervention: Prevent Skin Injury  Recent Flowsheet Documentation  Taken 6/10/2025 0600 by Rufina Guerrero RN  Body Position: position changed independently  Skin Protection:   incontinence pads utilized   transparent dressing maintained   pulse oximeter probe site changed  Taken 6/10/2025 0400 by Rufina Guerrero RN  Body Position: position changed independently  Skin Protection:   incontinence pads utilized   transparent dressing maintained   pulse oximeter probe site changed  Taken 6/10/2025 0200 by Rufina Guerrero RN  Body Position: position changed independently  Skin Protection:   incontinence pads utilized   transparent dressing maintained   pulse oximeter probe site changed  Taken 6/10/2025 0000 by Rufina Guerrero RN  Body Position: position changed independently  Skin Protection:   incontinence pads utilized   transparent dressing maintained   pulse oximeter probe site changed  Taken 6/9/2025 2200 by Rufina Guerrero RN  Body Position: position changed independently  Skin Protection:   incontinence pads utilized   transparent dressing maintained   pulse oximeter probe site changed  Taken 6/9/2025 2000 by Rufina Guerrero RN  Body Position: sitting up in bed  Skin Protection:   incontinence pads utilized   transparent dressing maintained   pulse oximeter probe site changed  Intervention: Prevent and Manage VTE (Venous Thromboembolism) Risk  Recent Flowsheet Documentation  Taken 6/9/2025 2000 by Rufina Guerrero RN  VTE Prevention/Management:   bilateral   SCDs (sequential compression devices) off  Intervention: Prevent Infection  Recent Flowsheet Documentation  Taken 6/10/2025 0600 by Rufina Guerrero  RN  Infection Prevention:   single patient room provided   rest/sleep promoted  Taken 6/10/2025 0400 by Rufina Guerrero RN  Infection Prevention:   single patient room provided   rest/sleep promoted  Taken 6/10/2025 0200 by Rufina Guerrero RN  Infection Prevention:   single patient room provided   rest/sleep promoted  Taken 6/10/2025 0000 by Rufina Guerrero RN  Infection Prevention:   single patient room provided   rest/sleep promoted  Taken 6/9/2025 2200 by Rufina Guerrero RN  Infection Prevention:   single patient room provided   rest/sleep promoted  Taken 6/9/2025 2000 by Rufina Guerrero RN  Infection Prevention:   single patient room provided   rest/sleep promoted  Goal: Optimal Comfort and Wellbeing  Intervention: Provide Person-Centered Care  Recent Flowsheet Documentation  Taken 6/10/2025 0400 by Rufina Guerrero RN  Trust Relationship/Rapport: care explained  Taken 6/10/2025 0000 by Rufina Guerrero RN  Trust Relationship/Rapport: care explained  Taken 6/9/2025 2200 by Rufina Guerrero RN  Trust Relationship/Rapport: care explained  Taken 6/9/2025 2000 by Rufina Guerrero RN  Trust Relationship/Rapport:   care explained   choices provided   emotional support provided   empathic listening provided   questions answered   questions encouraged   reassurance provided   thoughts/feelings acknowledged     Problem: Comorbidity Management  Goal: Maintenance of COPD Symptom Control  Intervention: Maintain COPD (Chronic Obstructive Pulmonary Disease) Symptom Control  Recent Flowsheet Documentation  Taken 6/9/2025 2000 by Rufina Guerrero RN  Breathing Techniques/Airway Clearance: deep/controlled cough encouraged     Problem: Skin Injury Risk Increased  Goal: Skin Health and Integrity  Intervention: Optimize Skin Protection  Recent Flowsheet Documentation  Taken 6/10/2025 0600 by Rufina Guerrero RN  Activity Management: activity minimized  Pressure Reduction Techniques: frequent weight shift encouraged  Head of Bed (HOB)  Positioning: [facility] elevated  Pressure Reduction Devices:   pressure-redistributing mattress utilized   positioning supports utilized  Skin Protection:   incontinence pads utilized   transparent dressing maintained   pulse oximeter probe site changed  Taken 6/10/2025 0400 by Rufina Guerrero RN  Activity Management: activity encouraged  Pressure Reduction Techniques: frequent weight shift encouraged  Head of Bed (HOB) Positioning: [facility] elevated  Pressure Reduction Devices:   pressure-redistributing mattress utilized   positioning supports utilized  Skin Protection:   incontinence pads utilized   transparent dressing maintained   pulse oximeter probe site changed  Taken 6/10/2025 0200 by Rufina Guerrero RN  Activity Management: activity encouraged  Pressure Reduction Techniques: frequent weight shift encouraged  Head of Bed (HOB) Positioning: [facility] elevated  Pressure Reduction Devices:   pressure-redistributing mattress utilized   positioning supports utilized  Skin Protection:   incontinence pads utilized   transparent dressing maintained   pulse oximeter probe site changed  Taken 6/10/2025 0000 by Rufina Guerrero RN  Activity Management: back to bed  Pressure Reduction Techniques: frequent weight shift encouraged  Head of Bed (HOB) Positioning: [facility] elevated  Pressure Reduction Devices:   pressure-redistributing mattress utilized   positioning supports utilized  Skin Protection:   incontinence pads utilized   transparent dressing maintained   pulse oximeter probe site changed  Taken 6/9/2025 2349 by Rufina Guerrero RN  Activity Management: ambulated to bathroom  Taken 6/9/2025 2200 by Rufina Guerrero RN  Activity Management: activity encouraged  Pressure Reduction Techniques: frequent weight shift encouraged  Head of Bed (HOB) Positioning: [facility] elevated  Pressure Reduction Devices:   pressure-redistributing mattress utilized   positioning supports utilized  Skin Protection:   incontinence pads utilized   transparent  dressing maintained   pulse oximeter probe site changed  Taken 6/9/2025 2121 by Rufina Guerrero, RN  Activity Management: back to bed  Taken 6/9/2025 2106 by Rufina Guerrero RN  Activity Management: ambulated to bathroom  Taken 6/9/2025 2000 by Rufina Guerrero RN  Activity Management: activity encouraged  Pressure Reduction Techniques: frequent weight shift encouraged  Head of Bed (HOB) Positioning: HOB elevated  Pressure Reduction Devices:   pressure-redistributing mattress utilized   positioning supports utilized  Skin Protection:   incontinence pads utilized   transparent dressing maintained   pulse oximeter probe site changed  Taken 6/9/2025 1908 by Rufina Guerrero, RN  Activity Management: ambulated to bathroom     Problem: Fall Injury Risk  Goal: Absence of Fall and Fall-Related Injury  Intervention: Promote Injury-Free Environment  Recent Flowsheet Documentation  Taken 6/10/2025 0600 by Rufina Guerrero RN  Safety Promotion/Fall Prevention:   activity supervised   assistive device/personal items within reach   clutter free environment maintained   fall prevention program maintained   lighting adjusted   nonskid shoes/slippers when out of bed   safety round/check completed   room organization consistent  Taken 6/10/2025 0400 by Rufina Guerrero RN  Safety Promotion/Fall Prevention:   activity supervised   assistive device/personal items within reach   clutter free environment maintained   fall prevention program maintained   lighting adjusted   nonskid shoes/slippers when out of bed   safety round/check completed   room organization consistent  Taken 6/10/2025 0200 by Rufina Guerrero RN  Safety Promotion/Fall Prevention:   activity supervised   assistive device/personal items within reach   clutter free environment maintained   fall prevention program maintained   lighting adjusted   nonskid shoes/slippers when out of bed   safety round/check completed   room organization consistent  Taken 6/10/2025 0000 by  Walker, Rufina, RN  Safety Promotion/Fall Prevention:   activity supervised   assistive device/personal items within reach   clutter free environment maintained   fall prevention program maintained   lighting adjusted   nonskid shoes/slippers when out of bed   safety round/check completed   room organization consistent  Taken 6/9/2025 2200 by Rufina Guerrero RN  Safety Promotion/Fall Prevention:   activity supervised   assistive device/personal items within reach   clutter free environment maintained   fall prevention program maintained   lighting adjusted   nonskid shoes/slippers when out of bed   safety round/check completed   room organization consistent  Taken 6/9/2025 2000 by Rufina Guerrero RN  Safety Promotion/Fall Prevention:   activity supervised   assistive device/personal items within reach   clutter free environment maintained   fall prevention program maintained   lighting adjusted   nonskid shoes/slippers when out of bed   safety round/check completed   room organization consistent     Problem: COPD (Chronic Obstructive Pulmonary Disease) Exacerbation  Goal: Optimal Coping with Chronic Illness  Intervention: Support and Optimize Psychosocial Response  Recent Flowsheet Documentation  Taken 6/9/2025 2000 by Rufina Guerrero RN  Supportive Measures: active listening utilized  Goal: Optimal Level of Functional [place]  Intervention: Optimize Functional Ability  Recent Flowsheet Documentation  Taken 6/10/2025 0600 by Rufina Guerrero RN  Activity Management: activity minimized  Taken 6/10/2025 0400 by Rufina Guerrero RN  Activity Management: activity encouraged  Taken 6/10/2025 0200 by Rufina Guerrero RN  Activity Management: activity encouraged  Taken 6/10/2025 0000 by Rufina Guerrero RN  Activity Management: back to bed  Taken 6/9/2025 2349 by Rufina Guerrero, RN  Activity Management: ambulated to bathroom  Taken 6/9/2025 2200 by Rufina Guerrero RN  Activity Management: activity encouraged  Taken  6/9/2025 2121 by Rufina Guerrero RN  Activity Management: back to bed  Taken 6/9/2025 2106 by Rufina Guerrero RN  Activity Management: ambulated to bathroom  Taken 6/9/2025 2000 by Rufina Guerrero RN  Activity Management: activity encouraged  Taken 6/9/2025 1908 by Rufina Guerrero RN  Activity Management: ambulated to bathroom  Goal: Effective Oxygenation and Ventilation  Intervention: Promote Airway Secretion Clearance  Recent Flowsheet Documentation  Taken 6/10/2025 0600 by Rufina Guerrero RN  Activity Management: activity minimized  Taken 6/10/2025 0400 by Rufina Guerrero RN  Activity Management: activity encouraged  Taken 6/10/2025 0200 by Rufina Guerrero RN  Activity Management: activity encouraged  Taken 6/10/2025 0000 by Rufina Guerrero RN  Activity Management: back to bed  Taken 6/9/2025 2349 by Rufina Guerrero RN  Activity Management: ambulated to bathroom  Taken 6/9/2025 2200 by Rufina Guerrero RN  Activity Management: activity encouraged  Taken 6/9/2025 2121 by Rufina Guerrero RN  Activity Management: back to bed  Taken 6/9/2025 2106 by Rufina Guerrero RN  Activity Management: ambulated to bathroom  Taken 6/9/2025 2000 by Rufina Guerrero RN  Activity Management: activity encouraged  Breathing Techniques/Airway Clearance: deep/controlled cough encouraged  Cough And Deep Breathing: done with encouragement  Taken 6/9/2025 1908 by Rufina Guerrero RN  Activity Management: ambulated to bathroom  Intervention: Optimize Oxygenation and Ventilation  Recent Flowsheet Documentation  Taken 6/10/2025 0600 by Rufina Guerrero RN  Head of Bed (HOB) Positioning: HOB elevated  Taken 6/10/2025 0400 by Rufina Guerrero RN  Head of Bed (HOB) Positioning: HOB elevated  Taken 6/10/2025 0200 by Rufina Guerrero RN  Head of Bed (HOB) Positioning: HOB elevated  Taken 6/10/2025 0000 by Rufina Guerrero RN  Head of Bed (HOB) Positioning: HOB elevated  Taken 6/9/2025 2200 by Rufina Guerrero RN  Head of Bed (HOB) Positioning: HOB  elevated  Taken 6/9/2025 2000 by Rufina Guerrero, RN  Head of Bed (HOB) Positioning: HOB elevated   Goal Outcome Evaluation:

## 2025-06-10 NOTE — PLAN OF CARE
Goal Outcome Evaluation:  Plan of Care Reviewed With: (P) patient           Outcome Evaluation: (P) Patient not appropriate for skilled in patient wound care at this time due to controlled symptoms. No LE swelling present today, however skin was mildly dry upon inspection, and pateint was encouraged to continue using cream on a daily basis. Education on compression wraping for home provided. D/C PT recomended today.    Anticipated Discharge Disposition (PT): (P) other (see comments) (will defer to mobility PT)

## 2025-06-10 NOTE — PROGRESS NOTES
[facility] Medicine Services  PROGRESS NOTE    Patient Name: Elvi Keita  : 1955  MRN: [id number]    Date of Admission: 2025  Primary Care Physician: Provider, No Known    Subjective   Subjective     CC:  Short of breath    HPI:  Feels like breathing is slightly better than yesterday.  Was still requiring 5 to 6 L nasal cannula overnight.  Attempting to wean this morning when seen.  At 4.5 L nasal cannula.  Still with cough, improving      Objective   Objective     Vital Signs:   Temp:  [97 °F (36.1 °C)-98.4 °F (36.9 °C)] 97.8 °F (36.6 °C)  Heart Rate:  [54-72] 66  Resp:  [17-20] 18  BP: (132-201)/(59-82) 132/59  Flow (L/min) (Oxygen Therapy):  [5-6] 6     Physical Exam:  Constitutional: No acute distress, awake, alert  HENT: NCAT, mucous membranes moist  Respiratory: Unlabored, 4.5 L nasal cannula, Rales bilaterally and diffuse expiratory wheezes  Cardiovascular: RRR, no murmurs, rubs, or gallops  Gastrointestinal: Positive bowel sounds, soft, nontender, nondistended  Musculoskeletal: No bilateral ankle edema  Psychiatric: Appropriate affect, cooperative  Neurologic: Oriented x 3, strength symmetric in all extremities, Cranial Nerves grossly intact to confrontation, speech clear  Skin: No rashes        Results Reviewed:  LAB RESULTS:      Lab 25  0343 06/07/25  2225 06/07/25  2105   WBC 6.72  --  8.44   HEMOGLOBIN 15.9  --  17.4*   HEMATOCRIT 50.1*  --  54.4*   PLATELETS 146  --  153   NEUTROS ABS 6.24  --  6.44   IMMATURE GRANS (ABS) 0.02  --  0.03   LYMPHS ABS 0.42*  --  1.34   MONOS ABS 0.03*  --  0.53   EOS ABS 0.00  --  0.07   .0*  --  100.0*   PROCALCITONIN  --   --  0.07   LDH  --   --  213   D DIMER QUANT  --   --  0.60   HSTROP T 27* 31* 28*         Lab 06/10/25  0335 25  0343 06/07/25  210   SODIUM 135* 135* 137   POTASSIUM 4.4 4.2 4.6   CHLORIDE 94* 94* 94*   CO2 36.0* 32.0* 33.0*   ANION GAP 5.0 9.0 10.0   BUN 15.1 10.6 9.0   CREATININE 0.65 0.76  0.82   EGFR 94.9 84.4 77.1   GLUCOSE 88 155* 133*   CALCIUM 8.7 8.7 9.4   MAGNESIUM  --  1.5*  --          Lab 06/07/25  2105   TOTAL PROTEIN 6.6   ALBUMIN 3.9   GLOBULIN 2.7   ALT (SGPT) 12   AST (SGOT) 14   BILIRUBIN 0.4   ALK PHOS 100         Lab 06/08/25  0343 06/07/25  2225 06/07/25  2105   PROBNP  --   --  1,097.0*   HSTROP T 27* 31* 28*                 Lab 06/07/25  2127   PH, ARTERIAL 7.382   PCO2, ARTERIAL 60.4*   PO2 .0*   FIO2 50   HCO3 ART 35.9*   BASE EXCESS ART 7.9*   CARBOXYHEMOGLOBIN 5.5*     Brief Urine Lab Results  (Last result in the past 365 days)        Color   Clarity   Blood   Leuk Est   Nitrite   Protein   CREAT   Urine HCG        10/14/24 1518 Yellow   Clear   Negative   Negative   Negative   Negative                   Microbiology Results Abnormal       None            No radiology results from the last 24 hrs      Results for orders placed during the hospital encounter of 08/30/24    Adult Transthoracic Echo Complete W/ Cont if Necessary Per Protocol    Interpretation Summary    Left ventricular ejection fraction appears to be greater than 70%.    Left ventricular wall thickness is consistent with mild posterior asymmetric hypertrophy.    The right ventricular cavity is mild to moderately dilated.    The left atrial cavity is mildly dilated.    Left atrial volume is mildly increased.    The right atrial cavity is moderately  dilated.    Estimated right ventricular systolic pressure from tricuspid regurgitation is normal (<35 mmHg).      Current medications:  Scheduled Meds:arformoterol, 15 mcg, Nebulization, BID - RT   And  budesonide, 0.5 mg, Nebulization, BID - RT   And  revefenacin, 175 mcg, Nebulization, Daily - RT  bisoprolol, 10 mg, Oral, Daily   And  hydroCHLOROthiazide, 12.5 mg, Oral, Daily  cefuroxime, 750 mg, Oral, TID  doxycycline, 100 mg, Oral, BID  gabapentin, 800 mg, Oral, Q8H  guaiFENesin, 1,200 mg, Oral, Q12H  heparin (porcine), 5,000 Units, Subcutaneous,  Q8H  Hydrocod Yanick-Chlorphe Yanick ER, 5 mL, Oral, Q12H  nicotine, 1 patch, Transdermal, Q24H  pantoprazole, 40 mg, Oral, Q AM  [START ON 6/11/2025] predniSONE, 40 mg, Oral, Daily With Breakfast  sertraline, 50 mg, Oral, Daily  sodium chloride, 10 mL, Intravenous, Q12H  traZODone, 100 mg, Oral, Nightly      Continuous Infusions:   PRN Meds:.  acetaminophen **OR** acetaminophen **OR** acetaminophen    ammonium lactate    senna-docusate sodium **AND** polyethylene glycol **AND** bisacodyl **AND** bisacodyl    hydrOXYzine    ipratropium-albuterol    ketorolac    nicotine polacrilex    nitroglycerin    ondansetron ODT **OR** ondansetron    sodium chloride    sodium chloride    Assessment & Plan   Assessment & Plan     Active Hospital Problems    Diagnosis  POA    **COPD exacerbation [J44.1]  Yes    Peripheral polyneuropathy [G62.9]  Unknown    Lower extremity venous stasis [I87.8]  Unknown    Mood disorder [F39]  Unknown    Essential hypertension [I10]  Yes    Acute on chronic respiratory failure with hypoxia and hypercapnia [J96.21, J96.22]  Yes    Nicotine dependence [F17.200]  Yes      Resolved Hospital Problems   No resolved problems to display.        Brief Hospital Course to date:  Elvi Keita is a 70 y.o. female with past medical history of chronic respiratory failure, COPD, hypertension, neuropathy, venous stasis and mood disorder who presented with worsening respiratory failure and acute exacerbation of her COPD.    This patient's problems and plans were partially entered by my partner and updated as appropriate by me 06/10/25.      COPD exacerbation  A/C respiratory failure w/ hypoxia / hypercapnia  Pleurisy  - Wean O2 as able  - yupelri / brovana /pulmicort nebs scheduled  - duo nebs PRN  - methylprednisolone 62.5 mg IV daily-changed to prednisone  - unasyn / doxy changed to oral Ceftin/doxycycline, day 3  - pulmonary toilet / flutter valve / IS  - wean oxygen as tolerated-currently 4.5 L nasal cannula  -  Continue mucinex  - hold azithromycin, taking daily prophylactic dose  -Continue Toradol every 6 as needed     Elevated BNP / Elevated troponin / HTN  - denies chest pain  - troponin flat 28-31-27  - EKG normal sinus rhythm  - continue bisoprolol / hctz  - ECHO 8/2024 w/ EF 70%     BLE venous stasis  - WOC evaluation  - AmLactin  - has upcoming evaluation per daughter by a vein specialist     Nicotine dependence  - nicotine patch  - smoking cessation education        Expected Discharge Location and Transportation: Home tomorrow if stable  Expected Discharge   Expected Discharge Date: 6/11/2025; Expected Discharge Time:      VTE Prophylaxis:  Pharmacologic VTE prophylaxis orders are present.         AM-PAC 6 Clicks Score (PT): 22 (06/10/25 1410)    CODE STATUS:   Code Status and Medical Interventions: CPR (Attempt to Resuscitate); Full Support   Ordered at: 06/07/25 3766     Code Status (Patient has no pulse and is not breathing):    CPR (Attempt to Resuscitate)     Medical Interventions (Patient has pulse or is breathing):    Full Support       Margret Lau, APRCHRISTINE  06/10/25

## 2025-06-11 ENCOUNTER — READMISSION MANAGEMENT (OUTPATIENT)
Dept: CALL CENTER | Facility: HOSPITAL | Age: 70
End: 2025-06-11
Payer: MEDICARE

## 2025-06-11 VITALS
RESPIRATION RATE: 18 BRPM | TEMPERATURE: 98 F | DIASTOLIC BLOOD PRESSURE: 54 MMHG | HEIGHT: 64 IN | WEIGHT: 161.8 LBS | HEART RATE: 56 BPM | BODY MASS INDEX: 27.62 KG/M2 | OXYGEN SATURATION: 90 % | SYSTOLIC BLOOD PRESSURE: 144 MMHG

## 2025-06-11 PROBLEM — J44.1 COPD EXACERBATION: Status: RESOLVED | Noted: 2025-06-07 | Resolved: 2025-06-11

## 2025-06-11 PROCEDURE — 97530 THERAPEUTIC ACTIVITIES: CPT

## 2025-06-11 PROCEDURE — 99239 HOSP IP/OBS DSCHRG MGMT >30: CPT | Performed by: NURSE PRACTITIONER

## 2025-06-11 PROCEDURE — 94799 UNLISTED PULMONARY SVC/PX: CPT

## 2025-06-11 PROCEDURE — 97164 PT RE-EVAL EST PLAN CARE: CPT

## 2025-06-11 PROCEDURE — 94761 N-INVAS EAR/PLS OXIMETRY MLT: CPT

## 2025-06-11 PROCEDURE — 94664 DEMO&/EVAL PT USE INHALER: CPT

## 2025-06-11 PROCEDURE — 63710000001 PREDNISONE PER 1 MG: Performed by: NURSE PRACTITIONER

## 2025-06-11 PROCEDURE — 25010000002 HEPARIN (PORCINE) PER 1000 UNITS: Performed by: NURSE PRACTITIONER

## 2025-06-11 PROCEDURE — 63710000001 REVEFENACIN 175 MCG/3ML SOLUTION: Performed by: NURSE PRACTITIONER

## 2025-06-11 RX ORDER — HYDROCHLOROTHIAZIDE 12.5 MG/1
12.5 TABLET ORAL DAILY
Qty: 30 TABLET | Refills: 0 | Status: SHIPPED | OUTPATIENT
Start: 2025-06-12

## 2025-06-11 RX ORDER — CEFUROXIME AXETIL 250 MG/1
750 TABLET ORAL 3 TIMES DAILY
Qty: 15 TABLET | Refills: 0 | Status: SHIPPED | OUTPATIENT
Start: 2025-06-11 | End: 2025-06-13

## 2025-06-11 RX ORDER — AMLODIPINE BESYLATE 5 MG/1
5 TABLET ORAL
Status: DISCONTINUED | OUTPATIENT
Start: 2025-06-11 | End: 2025-06-11 | Stop reason: HOSPADM

## 2025-06-11 RX ORDER — AMLODIPINE BESYLATE 5 MG/1
5 TABLET ORAL
Qty: 30 TABLET | Refills: 0 | Status: SHIPPED | OUTPATIENT
Start: 2025-06-12

## 2025-06-11 RX ORDER — ACETAMINOPHEN 325 MG/1
650 TABLET ORAL EVERY 4 HOURS PRN
Start: 2025-06-11

## 2025-06-11 RX ORDER — DOXYCYCLINE 100 MG/1
100 CAPSULE ORAL 2 TIMES DAILY
Qty: 3 CAPSULE | Refills: 0 | Status: SHIPPED | OUTPATIENT
Start: 2025-06-11 | End: 2025-06-13

## 2025-06-11 RX ORDER — PREDNISONE 20 MG/1
40 TABLET ORAL
Qty: 8 TABLET | Refills: 0 | Status: SHIPPED | OUTPATIENT
Start: 2025-06-12 | End: 2025-06-16

## 2025-06-11 RX ADMIN — Medication 10 ML: at 08:23

## 2025-06-11 RX ADMIN — ARFORMOTEROL TARTRATE 15 MCG: 15 SOLUTION RESPIRATORY (INHALATION) at 09:50

## 2025-06-11 RX ADMIN — GABAPENTIN 800 MG: 400 CAPSULE ORAL at 05:28

## 2025-06-11 RX ADMIN — SERTRALINE HYDROCHLORIDE 50 MG: 50 TABLET ORAL at 08:23

## 2025-06-11 RX ADMIN — PREDNISONE 40 MG: 20 TABLET ORAL at 08:21

## 2025-06-11 RX ADMIN — BUDESONIDE 0.5 MG: 0.5 SUSPENSION RESPIRATORY (INHALATION) at 09:50

## 2025-06-11 RX ADMIN — CEFUROXIME AXETIL 750 MG: 250 TABLET, FILM COATED ORAL at 08:21

## 2025-06-11 RX ADMIN — HYDROCHLOROTHIAZIDE 12.5 MG: 25 TABLET ORAL at 08:22

## 2025-06-11 RX ADMIN — HYDROCODONE POLISTIREX AND CHLORPHENIRAMINE POLISTIREX 5 ML: 10; 8 SUSPENSION, EXTENDED RELEASE ORAL at 11:56

## 2025-06-11 RX ADMIN — BISOPROLOL FUMARATE 10 MG: 5 TABLET, FILM COATED ORAL at 08:22

## 2025-06-11 RX ADMIN — REVEFENACIN 175 MCG: 175 SOLUTION RESPIRATORY (INHALATION) at 09:50

## 2025-06-11 RX ADMIN — DOXYCYCLINE 100 MG: 100 CAPSULE ORAL at 08:22

## 2025-06-11 RX ADMIN — AMLODIPINE BESYLATE 5 MG: 5 TABLET ORAL at 08:23

## 2025-06-11 RX ADMIN — PANTOPRAZOLE SODIUM 40 MG: 40 TABLET, DELAYED RELEASE ORAL at 05:28

## 2025-06-11 RX ADMIN — HEPARIN SODIUM 5000 UNITS: 5000 INJECTION INTRAVENOUS; SUBCUTANEOUS at 05:28

## 2025-06-11 RX ADMIN — GUAIFENESIN 1200 MG: 600 TABLET, EXTENDED RELEASE ORAL at 08:22

## 2025-06-11 NOTE — THERAPY RE-EVALUATION
Patient Name: Elvi Keita  : 1955    MRN: [id number]                              Today's Date: 2025       Admit Date: 2025    Visit Dx:     ICD-10-CM ICD-9-CM   1. Acute exacerbation of chronic obstructive pulmonary disease (COPD)  J44.1 491.21   2. Acute on chronic respiratory failure with hypoxia and hypercapnia  J96.21 518.84    J96.22 786.09     799.02   3. Smoker  F17.200 305.1   4. History of hypertension  Z86.79 V12.59     Patient Active Problem List   Diagnosis    Chronic hypoxemic respiratory failure    Cor pulmonale    Generalized osteoarthritis    Hyperlipidemia    Lumbar spondylosis    Nicotine dependence    Essential hypertension    Acute on chronic respiratory failure with hypoxia and hypercapnia    COPD exacerbation    Peripheral polyneuropathy    Lower extremity venous stasis    Mood disorder     Past Medical History:   Diagnosis Date    COPD (chronic obstructive pulmonary disease)     Hypertension      Past Surgical History:   Procedure Laterality Date    LAPAROSCOPIC TUBAL LIGATION      TOE SURGERY        General Information       Row Name 25 0826          Physical Therapy Time and Intention    Document Type discharge evaluation/summary  -ML     Mode of Treatment physical therapy  -ML       Row Name 25 0826          General Information    Patient Profile Reviewed yes  -ML     Prior Level of Function independent:;all household mobility;gait;transfer;bed mobility;ADL's;dependent:;driving;shopping  patient on 3 LPM at home, uses shower chair, independent household ambulation  -ML     Existing Precautions/Restrictions fall;oxygen therapy device and L/min  -ML     Barriers to Rehab medically complex;previous functional deficit  -ML       Row Name 25 0826          Living Environment    Current Living Arrangements home  -ML     People in Home spouse  -ML       Row Name 25 0826          Home Main Entrance    Number of Stairs, Main Entrance one  -ML       Row Name  06/11/25 0826          Stairs Within Home, Primary    Number of Stairs, Within Home, Primary none  -ML       Row Name 06/11/25 0826          Cognition    Orientation Status (Cognition) oriented x 3  -ML       Row Name 06/11/25 0826          Safety Issues/Impairments Affecting Functional Mobility    Safety Issues Affecting Function (Mobility) insight into deficits/self-awareness;safety precaution awareness  -ML     Impairments Affecting Function (Mobility) endurance/activity tolerance;shortness of breath;pain  -ML               User Key  (r) = Recorded By, (t) = Taken By, (c) = Cosigned By      Initials Name Provider Type    ML Kelley Funes Physical Therapist                   Mobility       Row Name 06/11/25 0827          Bed Mobility    Bed Mobility supine-sit  -ML     Supine-Sit Gates (Bed Mobility) modified independence  -ML     Assistive Device (Bed Mobility) head of bed elevated  -ML       Row Name 06/11/25 0827          Sit-Stand Transfer    Sit-Stand Gates (Transfers) independent  -ML       Row Name 06/11/25 0827          Gait/Stairs (Locomotion)    Gates Level (Gait) standby assist  -ML     Assistive Device (Gait) other (see comments)  no AD  -ML     Distance in Feet (Gait) 50  -ML     Deviations/Abnormal Patterns (Gait) alexis decreased;gait speed decreased  -ML     Comment, (Gait/Stairs) Patient demonstrates step through gait pattern, no loss of balance, decreased gait speed throughout. Patient cued for pursed lip breathing.  -ML               User Key  (r) = Recorded By, (t) = Taken By, (c) = Cosigned By      Initials Name Provider Type    Kelley Cody Physical Therapist                   Obj/Interventions       Row Name 06/11/25 0829          Range of Motion Comprehensive    General Range of Motion bilateral lower extremity ROM WFL  -ML       Row Name 06/11/25 0829          Strength Comprehensive (MMT)    General Manual Muscle Testing (MMT) Assessment lower extremity strength  deficits identified  -ML     Comment, General Manual Muscle Testing (MMT) Assessment BLE grossly 4/5  -ML       Row Name 06/11/25 0829          Balance    Balance Assessment sitting static balance;sitting dynamic balance;standing static balance;standing dynamic balance  -ML     Static Sitting Balance independent  -ML     Dynamic Sitting Balance independent  -ML     Position, Sitting Balance unsupported;sitting edge of bed  -ML     Static Standing Balance independent  -ML     Dynamic Standing Balance standby assist  -ML     Position/Device Used, Standing Balance unsupported  -ML     Balance Interventions sitting;standing;sit to stand;occupation based/functional task  -       Row Name 06/11/25 0829          Sensory Assessment (Somatosensory)    Sensory Assessment (Somatosensory) LE sensation intact  -ML               User Key  (r) = Recorded By, (t) = Taken By, (c) = Cosigned By      Initials Name Provider Type    ML Kelley Funes Physical Therapist                   Goals/Plan    No documentation.                  Clinical Impression       Row Name 06/11/25 0830          Pain    Pretreatment Pain Rating 8/10  -ML     Posttreatment Pain Rating 8/10  -ML     Pain Location back  -ML     Pain Side/Orientation right;lateral  -ML     Pain Management Interventions exercise or physical activity utilized;positioning techniques utilized  -     Response to Pain Interventions no change per patient report  -       Row Name 06/11/25 0830          Plan of Care Review    Plan of Care Reviewed With patient  -     Outcome Evaluation Physical therapy mobility evaluation complete. The patient required SBA to ambulate without AD, decreased gait speed throughout. Patient educated on energy conservation strategies, verbalized understanding. Patient presents near baseline for mobility, no acute PT needs identified.  -       Row Name 06/11/25 0830          Therapy Assessment/Plan (PT)    Criteria for Skilled Interventions Met (PT)  no  -ML     Therapy Frequency (PT) evaluation only  -ML       Row Name 06/11/25 0830          Vital Signs    Pre SpO2 (%) 93  -ML     O2 Delivery Pre Treatment nasal cannula  -ML     Intra SpO2 (%) 88  -ML     O2 Delivery Intra Treatment nasal cannula  -ML     Post SpO2 (%) 91  -ML     O2 Delivery Post Treatment nasal cannula  -ML     Pre Patient Position Supine  -ML     Intra Patient Position Standing  -ML     Post Patient Position Sitting  -ML       Row Name 06/11/25 0830          Positioning and Restraints    Pre-Treatment Position in bed  -ML     Post Treatment Position chair  -ML     In Chair notified nsg;sitting;call light within reach;encouraged to call for assist;waffle cushion;with nsg  chair alarm status unchanged  -ML               User Key  (r) = Recorded By, (t) = Taken By, (c) = Cosigned By      Initials Name Provider Type    Kelley Cody Physical Therapist                   Outcome Measures       Row Name 06/11/25 0833          How much help from another person do you currently need...    Turning from your back to your side while in flat bed without using bedrails? 4  -ML     Moving from lying on back to sitting on the side of a flat bed without bedrails? 4  -ML     Moving to and from a bed to a chair (including a wheelchair)? 4  -ML     Standing up from a chair using your arms (e.g., wheelchair, bedside chair)? 4  -ML     Climbing 3-5 steps with a railing? 3  -ML     To walk in hospital room? 4  -ML     AM-PAC 6 Clicks Score (PT) 23  -ML     Highest Level of Mobility Goal Walk 25 Feet or More-7  -ML       Row Name 06/11/25 0833          Functional Assessment    Outcome Measure Options AM-PAC 6 Clicks Basic Mobility (PT)  -ML               User Key  (r) = Recorded By, (t) = Taken By, (c) = Cosigned By      Initials Name Provider Type    Kelley Cody Physical Therapist                                 Physical Therapy Education       Title: PT OT SLP Therapies (In Progress)       Topic: Physical  Therapy (In Progress)       Point: Mobility training (Done)       Learning Progress Summary            Patient Acceptance, E, VU by  at 6/11/2025 0834    Comment: home oxygen management, energy conservation strategies                      Point: Home exercise program (Not Started)       Learner Progress:  Not documented in this visit.              Point: Body mechanics (Done)       Learning Progress Summary            Patient Acceptance, E, VU by ML at 6/11/2025 0834    Comment: home oxygen management, energy conservation strategies                      Point: Precautions (Done)       Learning Progress Summary            Patient Acceptance, E, VU by  at 6/11/2025 0834    Comment: home oxygen management, energy conservation strategies                                      User Key       Initials Effective Dates Name Provider Type Discipline     04/22/21 -  Kelley Funes Physical Therapist PT                  PT Recommendation and Plan     Outcome Evaluation: Physical therapy mobility evaluation complete. The patient required SBA to ambulate without AD, decreased gait speed throughout. Patient educated on energy conservation strategies, verbalized understanding. Patient presents near baseline for mobility, no acute PT needs identified.     Time Calculation:         PT Charges       Row Name 06/11/25 0835             Time Calculation    Start Time 0755  -ML      PT Received On 06/11/25  -ML         Timed Charges    18860 - PT Therapeutic Activity Minutes 23  -ML         Untimed Charges    PT Eval/Re-eval Minutes 20  -ML         Total Minutes    Timed Charges Total Minutes 23  -ML      Untimed Charges Total Minutes 20  -ML       Total Minutes 43  -ML                User Key  (r) = Recorded By, (t) = Taken By, (c) = Cosigned By      Initials Name Provider Type     Kelley Funes Physical Therapist                  Therapy Charges for Today       Code Description Service Date Service Provider Modifiers Qty     80689343755 HC PT THERAPEUTIC ACT EA 15 MIN 6/11/2025 Kelley Funes GP 2    80237436231 HC PT RE-EVAL ESTABLISHED PLAN 2 6/11/2025 Kelley Funes GP 1            PT G-Codes  Outcome Measure Options: AM-PAC 6 Clicks Basic Mobility (PT)  AM-PAC 6 Clicks Score (PT): 23  PT Discharge Summary  Anticipated Discharge Disposition (PT): home with assist  Reason for Discharge: Independent, At baseline function    Kelley Funes  6/11/2025

## 2025-06-11 NOTE — DISCHARGE SUMMARY
[facility] Medicine Services  DISCHARGE SUMMARY    Patient Name: Elvi Keita  : 1955  MRN: [id number]    Date of Admission: 2025  8:48 PM  Date of Discharge: 2025  Primary Care Physician: Provider, No Known    Consults       No orders found from 2025 to 2025.            Hospital Course     Presenting Problem: SOA    Active Hospital Problems    Diagnosis  POA    Peripheral polyneuropathy [G62.9]  Unknown    Lower extremity venous stasis [I87.8]  Unknown    Mood disorder [F39]  Unknown    Essential hypertension [I10]  Yes    Acute on chronic respiratory failure with hypoxia and hypercapnia [J96.21, J96.22]  Yes    Nicotine dependence [F17.200]  Yes      Resolved Hospital Problems    Diagnosis Date Resolved POA    **COPD exacerbation [J44.1] 2025 Yes          Hospital Course:  Elvi Keita is a 70 y.o. female with past medical history of chronic respiratory failure, COPD, hypertension, neuropathy, venous stasis and mood disorder who presented with worsening respiratory failure and acute exacerbation of her COPD.    COPD exacerbation  A/C respiratory failure w/ hypoxia / hypercapnia  Pleurisy  - Wean O2 as able-patient baseline O2 is 3 L, currently at 4L  - yupelri / brovana /pulmicort nebs scheduled-resume home respiratory regimen at discharge  - Continue prednisone burst at discharge, 5-day total  - unasyn / doxy changed to oral Ceftin/doxycycline, day 4/5-complete at home upon discharge  - pulmonary toilet / flutter valve / IS  - hold azithromycin, taking daily prophylactic dose-can resume home dosing after completion of Ceftin/doxycycline  - Diclofenac every 12 hours for the next few days for pleuritic chest pain.  Continue PPI with     Elevated BNP / Elevated troponin / HTN  - denies chest pain  - troponin flat   - EKG normal sinus rhythm  - continue bisoprolol /hctz  -HCTZ increased and amlodipine added for discharge  - ECHO 2024 w/ EF 70%     BLE venous  stasis  - WOC evaluation  - AmLactin inpatient  - has upcoming evaluation per daughter by a vein specialist     Nicotine dependence  - nicotine patch provided  - smoking cessation education      Discharge Follow Up Recommendations for outpatient labs/diagnostics:  PCP follow-up 1 week, BP monitoring    Day of Discharge     HPI:   Still with some right sided pleuritic chest discomfort intermittently.  Medication helps.  Denies shortness of breath.  Cough improved     Review of Systems  Gen- No fevers, chills  CV- No chest pain, palpitations  Resp- No cough, dyspnea  GI- No N/V/D, abd pain      Vital Signs:   Temp:  [97.6 °F (36.4 °C)-98.8 °F (37.1 °C)] 97.6 °F (36.4 °C)  Heart Rate:  [56-72] 70  Resp:  [17-18] 18  BP: (161-176)/(63-74) 176/68  Flow (L/min) (Oxygen Therapy):  [4] 4      Physical Exam:  Constitutional: No acute distress, awake, alert  HENT: NCAT, mucous membranes moist  Respiratory: Clear to auscultation bilaterally, respiratory effort normal   Cardiovascular: RRR, no murmurs, rubs, or gallops  Gastrointestinal: Positive bowel sounds, soft, nontender, nondistended  Musculoskeletal: No bilateral ankle edema  Psychiatric: Appropriate affect, cooperative  Neurologic: Oriented x 3, strength symmetric in all extremities, Cranial Nerves grossly intact to confrontation, speech clear  Skin: No rashes      Pertinent  and/or Most Recent Results     LAB RESULTS:      Lab 06/08/25  0343 06/07/25  2105   WBC 6.72 8.44   HEMOGLOBIN 15.9 17.4*   HEMATOCRIT 50.1* 54.4*   PLATELETS 146 153   NEUTROS ABS 6.24 6.44   IMMATURE GRANS (ABS) 0.02 0.03   LYMPHS ABS 0.42* 1.34   MONOS ABS 0.03* 0.53   EOS ABS 0.00 0.07   .0* 100.0*   PROCALCITONIN  --  0.07   LDH  --  213   D DIMER QUANT  --  0.60         Lab 06/10/25  0335 06/08/25  0343 06/07/25  2105   SODIUM 135* 135* 137   POTASSIUM 4.4 4.2 4.6   CHLORIDE 94* 94* 94*   CO2 36.0* 32.0* 33.0*   ANION GAP 5.0 9.0 10.0   BUN 15.1 10.6 9.0   CREATININE 0.65 0.76 0.82    EGFR 94.9 84.4 77.1   GLUCOSE 88 155* 133*   CALCIUM 8.7 8.7 9.4   MAGNESIUM  --  1.5*  --          Lab 06/07/25 2105   TOTAL PROTEIN 6.6   ALBUMIN 3.9   GLOBULIN 2.7   ALT (SGPT) 12   AST (SGOT) 14   BILIRUBIN 0.4   ALK PHOS 100         Lab 06/08/25  0343 06/07/25  2225 06/07/25 2105   PROBNP  --   --  1,097.0*   HSTROP T 27* 31* 28*                 Lab 06/07/25 2127   PH, ARTERIAL 7.382   PCO2, ARTERIAL 60.4*   PO2 .0*   FIO2 50   HCO3 ART 35.9*   BASE EXCESS ART 7.9*   CARBOXYHEMOGLOBIN 5.5*     Brief Urine Lab Results  (Last result in the past 365 days)        Color   Clarity   Blood   Leuk Est   Nitrite   Protein   CREAT   Urine HCG        10/14/24 1518 Yellow   Clear   Negative   Negative   Negative   Negative                 Microbiology Results (last 10 days)       Procedure Component Value - Date/Time    COVID PRE-OP / PRE-PROCEDURE SCREENING ORDER (NO ISOLATION) - Swab, Nasopharynx [[id number]]  (Normal) Collected: 06/07/25 2107    Lab Status: Final result Specimen: Swab from Nasopharynx Updated: 06/07/25 2137    Narrative:      The following orders were created for panel order COVID PRE-OP / PRE-PROCEDURE SCREENING ORDER (NO ISOLATION) - Swab, Nasopharynx.  Procedure                               Abnormality         Status                     ---------                               -----------         ------                     COVID-19 and FLU A/B PCR...[[id number]]  Normal              Final result                 Please view results for these tests on the individual orders.    COVID-19 and FLU A/B PCR, 1 HR TAT - Swab, Nasopharynx [[id number]]  (Normal) Collected: 06/07/25 2107    Lab Status: Final result Specimen: Swab from Nasopharynx Updated: 06/07/25 2137     COVID19 Not Detected     Influenza A PCR Not Detected     Influenza B PCR Not Detected    Narrative:      Fact sheet for providers: https://www.fda.gov/media/803868/download    Fact sheet for patients:  https://www.fda.gov/media/390917/download    Test performed by PCR.            XR Chest 1 View  Result Date: 6/7/2025  XR CHEST 1 VW Date of Exam: 6/7/2025 9:03 PM EDT Indication: sob Comparison: 5/21/2025. Findings: Stable chronic interstitial disease and emphysema. No new lung opacity. No pneumothorax or pleural effusion. Heart size and pulmonary vasculature appear within normal limits. No acute osseous abnormalities.     Impression: Chronic interstitial disease and emphysema without superimposed acute process of the chest. Electronically Signed: Claude oJn MD  6/7/2025 9:56 PM EDT  Workstation ID: [id number]              Results for orders placed during the hospital encounter of 08/30/24    Adult Transthoracic Echo Complete W/ Cont if Necessary Per Protocol    Interpretation Summary    Left ventricular ejection fraction appears to be greater than 70%.    Left ventricular wall thickness is consistent with mild posterior asymmetric hypertrophy.    The right ventricular cavity is mild to moderately dilated.    The left atrial cavity is mildly dilated.    Left atrial volume is mildly increased.    The right atrial cavity is moderately  dilated.    Estimated right ventricular systolic pressure from tricuspid regurgitation is normal (<35 mmHg).      Plan for Follow-up of Pending Labs/Results:     Discharge Details        Discharge Medications        New Medications        Instructions Start Date   acetaminophen 325 MG tablet  Commonly known as: TYLENOL   650 mg, Oral, Every 4 Hours PRN      amLODIPine 5 MG tablet  Commonly known as: NORVASC   5 mg, Oral, Every 24 Hours Scheduled   Start Date: June 12, 2025     cefuroxime 250 MG tablet  Commonly known as: CEFTIN   750 mg, Oral, 3 times daily      diclofenac 50 MG EC tablet  Commonly known as: VOLTAREN   50 mg, Oral, 2 Times Daily      doxycycline 100 MG capsule  Commonly known as: MONODOX   100 mg, Oral, 2 Times Daily      hydroCHLOROthiazide 12.5 MG tablet   12.5 mg,  Oral, Daily   Start Date: June 12, 2025     predniSONE 20 MG tablet  Commonly known as: DELTASONE   40 mg, Oral, Daily With Breakfast   Start Date: June 12, 2025            Continue These Medications        Instructions Start Date   albuterol sulfate  (90 Base) MCG/ACT inhaler  Commonly known as: PROVENTIL HFA;VENTOLIN HFA;PROAIR HFA   2 puffs, Every 6 Hours PRN      Anoro Ellipta 62.5-25 MCG/ACT aerosol powder  inhaler  Generic drug: Umeclidinium-Vilanterol   1 puff, Daily - RT      bisoprolol-hydrochlorothiazide 10-6.25 MG per tablet  Commonly known as: ZIAC   1 tablet, Daily      esomeprazole 40 MG capsule  Commonly known as: nexIUM   40 mg, Every Morning Before Breakfast      gabapentin 800 MG tablet  Commonly known as: NEURONTIN   800 mg, 3 Times Daily      sertraline 50 MG tablet  Commonly known as: ZOLOFT   50 mg, Daily      traZODone 100 MG tablet  Commonly known as: DESYREL   100 mg, Nightly               Allergies   Allergen Reactions    Fish Oil Rash         Discharge Disposition:  Home or Self Care    Diet:  Hospital:  Diet Order   Procedures    Diet: Cardiac; Healthy Heart (2-3 Na+); Fluid Consistency: Thin (IDDSI 0)       Diet Instructions       Diet: Regular/House Diet, Cardiac Diets; Healthy Heart (2-3 Na+); Thin (IDDSI 0)      Discharge Diet:  Regular/House Diet  Cardiac Diets       Cardiac Diet: Healthy Heart (2-3 Na+)    Fluid Consistency: Thin (IDDSI 0)             Activity:  Activity Instructions       Activity as Tolerated              Restrictions or Other Recommendations:         CODE STATUS:    Code Status and Medical Interventions: CPR (Attempt to Resuscitate); Full Support   Ordered at: 06/07/25 0995     Code Status (Patient has no pulse and is not breathing):    CPR (Attempt to Resuscitate)     Medical Interventions (Patient has pulse or is breathing):    Full Support       No future appointments.    Additional Instructions for the Follow-ups that You Need to Schedule        Discharge Follow-up with PCP   As directed       Currently Documented PCP:    Provider, No Known    PCP Phone Number:    None     Follow Up Details: with in the week                      ELZA Gotti  06/11/25      Time Spent on Discharge:  I spent  38  minutes on this discharge activity which included: face-to-face encounter with the patient, reviewing the data in the system, coordination of the care with the nursing staff as well as consultants, documentation, and entering orders.    Electronically signed by ELZA Gotti, 06/11/25, 11:49 AM EDT.

## 2025-06-11 NOTE — PLAN OF CARE
Problem: Noninvasive Ventilation Acute  Goal: Effective Unassisted Ventilation and Oxygenation  Outcome: Progressing     Problem: Adult Inpatient Plan of Care  Goal: Plan of Care Review  Outcome: Progressing  Goal: Patient-Specific Goal (Individualized)  Outcome: Progressing  Goal: Absence of Hospital-Acquired Illness or Injury  Outcome: Progressing  Intervention: Identify and Manage Fall Risk  Recent Flowsheet Documentation  Taken 6/11/2025 0600 by Sumi Gamble RN  Safety Promotion/Fall Prevention:   activity supervised   assistive device/personal items within reach   clutter free environment maintained  Taken 6/11/2025 0400 by Sumi Gamble RN  Safety Promotion/Fall Prevention:   activity supervised   assistive device/personal items within reach   clutter free environment maintained  Taken 6/11/2025 0200 by Sumi Gamble RN  Safety Promotion/Fall Prevention:   activity supervised   assistive device/personal items within reach   clutter free environment maintained  Taken 6/11/2025 0000 by Sumi Gamble RN  Safety Promotion/Fall Prevention:   activity supervised   assistive device/personal items within reach   clutter free environment maintained  Taken 6/10/2025 2200 by Sumi Gamble RN  Safety Promotion/Fall Prevention:   activity supervised   assistive device/personal items within reach   clutter free environment maintained  Taken 6/10/2025 2000 by Sumi Gamble RN  Safety Promotion/Fall Prevention:   activity supervised   assistive device/personal items within reach   clutter free environment maintained  Intervention: Prevent Skin Injury  Recent Flowsheet Documentation  Taken 6/11/2025 0600 by Sumi Gamble RN  Body Position: position changed independently  Skin Protection: incontinence pads utilized  Taken 6/11/2025 0400 by Sumi Gamble RN  Body Position: position changed independently  Skin Protection: incontinence pads utilized  Taken 6/11/2025 0200 by Sumi Gamble RN  Body Position: position changed  independently  Skin Protection: incontinence pads utilized  Taken 6/11/2025 0000 by Sumi Gamble RN  Body Position: position changed independently  Skin Protection: incontinence pads utilized  Taken 6/10/2025 2200 by Sumi Gamble RN  Body Position: position changed independently  Skin Protection: incontinence pads utilized  Taken 6/10/2025 2000 by Sumi Gamble RN  Body Position: position changed independently  Skin Protection: incontinence pads utilized  Intervention: Prevent and Manage VTE (Venous Thromboembolism) Risk  Recent Flowsheet Documentation  Taken 6/10/2025 2000 by Sumi Gamble RN  VTE Prevention/Management: (see MAR) other (see comments)  Intervention: Prevent Infection  Recent Flowsheet Documentation  Taken 6/11/2025 0600 by Sumi Gamble RN  Infection Prevention:   hand hygiene promoted   rest/sleep promoted  Taken 6/11/2025 0400 by Sumi Gamble RN  Infection Prevention:   hand hygiene promoted   rest/sleep promoted  Taken 6/11/2025 0200 by Sumi Gamble RN  Infection Prevention:   hand hygiene promoted   rest/sleep promoted  Taken 6/11/2025 0000 by Sumi Gamble RN  Infection Prevention:   hand hygiene promoted   rest/sleep promoted  Taken 6/10/2025 2200 by Sumi Gamble RN  Infection Prevention:   hand hygiene promoted   rest/sleep promoted  Taken 6/10/2025 2000 by Sumi Gamble RN  Infection Prevention:   hand hygiene promoted   rest/sleep promoted  Goal: Optimal Comfort and Wellbeing  Outcome: Progressing  Intervention: Provide Person-Centered Care  Recent Flowsheet Documentation  Taken 6/10/2025 2000 by Sumi Gamble RN  Trust Relationship/Rapport: care explained  Goal: Readiness for Transition of Care  Outcome: Progressing     Problem: Comorbidity Management  Goal: Maintenance of Asthma Control  Outcome: Progressing  Intervention: Maintain Asthma Symptom Control  Recent Flowsheet Documentation  Taken 6/11/2025 0600 by Sumi Gamble RN  Medication Review/Management: medications  reviewed  Taken 6/11/2025 0400 by Sumi Gamble RN  Medication Review/Management: medications reviewed  Taken 6/11/2025 0200 by Sumi Gamble RN  Medication Review/Management: medications reviewed  Taken 6/11/2025 0000 by Sumi Gamble RN  Medication Review/Management: medications reviewed  Taken 6/10/2025 2200 by Sumi Gamble RN  Medication Review/Management: medications reviewed  Taken 6/10/2025 2000 by Sumi Gamble RN  Medication Review/Management: medications reviewed  Goal: Maintenance of COPD Symptom Control  Outcome: Progressing  Intervention: Maintain COPD (Chronic Obstructive Pulmonary Disease) Symptom Control  Recent Flowsheet Documentation  Taken 6/11/2025 0600 by Sumi Gamble RN  Medication Review/Management: medications reviewed  Taken 6/11/2025 0400 by Sumi Gamble RN  Medication Review/Management: medications reviewed  Taken 6/11/2025 0200 by Sumi Gamble RN  Medication Review/Management: medications reviewed  Taken 6/11/2025 0000 by Sumi Gamble RN  Medication Review/Management: medications reviewed  Taken 6/10/2025 2200 by Sumi Gamble RN  Medication Review/Management: medications reviewed  Taken 6/10/2025 2000 by Sumi Gamble RN  Medication Review/Management: medications reviewed  Goal: Blood Pressure in Desired Range  Outcome: Progressing  Intervention: Maintain Blood Pressure Management  Recent Flowsheet Documentation  Taken 6/11/2025 0600 by Sumi Gamble RN  Medication Review/Management: medications reviewed  Taken 6/11/2025 0400 by Sumi Gamble RN  Medication Review/Management: medications reviewed  Taken 6/11/2025 0200 by Sumi Gamble RN  Medication Review/Management: medications reviewed  Taken 6/11/2025 0000 by Sumi Gamble RN  Medication Review/Management: medications reviewed  Taken 6/10/2025 2200 by Sumi Gamble RN  Medication Review/Management: medications reviewed  Taken 6/10/2025 2000 by Sumi Gamble RN  Medication Review/Management: medications reviewed     Problem: Skin  Injury Risk Increased  Goal: Skin Health and Integrity  Outcome: Progressing  Intervention: Optimize Skin Protection  Recent Flowsheet Documentation  Taken 6/11/2025 0600 by Sumi Gamble RN  Activity Management: activity encouraged  Pressure Reduction Techniques: frequent weight shift encouraged  Head of Bed (HOB) Positioning: HOB elevated  Pressure Reduction Devices: pressure-redistributing mattress utilized  Skin Protection: incontinence pads utilized  Taken 6/11/2025 0400 by Sumi Gamble RN  Activity Management: activity encouraged  Pressure Reduction Techniques: frequent weight shift encouraged  Head of Bed (HOB) Positioning: HOB elevated  Pressure Reduction Devices: pressure-redistributing mattress utilized  Skin Protection: incontinence pads utilized  Taken 6/11/2025 0200 by Sumi Gamble RN  Activity Management: activity encouraged  Pressure Reduction Techniques: frequent weight shift encouraged  Head of Bed (HOB) Positioning: HOB elevated  Pressure Reduction Devices: pressure-redistributing mattress utilized  Skin Protection: incontinence pads utilized  Taken 6/11/2025 0000 by Sumi Gamble RN  Activity Management: activity encouraged  Pressure Reduction Techniques: frequent weight shift encouraged  Head of Bed (HOB) Positioning: HOB elevated  Pressure Reduction Devices: pressure-redistributing mattress utilized  Skin Protection: incontinence pads utilized  Taken 6/10/2025 2200 by Sumi Gamble, RN  Activity Management: activity encouraged  Pressure Reduction Techniques:   frequent weight shift encouraged   pressure points protected   weight shift assistance provided  Head of Bed (HOB) Positioning: HOB elevated  Pressure Reduction Devices: pressure-redistributing mattress utilized  Skin Protection: incontinence pads utilized  Taken 6/10/2025 2000 by Sumi Gamble, RN  Activity Management: activity encouraged  Pressure Reduction Techniques:   frequent weight shift encouraged   pressure points protected    weight shift assistance provided  Head of Bed (HOB) Positioning: HOB elevated  Pressure Reduction Devices: pressure-redistributing mattress utilized  Skin Protection: incontinence pads utilized     Problem: Fall Injury Risk  Goal: Absence of Fall and Fall-Related Injury  Outcome: Progressing  Intervention: Identify and Manage Contributors  Recent Flowsheet Documentation  Taken 6/11/2025 0600 by Sumi Gamble RN  Medication Review/Management: medications reviewed  Taken 6/11/2025 0400 by Sumi Gamble RN  Medication Review/Management: medications reviewed  Taken 6/11/2025 0200 by Sumi Gamble RN  Medication Review/Management: medications reviewed  Taken 6/11/2025 0000 by Sumi Gamble RN  Medication Review/Management: medications reviewed  Taken 6/10/2025 2200 by Sumi Gamble RN  Medication Review/Management: medications reviewed  Taken 6/10/2025 2000 by Sumi Gamble RN  Medication Review/Management: medications reviewed  Intervention: Promote Injury-Free Environment  Recent Flowsheet Documentation  Taken 6/11/2025 0600 by Sumi Gamble RN  Safety Promotion/Fall Prevention:   activity supervised   assistive device/personal items within reach   clutter free environment maintained  Taken 6/11/2025 0400 by Sumi Gamble RN  Safety Promotion/Fall Prevention:   activity supervised   assistive device/personal items within reach   clutter free environment maintained  Taken 6/11/2025 0200 by Sumi Gamble RN  Safety Promotion/Fall Prevention:   activity supervised   assistive device/personal items within reach   clutter free environment maintained  Taken 6/11/2025 0000 by Sumi Gamble RN  Safety Promotion/Fall Prevention:   activity supervised   assistive device/personal items within reach   clutter free environment maintained  Taken 6/10/2025 2200 by Sumi Gamble RN  Safety Promotion/Fall Prevention:   activity supervised   assistive device/personal items within reach   clutter free environment maintained  Taken  6/10/2025 2000 by Sumi Gamble RN  Safety Promotion/Fall Prevention:   activity supervised   assistive device/personal items within reach   clutter free environment maintained     Problem: COPD (Chronic Obstructive Pulmonary Disease) Exacerbation  Goal: Optimal Coping with Chronic Illness  Outcome: Progressing  Intervention: Support and Optimize Psychosocial Response  Recent Flowsheet Documentation  Taken 6/10/2025 2000 by Sumi Gamble RN  Supportive Measures: active listening utilized  Family/Support System Care: self-care encouraged  Goal: Optimal Level of Functional Divide  Outcome: Progressing  Intervention: Optimize Functional Ability  Recent Flowsheet Documentation  Taken 6/11/2025 0600 by Sumi Gamble RN  Activity Management: activity encouraged  Taken 6/11/2025 0400 by Sumi Gamble RN  Activity Management: activity encouraged  Taken 6/11/2025 0200 by Sumi Gamble RN  Activity Management: activity encouraged  Taken 6/11/2025 0000 by Sumi Gamble RN  Activity Management: activity encouraged  Taken 6/10/2025 2200 by Sumi Gamble RN  Activity Management: activity encouraged  Taken 6/10/2025 2000 by Sumi Gamble RN  Activity Management: activity encouraged  Goal: Absence of Infection Signs and Symptoms  Outcome: Progressing  Goal: Improved Oral Intake  Outcome: Progressing  Goal: Effective Oxygenation and Ventilation  Outcome: Progressing  Intervention: Promote Airway Secretion Clearance  Recent Flowsheet Documentation  Taken 6/11/2025 0600 by Sumi Gamble RN  Activity Management: activity encouraged  Taken 6/11/2025 0400 by Sumi Gamble RN  Activity Management: activity encouraged  Taken 6/11/2025 0200 by Sumi Gamble RN  Activity Management: activity encouraged  Taken 6/11/2025 0000 by Sumi Gamble RN  Activity Management: activity encouraged  Taken 6/10/2025 2200 by Sumi Gamble RN  Activity Management: activity encouraged  Cough And Deep Breathing: done independently per patient  Taken  6/10/2025 2000 by Sumi Gamble, RN  Activity Management: activity encouraged  Cough And Deep Breathing: done independently per patient  Intervention: Optimize Oxygenation and Ventilation  Recent Flowsheet Documentation  Taken 6/11/2025 0600 by Sumi Gamble RN  Head of Bed ([facility]) Positioning: HOB elevated  Taken 6/11/2025 0400 by Sumi Gamble RN  Head of Bed (HOB) Positioning: HOB elevated  Taken 6/11/2025 0200 by Sumi Gamble RN  Head of Bed (HOB) Positioning: HOB elevated  Taken 6/11/2025 0000 by Sumi Gamble RN  Head of Bed (HOB) Positioning: HOB elevated  Taken 6/10/2025 2200 by Sumi Gamble RN  Head of Bed (HOB) Positioning: HOB elevated  Taken 6/10/2025 2000 by Sumi Gamble RN  Fluid/Electrolyte Management: fluids provided  Head of Bed (HOB) Positioning: HOB elevated   Goal Outcome Evaluation:

## 2025-06-11 NOTE — PLAN OF CARE
Goal Outcome Evaluation:  Plan of Care Reviewed With: patient           Outcome Evaluation: Physical therapy mobility evaluation complete. The patient required SBA to ambulate without AD, decreased gait speed throughout. Patient educated on energy conservation strategies, verbalized understanding. Patient presents near baseline for mobility, no acute PT needs identified.    Anticipated Discharge Disposition (PT): home with assist

## 2025-06-11 NOTE — CASE MANAGEMENT/SOCIAL WORK
Case Management Discharge Note      Final Note: Per medical team, pt is medically ready for discharge today. I met with pt at bedside regarding discharge plan and plan is home with family. Pt reports family will transport and bring portable O2 tank for transportation home. Pt dnies further discharge needs.         Selected Continued Care - Admitted Since 6/7/2025       Destination    No services have been selected for the patient.                Durable Medical Equipment    No services have been selected for the patient.                Dialysis/Infusion    No services have been selected for the patient.                Home Medical Care    No services have been selected for the patient.                Therapy    No services have been selected for the patient.                Community Resources    No services have been selected for the patient.                Community & DME    No services have been selected for the patient.                         Final Discharge Disposition Code: 01 - home or self-care

## 2025-06-12 NOTE — OUTREACH NOTE
Prep Survey      Flowsheet Row Responses   [facility] facility patient discharged from? [place]   Is LACE score < 7 ? No   Eligibility Readm Mgmt   Discharge diagnosis COPD exacerbation   Does the patient have one of the following disease processes/diagnoses(primary or secondary)? COPD   Does the patient have Home health ordered? No   Is there a DME ordered? Yes   What DME was ordered? [employer] for portable O2 tank   Prep survey completed? Yes            BORIS BARFIELD - Registered Nurse

## 2025-06-13 NOTE — PAYOR COMM NOTE
"Sarah Isbell RN  [facility]  Utilization Management  P:[phone]  F:[phone]    Ref # [id number]     Bryn Keita (70 y.o. Female)       Date of Birth   1955    Social Security Number       Address   [address] [place] [id number]    Home Phone   [phone]    MRN   [id number]       Christian   [facility]    Marital Status                               Admission Date   2025    Admission Type   Emergency    Admitting Provider   Chelly Sandhu MD    Attending Provider       Department, Room/Bed   [facility] 6A, N606/1       Discharge Date   2025    Discharge Disposition   Home or Self Care    Discharge Destination                                 Attending Provider: (none)   Allergies: Fish Oil    Isolation: None   Infection: None   Code Status: Prior    Ht: 162.6 cm (64\")   Wt: 73.4 kg (161 lb 12.8 oz)    Admission Cmt: None   Principal Problem: COPD exacerbation [J44.1]                   Active Insurance as of 2025       Primary Coverage       Payor Plan Insurance Group Employer/Plan Group    ANTHEM MEDICARE REPLACEMENT ANTHEM MEDICARE ADVANTAGE PPO [id number]       Payor Plan Address Payor Plan Phone Number Payor Plan Fax Number Effective Dates    [phone]  2024 - None Entered    [facility] [address]         Subscriber Name Subscriber Birth Date Member ID       BRYN KEITA 1955 [id number]                     Emergency Contacts        (Rel.) Home Phone Work Phone Mobile Phone    AARON WYLIE (Daughter) -- -- [phone]    KEITA,JESENIA (Spouse) [phone] -- --    BRYN WYLIE (Daughter) [phone] -- [phone]                 Discharge Summary        Aaron Lau APRN at [address] [place] [address] Medicine Services  DISCHARGE SUMMARY    Patient Name: Bryn Keita  : 1955  MRN: [id number]    Date of Admission: 2025  8:48 PM  Date of Discharge: " 6/11/2025  Primary Care Physician: Provider, No Known    Consults       No orders found from 5/9/2025 to 6/8/2025.            Hospital Course     Presenting Problem: SOA    Active Hospital Problems    Diagnosis  POA    Peripheral polyneuropathy [G62.9]  Unknown    Lower extremity venous stasis [I87.8]  Unknown    Mood disorder [F39]  Unknown    Essential hypertension [I10]  Yes    Acute on chronic respiratory failure with hypoxia and hypercapnia [J96.21, J96.22]  Yes    Nicotine dependence [F17.200]  Yes      Resolved Hospital Problems    Diagnosis Date Resolved POA    **COPD exacerbation [J44.1] 06/11/2025 Yes          Hospital Course:  Elvi Keita is a 70 y.o. female with past medical history of chronic respiratory failure, COPD, hypertension, neuropathy, venous stasis and mood disorder who presented with worsening respiratory failure and acute exacerbation of her COPD.    COPD exacerbation  A/C respiratory failure w/ hypoxia / hypercapnia  Pleurisy  - Wean O2 as able-patient baseline O2 is 3 L, currently at 4L  - yupelri / brovana /pulmicort nebs scheduled-resume home respiratory regimen at discharge  - Continue prednisone burst at discharge, 5-day total  - unasyn / doxy changed to oral Ceftin/doxycycline, day 4/5-complete at home upon discharge  - pulmonary toilet / flutter valve / IS  - hold azithromycin, taking daily prophylactic dose-can resume home dosing after completion of Ceftin/doxycycline  - Diclofenac every 12 hours for the next few days for pleuritic chest pain.  Continue PPI with     Elevated BNP / Elevated troponin / HTN  - denies chest pain  - troponin flat   - EKG normal sinus rhythm  - continue bisoprolol /hctz  -HCTZ increased and amlodipine added for discharge  - ECHO 8/2024 w/ EF 70%     BLE venous stasis  - WOC evaluation  - AmLactin inpatient  - has upcoming evaluation per daughter by a vein specialist     Nicotine dependence  - nicotine patch provided  - smoking cessation  education      Discharge Follow Up Recommendations for outpatient labs/diagnostics:  PCP follow-up 1 week, BP monitoring    Day of Discharge     HPI:   Still with some right sided pleuritic chest discomfort intermittently.  Medication helps.  Denies shortness of breath.  Cough improved     Review of Systems  Gen- No fevers, chills  CV- No chest pain, palpitations  Resp- No cough, dyspnea  GI- No N/V/D, abd pain      Vital Signs:   Temp:  [97.6 °F (36.4 °C)-98.8 °F (37.1 °C)] 97.6 °F (36.4 °C)  Heart Rate:  [56-72] 70  Resp:  [17-18] 18  BP: (161-176)/(63-74) 176/68  Flow (L/min) (Oxygen Therapy):  [4] 4      Physical Exam:  Constitutional: No acute distress, awake, alert  HENT: NCAT, mucous membranes moist  Respiratory: Clear to auscultation bilaterally, respiratory effort normal   Cardiovascular: RRR, no murmurs, rubs, or gallops  Gastrointestinal: Positive bowel sounds, soft, nontender, nondistended  Musculoskeletal: No bilateral ankle edema  Psychiatric: Appropriate affect, cooperative  Neurologic: Oriented x 3, strength symmetric in all extremities, Cranial Nerves grossly intact to confrontation, speech clear  Skin: No rashes      Pertinent  and/or Most Recent Results     LAB RESULTS:      Lab 06/08/25  0343 06/07/25  2105   WBC 6.72 8.44   HEMOGLOBIN 15.9 17.4*   HEMATOCRIT 50.1* 54.4*   PLATELETS 146 153   NEUTROS ABS 6.24 6.44   IMMATURE GRANS (ABS) 0.02 0.03   LYMPHS ABS 0.42* 1.34   MONOS ABS 0.03* 0.53   EOS ABS 0.00 0.07   .0* 100.0*   PROCALCITONIN  --  0.07   LDH  --  213   D DIMER QUANT  --  0.60         Lab 06/10/25  0335 06/08/25  0343 06/07/25  2105   SODIUM 135* 135* 137   POTASSIUM 4.4 4.2 4.6   CHLORIDE 94* 94* 94*   CO2 36.0* 32.0* 33.0*   ANION GAP 5.0 9.0 10.0   BUN 15.1 10.6 9.0   CREATININE 0.65 0.76 0.82   EGFR 94.9 84.4 77.1   GLUCOSE 88 155* 133*   CALCIUM 8.7 8.7 9.4   MAGNESIUM  --  1.5*  --          Lab 06/07/25  2105   TOTAL PROTEIN 6.6   ALBUMIN 3.9   GLOBULIN 2.7   ALT (SGPT)  12   AST (SGOT) 14   BILIRUBIN 0.4   ALK PHOS 100         Lab 06/08/25  0343 06/07/25  2225 06/07/25 2105   PROBNP  --   --  1,097.0*   HSTROP T 27* 31* 28*                 Lab 06/07/25 2127   PH, ARTERIAL 7.382   PCO2, ARTERIAL 60.4*   PO2 .0*   FIO2 50   HCO3 ART 35.9*   BASE EXCESS ART 7.9*   CARBOXYHEMOGLOBIN 5.5*     Brief Urine Lab Results  (Last result in the past 365 days)        Color   Clarity   Blood   Leuk Est   Nitrite   Protein   CREAT   Urine HCG        10/14/24 1518 Yellow   Clear   Negative   Negative   Negative   Negative                 Microbiology Results (last 10 days)       Procedure Component Value - Date/Time    COVID PRE-OP / PRE-PROCEDURE SCREENING ORDER (NO ISOLATION) - Swab, Nasopharynx [[id number]]  (Normal) Collected: 06/07/25 2107    Lab Status: Final result Specimen: Swab from Nasopharynx Updated: 06/07/25 2137    Narrative:      The following orders were created for panel order COVID PRE-OP / PRE-PROCEDURE SCREENING ORDER (NO ISOLATION) - Swab, Nasopharynx.  Procedure                               Abnormality         Status                     ---------                               -----------         ------                     COVID-19 and FLU A/B PCR...[[id number]]  Normal              Final result                 Please view results for these tests on the individual orders.    COVID-19 and FLU A/B PCR, 1 HR TAT - Swab, Nasopharynx [[id number]]  (Normal) Collected: 06/07/25 2107    Lab Status: Final result Specimen: Swab from Nasopharynx Updated: 06/07/25 2137     COVID19 Not Detected     Influenza A PCR Not Detected     Influenza B PCR Not Detected    Narrative:      Fact sheet for providers: https://www.fda.gov/media/223299/download    Fact sheet for patients: https://www.fda.gov/media/802242/download    Test performed by PCR.            XR Chest 1 View  Result Date: 6/7/2025  XR CHEST 1 VW Date of Exam: 6/7/2025 9:03 PM EDT Indication: sob Comparison: 5/21/2025.  Findings: Stable chronic interstitial disease and emphysema. No new lung opacity. No pneumothorax or pleural effusion. Heart size and pulmonary vasculature appear within normal limits. No acute osseous abnormalities.     Impression: Chronic interstitial disease and emphysema without superimposed acute process of the chest. Electronically Signed: Claude Jon MD  6/7/2025 9:56 PM EDT  Workstation ID: [id number]              Results for orders placed during the hospital encounter of 08/30/24    Adult Transthoracic Echo Complete W/ Cont if Necessary Per Protocol    Interpretation Summary    Left ventricular ejection fraction appears to be greater than 70%.    Left ventricular wall thickness is consistent with mild posterior asymmetric hypertrophy.    The right ventricular cavity is mild to moderately dilated.    The left atrial cavity is mildly dilated.    Left atrial volume is mildly increased.    The right atrial cavity is moderately  dilated.    Estimated right ventricular systolic pressure from tricuspid regurgitation is normal (<35 mmHg).      Plan for Follow-up of Pending Labs/Results:     Discharge Details        Discharge Medications        New Medications        Instructions Start Date   acetaminophen 325 MG tablet  Commonly known as: TYLENOL   650 mg, Oral, Every 4 Hours PRN      amLODIPine 5 MG tablet  Commonly known as: NORVASC   5 mg, Oral, Every 24 Hours Scheduled   Start Date: June 12, 2025     cefuroxime 250 MG tablet  Commonly known as: CEFTIN   750 mg, Oral, 3 times daily      diclofenac 50 MG EC tablet  Commonly known as: VOLTAREN   50 mg, Oral, 2 Times Daily      doxycycline 100 MG capsule  Commonly known as: MONODOX   100 mg, Oral, 2 Times Daily      hydroCHLOROthiazide 12.5 MG tablet   12.5 mg, Oral, Daily   Start Date: June 12, 2025     predniSONE 20 MG tablet  Commonly known as: DELTASONE   40 mg, Oral, Daily With Breakfast   Start Date: June 12, 2025            Continue These Medications         Instructions Start Date   albuterol sulfate  (90 Base) MCG/ACT inhaler  Commonly known as: PROVENTIL HFA;VENTOLIN HFA;PROAIR HFA   2 puffs, Every 6 Hours PRN      Anoro Ellipta 62.5-25 MCG/ACT aerosol powder  inhaler  Generic drug: Umeclidinium-Vilanterol   1 puff, Daily - RT      bisoprolol-hydrochlorothiazide 10-6.25 MG per tablet  Commonly known as: ZIAC   1 tablet, Daily      esomeprazole 40 MG capsule  Commonly known as: nexIUM   40 mg, Every Morning Before Breakfast      gabapentin 800 MG tablet  Commonly known as: NEURONTIN   800 mg, 3 Times Daily      sertraline 50 MG tablet  Commonly known as: ZOLOFT   50 mg, Daily      traZODone 100 MG tablet  Commonly known as: DESYREL   100 mg, Nightly               Allergies   Allergen Reactions    Fish Oil Rash         Discharge Disposition:  Home or Self Care    Diet:  Hospital:  Diet Order   Procedures    Diet: Cardiac; Healthy Heart (2-3 Na+); Fluid Consistency: Thin (IDDSI 0)       Diet Instructions       Diet: Regular/House Diet, Cardiac Diets; Healthy Heart (2-3 Na+); Thin (IDDSI 0)      Discharge Diet:  Regular/House Diet  Cardiac Diets       Cardiac Diet: Healthy Heart (2-3 Na+)    Fluid Consistency: Thin (IDDSI 0)             Activity:  Activity Instructions       Activity as Tolerated              Restrictions or Other Recommendations:         CODE STATUS:    Code Status and Medical Interventions: CPR (Attempt to Resuscitate); Full Support   Ordered at: 06/07/25 4451     Code Status (Patient has no pulse and is not breathing):    CPR (Attempt to Resuscitate)     Medical Interventions (Patient has pulse or is breathing):    Full Support       No future appointments.    Additional Instructions for the Follow-ups that You Need to Schedule       Discharge Follow-up with PCP   As directed       Currently Documented PCP:    Provider, No Known    PCP Phone Number:    None     Follow Up Details: with in the week                      Margret Lau  ELZA  06/11/25      Time Spent on Discharge:  I spent  38  minutes on this discharge activity which included: face-to-face encounter with the patient, reviewing the data in the system, coordination of the care with the nursing staff as well as consultants, documentation, and entering orders.    Electronically signed by ELZA Gotti, 06/11/25, 11:49 AM EDT.          Electronically signed by Margret Lau APRN at 06/11/25 6817

## 2025-06-17 ENCOUNTER — READMISSION MANAGEMENT (OUTPATIENT)
Dept: CALL CENTER | Facility: HOSPITAL | Age: 70
End: 2025-06-17
Payer: MEDICARE

## 2025-06-17 NOTE — OUTREACH NOTE
COPD/PN Week 1 Survey      Flowsheet Row Responses   [facility] patient discharged from? [place]   Does the patient have one of the following disease processes/diagnoses(primary or secondary)? COPD   Week 1 attempt successful? Yes   Call start time 1116   Call end time 1120   Discharge diagnosis COPD exacerbation   Meds reviewed with patient/caregiver? Yes   Is the patient having any side effects they believe may be caused by any medication additions or changes? No   Does the patient have all medications ordered at discharge? Yes   Is the patient taking all medications as directed (includes completed medication regime)? Yes   Does the patient have a primary care provider?  Yes   Does the patient have an appointment with their PCP or specialist within 7 days of discharge? Yes   Comments regarding PCP Patient reports all follow up appts are in place.   Has the patient kept scheduled appointments due by today? N/A   Has home health visited the patient within 72 hours of discharge? N/A   Pulse Ox monitoring Intermittent   Pulse Ox device source Patient   O2 Sat comments O2 sats 86-94% on 3L   O2 Sat: education provided Sat levels, Monitoring frequency, When to seek care   Psychosocial issues? No   Did the patient receive a copy of their discharge instructions? Yes   Nursing interventions Reviewed instructions with patient   What is the patient's perception of their health status since discharge? Improving   Nursing Interventions Nurse provided patient education   Are the patient's immunizations up to date?  Yes   Nursing interventions Educated on importance of maintaining up to date immunizations as advised by provider   If the patient is a current smoker, are they able to teach back resources for cessation? Smoking cessation medications   Is the patient/caregiver able to teach back the hierarchy of who to call/visit for symptoms/problems? PCP, Specialist, Home health nurse, Urgent Care, ED, 911 Yes   Is the  patient able to teach back COPD zones? Yes   Nursing interventions Education provided on various zones   Patient reports what zone on this call? Yellow Zone   Yellow Zone I have less energy for my daily activities, More breathless than usual, Poor sleep and my symptoms woke me up   Yellow interventions Continue taking daily medications, Use oxygen as prescribed, Get plenty of rest, Call provider immediately if symptoms don't improve: they may indicate that an adjustment in medication or oxygen therapy is needed, Avoid second-hand smoke, e-cigarette aerosol, and other inhaled irritants   Week 1 call completed? Yes   Is the patient interested in additional calls from an ambulatory ? No   Would this patient benefit from a Referral to [facility] Social Work? No   Call end time 1120            Melissa MELISSA - Registered Nurse

## 2025-06-26 ENCOUNTER — READMISSION MANAGEMENT (OUTPATIENT)
Dept: CALL CENTER | Facility: HOSPITAL | Age: 70
End: 2025-06-26
Payer: MEDICARE

## 2025-06-26 NOTE — OUTREACH NOTE
COPD/PN Week 2 Survey      Flowsheet Row Responses   [facility] patient discharged from? [place]   Does the patient have one of the following disease processes/diagnoses(primary or secondary)? COPD   Week 2 attempt successful? Yes   Call start time 1617   Call end time 1624   Discharge diagnosis COPD exacerbation   Meds reviewed with patient/caregiver? Yes   Is the patient taking all medications as directed (includes completed medication regime)? Yes   Does the patient have a primary care provider?  Yes   Does the patient have an appointment with their PCP or specialist within 7 days of discharge? No   What is preventing the patient from scheduling follow up appointments within 7 days of discharge? Haven't had time   Nursing Interventions Advised patient to make appointment, Educated patient on importance of making appointment   Has the patient kept scheduled appointments due by today? N/A   Comments PCP appt in July   Has home health visited the patient within 72 hours of discharge? N/A   Psychosocial issues? No   Did the patient receive a copy of their discharge instructions? Yes   Nursing interventions Reviewed instructions with patient   What is the patient's perception of their health status since discharge? Improving   Nursing Interventions Nurse provided patient education   Week 2 call completed? Yes   Graduated Yes   Graduated/Revoked comments pt reports her breathing is better but she think she pulled a muscle in her low back from lifting on her . per pt, she has New PCP appt 7/7/25   Call end time 1624            SHELBY PRINGLE - Registered Nurse

## 2025-06-28 LAB
QT INTERVAL: 406 MS
QTC INTERVAL: 444 MS